# Patient Record
Sex: FEMALE | Race: WHITE | Employment: OTHER | URBAN - METROPOLITAN AREA
[De-identification: names, ages, dates, MRNs, and addresses within clinical notes are randomized per-mention and may not be internally consistent; named-entity substitution may affect disease eponyms.]

---

## 2022-03-21 NOTE — PROGRESS NOTES
New Patient, Referred by Dr. Luis Alfredo Wong for endometrial cancer, endometrial biopsy was done on 3/10/22, pt reports no vaginal bleeding today    1. Have you been to the ER, urgent care clinic since your last visit? Hospitalized since your last visit?  no    2. Have you seen or consulted any other health care providers outside of the [address] [place] [address] since your last visit? Include any pap smears or colon screening.    Yes, Dr. Luis Alfredo Wong

## 2022-03-22 ENCOUNTER — OFFICE VISIT (OUTPATIENT)
Dept: GYNECOLOGY | Age: 67
End: 2022-03-22
Payer: MEDICARE

## 2022-03-22 VITALS
HEART RATE: 92 BPM | DIASTOLIC BLOOD PRESSURE: 89 MMHG | BODY MASS INDEX: 38.09 KG/M2 | HEIGHT: 63 IN | SYSTOLIC BLOOD PRESSURE: 146 MMHG | WEIGHT: 215 LBS

## 2022-03-22 DIAGNOSIS — C54.1 CARCINOSARCOMA OF ENDOMETRIUM (HCC): Primary | ICD-10-CM

## 2022-03-22 PROBLEM — C55 MALIGNANT NEOPLASM OF UTERUS (HCC): Status: ACTIVE | Noted: 2022-03-10

## 2022-03-22 PROCEDURE — 3017F COLORECTAL CA SCREEN DOC REV: CPT | Performed by: OBSTETRICS & GYNECOLOGY

## 2022-03-22 PROCEDURE — G8432 DEP SCR NOT DOC, RNG: HCPCS | Performed by: OBSTETRICS & GYNECOLOGY

## 2022-03-22 PROCEDURE — G8400 PT W/DXA NO RESULTS DOC: HCPCS | Performed by: OBSTETRICS & GYNECOLOGY

## 2022-03-22 PROCEDURE — 1101F PT FALLS ASSESS-DOCD LE1/YR: CPT | Performed by: OBSTETRICS & GYNECOLOGY

## 2022-03-22 PROCEDURE — 99205 OFFICE O/P NEW HI 60 MIN: CPT | Performed by: OBSTETRICS & GYNECOLOGY

## 2022-03-22 PROCEDURE — 1090F PRES/ABSN URINE INCON ASSESS: CPT | Performed by: OBSTETRICS & GYNECOLOGY

## 2022-03-22 PROCEDURE — G0463 HOSPITAL OUTPT CLINIC VISIT: HCPCS | Performed by: OBSTETRICS & GYNECOLOGY

## 2022-03-22 PROCEDURE — G8427 DOCREV CUR MEDS BY ELIG CLIN: HCPCS | Performed by: OBSTETRICS & GYNECOLOGY

## 2022-03-22 PROCEDURE — G8419 CALC BMI OUT NRM PARAM NOF/U: HCPCS | Performed by: OBSTETRICS & GYNECOLOGY

## 2022-03-22 PROCEDURE — G8536 NO DOC ELDER MAL SCRN: HCPCS | Performed by: OBSTETRICS & GYNECOLOGY

## 2022-03-22 RX ORDER — CHOLECALCIFEROL (VITAMIN D3) 50 MCG
CAPSULE ORAL
COMMUNITY

## 2022-03-22 NOTE — PROGRESS NOTES
[address] [place] [address] Mathias Moritz [address], [address] [facility]  P [phone]  F [phone]    Office Note  Patient ID:  Name:  Yee Jonas  MRN:  [id number]  :  1955/66 y.o. Date:  3/22/2022      HISTORY OF PRESENT ILLNESS:  Ms. Yee Jonas is a 74 y.o.  postmenopausal female who presents as a new patient from Dr. Jose Antonio Renteria for carcinosarcoma of the endometrium. The patient reported vaginal spotting/bleeding in February (). She underwent a pelvic ultrasound and endometrial biopsy with Dr. Jose Antonio Renteria, which ultimately demonstrated carcinosarcoma. The patient denies pelvic or abdominal pain/bloating. Denies CP or SOB. Denies hematuria or hematochezia. She is without any other complaints. 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 Imaging Review:   Pelvic ultrasound 3/14/2022:  Impression: Multi-fibroid uterus. Possible fibroid versus polyp in uterine cavity. Ovaries are not visualized. ROS:  A comprehensive review of systems was negative except for that written in the History of Present Illness.  , 10 point ROS      ECOG ndGndrndanddndend:nd nd2nd Problem List:  Patient Active Problem List    Diagnosis Date Noted    Carcinosarcoma of endometrium ([address] 2022    PMB (postmenopausal bleeding)     Obesity     Malignant neoplasm of uterus ([address] 03/10/2022     PMH:  Past Medical History:   Diagnosis Date    Enlarged uterus     Malignant neoplasm of uterus ([address] 03/10/2022    endometrial biopsy done on 3/10/22 by Dr. Leanne Fields PMB (postmenopausal bleeding)       PSH:  Past Surgical History:   Procedure Laterality Date    HX ORTHOPAEDIC      knee replacement X2    HX OTHER SURGICAL  2016    repair of retina    HX TONSILLECTOMY      HX [facility] History:  Social History     Tobacco Use    Smoking status: Never Smoker    Smokeless tobacco: Never Used   Substance Use Topics    Alcohol use: Yes     Alcohol/week: 7.0 standard drinks     Types: 7 Glasses of wine per week      Family History:  Family History   Problem Relation Age of Onset    Cancer Father         malignant tumor of urinary bladder      Medications: (reviewed)  Current Outpatient Medications   Medication Sig    cholecalciferol, vitamin D3, (VITAMIN D3 PO) Take  by mouth.  ubidecarenone (COQ-10 PO) Take  by mouth.  KRILL OIL PO Take  by mouth.  MULTIVITAMIN PO Take  by mouth.  B.animalis,bifid,infantis,long (Probiotic 4X) 10-15 mg TbEC Take  by mouth. No current facility-administered medications for this visit. Allergies: (reviewed)  Allergies   Allergen Reactions    Pcn [Penicillins] Unable to Obtain       OBJECTIVE:    Physical Exam:  VITAL SIGNS: Vitals:    03/22/22 1408   BP: (!) 146/89   Pulse: 92   Weight: 215 lb (97.5 kg)   Height: 5' 3\" (1.6 m)     Body mass index is 38.09 kg/m². GENERAL MARLENE: Conversant, alert, oriented. No acute distress. HEENT: HEENT. No thyroid enlargement. No JVD. Neck: Supple without restrictions. RESPIRATORY: Clear to auscultation and percussion to the bases. No CVAT. CARDIOVASC: RRR without murmur/rub. GASTROINT: soft, non-tender, without masses or organomegaly   MUSCULOSKEL: no joint tenderness, deformity or swelling       EXTREMITIES: extremities normal, atraumatic, no cyanosis or edema   PELVIC: Exam chaperoned by nurse. Normal appearing external genitalia. On speculum exam, normal appearing vagina and cervix. On bimanual exam, the cervix and uterus are mobile; Uterus is 10 weeks size. No evidence of adnexal masses or nodularity. RECTAL: deferred   DOLORES SURVEY: No suspicious lymphadenopathy or edema noted. NEURO: Grossly intact. No acute deficit.        Lab Date as available:    No results found for: WBC, HGB, HCT, PLT, MCV, HGBEXT, HCTEXT, PLTEXT, HGBEXT, HCTEXT, PLTEXT  No results found for: NA, K, CL, CO2, AGAP, GLU, BUN, CREA, BUCR, GFRAA, GFRNA, CA      IMPRESSION/PLAN:    Ms. Yecenia Pierce is a 74 y.o. 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 Problems:     Patient Active Problem List    Diagnosis Date Noted    Carcinosarcoma of endometrium ([facility] 03/22/2022    PMB (postmenopausal bleeding)     Obesity     Malignant neoplasm of uterus ([facility] 03/10/2022       I reviewed Ms. Soco Teixeira course to date, including her medical records, recent studies, physical exam, and review of symptoms. Counseled patient regarding standard of care recommendations for endometrial cancer including surgical staging. Given high-grade carcinosarcoma, will obtain CT C/A/P preoperatively. Orders placed today. Will plan for virtual visit to discuss results prior to surgery. Plan for robotic-assisted TLH/BSO/SLND, possible pelvic and/or para-aortic LND, possible exploratory laparotomy. Will post for surgery on 4/7/2022. Plan for CBCD, CMP, HbA1c, EKG, and CXR prior to surgery. Counseled patient regarding risks, benefits, indications, and alternatives to surgery. Plan to sign consents day of surgery. All questions and concerns were addressed with the patient and she is comfortable with the plan.     Defined Sensitive Document    >50% of total time allocated to visit dedicated to counseling, 60 minutes total.    Signed By: Juliette Felipe MD     3/22/2022/2:07 PM

## 2022-03-22 NOTE — LETTER
3/22/2022    Patient: Kenya Ibrahim   YOB: 1955   Date of Visit: 3/22/2022     Lenka Parnell MD  [address]  [address] [address]  Via Fax: [phone]    Dear Lenka Parnell MD,      Thank you for referring Ms. Dayanara Morse Christelnctaliaterra to [facility] for evaluation. My notes for this consultation are attached. If you have questions, please do not hesitate to call me. I look forward to following your patient along with you.       Sincerely,    Gurwinder Selby MD

## 2022-03-23 ENCOUNTER — HOSPITAL ENCOUNTER (OUTPATIENT)
Dept: CT IMAGING | Age: 67
Discharge: HOME | End: 2022-03-23
Attending: OBSTETRICS & GYNECOLOGY
Payer: MEDICARE

## 2022-03-23 DIAGNOSIS — C54.1 CARCINOSARCOMA OF ENDOMETRIUM (HCC): ICD-10-CM

## 2022-03-23 LAB — CREAT BLD-MCNC: 0.6 MG/DL (ref 0.6–1.3)

## 2022-03-23 PROCEDURE — 82565 ASSAY OF CREATININE: CPT

## 2022-03-23 PROCEDURE — 74011000250 HC RX REV CODE- 250: Performed by: OBSTETRICS & GYNECOLOGY

## 2022-03-23 PROCEDURE — 71260 CT THORAX DX C+: CPT

## 2022-03-23 PROCEDURE — 74011000636 HC RX REV CODE- 636: Performed by: OBSTETRICS & GYNECOLOGY

## 2022-03-23 PROCEDURE — 74177 CT ABD & PELVIS W/CONTRAST: CPT

## 2022-03-23 RX ORDER — BARIUM SULFATE 20 MG/ML
900 SUSPENSION ORAL
Status: COMPLETED | OUTPATIENT
Start: 2022-03-23 | End: 2022-03-23

## 2022-03-23 RX ADMIN — BARIUM SULFATE 900 ML: 21 SUSPENSION ORAL at 11:10

## 2022-03-23 RX ADMIN — IOPAMIDOL 100 ML: 755 INJECTION, SOLUTION INTRAVENOUS at 11:10

## 2022-03-24 PROBLEM — C55 MALIGNANT NEOPLASM OF UTERUS (HCC): Status: ACTIVE | Noted: 2022-03-10

## 2022-03-24 PROBLEM — C54.1 CARCINOSARCOMA OF ENDOMETRIUM (HCC): Status: ACTIVE | Noted: 2022-03-22

## 2022-03-28 NOTE — PROGRESS NOTES
Virtual visit to discuss CT scan results from 3/23/2022    Vitals:    03/29/22 1433 03/29/22 1450   BP: (!) 170/100 124/86   BP 1 Location: Left arm Left arm   BP Patient Position: Sitting Sitting   Pulse: 91 87   Height: 5' 3\" (1.6 m)    Weight: 213 lb 3.2 oz (96.7 kg)          1. Have you been to the ER, urgent care clinic since your last visit? Hospitalized since your last visit?  no    2. Have you seen or consulted any other health care providers outside of the [address] [place] [address] since your last visit? Include any pap smears or colon screening.    no

## 2022-03-29 ENCOUNTER — OFFICE VISIT (OUTPATIENT)
Dept: GYNECOLOGY | Age: 67
End: 2022-03-29
Payer: MEDICARE

## 2022-03-29 VITALS
SYSTOLIC BLOOD PRESSURE: 124 MMHG | WEIGHT: 213.2 LBS | HEIGHT: 63 IN | DIASTOLIC BLOOD PRESSURE: 86 MMHG | HEART RATE: 87 BPM | BODY MASS INDEX: 37.78 KG/M2

## 2022-03-29 DIAGNOSIS — C54.1 CARCINOSARCOMA OF ENDOMETRIUM (HCC): Primary | ICD-10-CM

## 2022-03-29 PROCEDURE — 3017F COLORECTAL CA SCREEN DOC REV: CPT | Performed by: OBSTETRICS & GYNECOLOGY

## 2022-03-29 PROCEDURE — 1101F PT FALLS ASSESS-DOCD LE1/YR: CPT | Performed by: OBSTETRICS & GYNECOLOGY

## 2022-03-29 PROCEDURE — G8417 CALC BMI ABV UP PARAM F/U: HCPCS | Performed by: OBSTETRICS & GYNECOLOGY

## 2022-03-29 PROCEDURE — G8427 DOCREV CUR MEDS BY ELIG CLIN: HCPCS | Performed by: OBSTETRICS & GYNECOLOGY

## 2022-03-29 PROCEDURE — G8400 PT W/DXA NO RESULTS DOC: HCPCS | Performed by: OBSTETRICS & GYNECOLOGY

## 2022-03-29 PROCEDURE — G8432 DEP SCR NOT DOC, RNG: HCPCS | Performed by: OBSTETRICS & GYNECOLOGY

## 2022-03-29 PROCEDURE — G8536 NO DOC ELDER MAL SCRN: HCPCS | Performed by: OBSTETRICS & GYNECOLOGY

## 2022-03-29 PROCEDURE — 99214 OFFICE O/P EST MOD 30 MIN: CPT | Performed by: OBSTETRICS & GYNECOLOGY

## 2022-03-29 PROCEDURE — 1090F PRES/ABSN URINE INCON ASSESS: CPT | Performed by: OBSTETRICS & GYNECOLOGY

## 2022-03-29 PROCEDURE — G0463 HOSPITAL OUTPT CLINIC VISIT: HCPCS | Performed by: OBSTETRICS & GYNECOLOGY

## 2022-03-29 NOTE — PROGRESS NOTES
[address] [place] [address] [address]ias [address], [address] [facility]  P [phone]  F [phone]    Office Note  Patient ID:  Name:  Jose Padilla  MRN:  [id number]  :  1955/66 y.o. Date:  3/30/2022      HISTORY OF PRESENT ILLNESS:  Ms. Jose Padilla is a 74 y.o.  postmenopausal female who presents as a new patient from Dr. Hollis Guzman for carcinosarcoma of the endometrium. The patient reported vaginal spotting/bleeding in February (). She underwent a pelvic ultrasound and endometrial biopsy with Dr. Hollis Guzman, which ultimately demonstrated carcinosarcoma. The patient denies pelvic or abdominal pain/bloating. Denies CP or SOB. Denies hematuria or hematochezia. She is without any other complaints. Interval History:  Presents today for imaging review. 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 Imaging Review:   CT C/A/P 3/23/2022:  FINDINGS:   THYROID: No nodule. MEDIASTINUM: No mass or lymphadenopathy. KARL: No mass or lymphadenopathy. THORACIC AORTA: No dissection or aneurysm. MAIN PULMONARY ARTERY: Normal in caliber. TRACHEA/BRONCHI: Patent. ESOPHAGUS: No wall thickening or dilatation. HEART: Normal in size. PLEURA: No effusion or pneumothorax. LUNGS: No nodule, mass, or airspace disease. Incidentally noted is a 2 mm  subpleural nodule right upper lobe image 46 series 4  LIVER: No mass or biliary dilatation. There is hepatic steatosis  GALLBLADDER: There is a gallstone  SPLEEN: No mass. PANCREAS: No mass or ductal dilatation. ADRENALS: There is a 1 cm left adrenal nodule  KIDNEYS: No mass, calculus, or hydronephrosis. STOMACH: There is thickening of the body of the stomach most likely related to  incomplete distention  SMALL BOWEL: No dilatation or wall thickening.   COLON: No dilatation or wall thickening. APPENDIX: Unremarkable. PERITONEUM: No ascites or pneumoperitoneum. RETROPERITONEUM: No lymphadenopathy or aortic aneurysm. REPRODUCTIVE ORGANS: The endometrium is thickened measuring up to 5.3 cm. There  is suspicion of a 3.2 cm enhancing lesion in the anterior uterine myometrium and  a 2.8 cm enhancing lesion right uterine myometrium  URINARY BLADDER: Incompletely distended  BONES: No destructive bone lesion. There are small low densities at T1/T2  bilaterally consistent with small lateral meningocele. There is slight scoliosis  levoconvex lumbar spine with 3 mm of anterolisthesis of L4 on L5 and  degenerative changes mid to lower lumbar spine  ADDITIONAL COMMENTS: N/A  IMPRESSION  1. CT of the chest demonstrates no definite evidence of metastatic disease. 2. CT of the abdomen and pelvis demonstrates the endometrium to be thickened  measuring 5.3 cm. There is suspicion of 2 enhancing lesions in the uterine  myometrium. No adenopathy or mass is identified to suggest metastatic disease. There is a 1 cm nodule in the left adrenal gland which may represent a small  adenoma    Pelvic ultrasound 3/14/2022:  Impression: Multi-fibroid uterus. Possible fibroid versus polyp in uterine cavity. Ovaries are not visualized. ROS:  A comprehensive review of systems was negative except for that written in the History of Present Illness.  , 10 point ROS      ECOG ndGndrndanddndend:nd nd2nd Problem List:  Patient Active Problem List    Diagnosis Date Noted    Carcinosarcoma of endometrium ([address] 03/22/2022    PMB (postmenopausal bleeding)     Obesity     Malignant neoplasm of uterus ([address] 03/10/2022     PMH:  Past Medical History:   Diagnosis Date    Enlarged uterus     Malignant neoplasm of uterus ([address] 03/10/2022    endometrial biopsy done on 3/10/22 by Dr. Nicole Chauhan PMB (postmenopausal bleeding)       PSH:  Past Surgical History:   Procedure Laterality Date    HX ORTHOPAEDIC      knee replacement X2    HX OTHER SURGICAL  2016    repair of retina    HX TONSILLECTOMY      HX TUBAL LIGATION  1981      Social History:  Social History     Tobacco Use    Smoking status: Never Smoker    Smokeless tobacco: Never Used   Substance Use Topics    Alcohol use: Yes     Alcohol/week: 7.0 standard drinks     Types: 7 Glasses of wine per week      Family History:  Family History   Problem Relation Age of Onset    Cancer Father         malignant tumor of urinary bladder      Medications: (reviewed)  Current Outpatient Medications   Medication Sig    cholecalciferol, vitamin D3, (VITAMIN D3 PO) Take  by mouth.  ubidecarenone (COQ-10 PO) Take  by mouth.  KRILL OIL PO Take  by mouth.  MULTIVITAMIN PO Take  by mouth.  B.animalis,bifid,infantis,long (Probiotic 4X) 10-15 mg TbEC Take  by mouth. No current facility-administered medications for this visit. Allergies: (reviewed)  Allergies   Allergen Reactions    Pcn [Penicillins] Unable to Obtain       OBJECTIVE:    Physical Exam:  VITAL SIGNS: Vitals:    03/29/22 1433 03/29/22 1450   BP: (!) 170/100 124/86   Pulse: 91 87   Weight: 213 lb 3.2 oz (96.7 kg)    Height: 5' 3\" (1.6 m)      Body mass index is 37.77 kg/m². GENERAL MARLENE: Conversant, alert, oriented. No acute distress. HEENT: HEENT. No thyroid enlargement. No JVD. Neck: Supple without restrictions. RESPIRATORY: Clear to auscultation and percussion to the bases. No CVAT. CARDIOVASC: RRR without murmur/rub. GASTROINT: soft, non-tender, without masses or organomegaly   MUSCULOSKEL: no joint tenderness, deformity or swelling       EXTREMITIES: extremities normal, atraumatic, no cyanosis or edema   PELVIC: Exam chaperoned by nurse. Normal appearing external genitalia. On speculum exam, normal appearing vagina and cervix. On bimanual exam, the cervix and uterus are mobile; Uterus is 10 weeks size. No evidence of adnexal masses or nodularity.     RECTAL: deferred   DOLORES SURVEY: No suspicious lymphadenopathy or edema noted. NEURO: Grossly intact. No acute deficit. Lab Date as available:    No results found for: WBC, HGB, HCT, PLT, MCV, HGBEXT, HCTEXT, PLTEXT, HGBEXT, HCTEXT, PLTEXT  No results found for: NA, K, CL, CO2, AGAP, GLU, BUN, CREA, BUCR, GFRAA, GFRNA, CA      IMPRESSION/PLAN:    Ms. Veto Lima is a 74 y.o. 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 Problems:     Patient Active Problem List    Diagnosis Date Noted    Carcinosarcoma of endometrium ([facility] 03/22/2022    PMB (postmenopausal bleeding)     Obesity     Malignant neoplasm of uterus ([facility] 03/10/2022     Reviewed patient's course to date, including her recent CT C/A/P. CT C/A/P 3/23/2022 negative for metastatic disease. Plan to proceed with surgery as discussed. Plan for robotic-assisted TLH/BSO/SLND, possible pelvic and/or para-aortic LND, possible exploratory laparotomy. Posted for surgery on 4/7/2022. Will review preop labs and EKG. Counseled patient regarding risks, benefits, indications, and alternatives to surgery. Plan to sign consents day of surgery. All questions and concerns were addressed with the patient and she is comfortable with the plan. An electronic signature was used to authenticate this note.      Ganesh Marquez MD

## 2022-03-29 NOTE — H&P (VIEW-ONLY)
[address] [place] [address] [address]ias Moritz [address] [facility]  P [phone]  F [phone]    Office Note  Patient ID:  Name:  Kimberly Howell  MRN:  [id number]  :  1955/66 y.o. Date:  3/30/2022      HISTORY OF PRESENT ILLNESS:  Ms. Kimberly Howell is a 74 y.o.  postmenopausal female who presents as a new patient from Dr. Shani Vegas for carcinosarcoma of the endometrium. The patient reported vaginal spotting/bleeding in February (). She underwent a pelvic ultrasound and endometrial biopsy with Dr. Shani Vegas, which ultimately demonstrated carcinosarcoma. The patient denies pelvic or abdominal pain/bloating. Denies CP or SOB. Denies hematuria or hematochezia. She is without any other complaints. Interval History:  Presents today for imaging review. 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 Imaging Review:   CT C/A/P 3/23/2022:  FINDINGS:   THYROID: No nodule. MEDIASTINUM: No mass or lymphadenopathy. KARL: No mass or lymphadenopathy. THORACIC AORTA: No dissection or aneurysm. MAIN PULMONARY ARTERY: Normal in caliber. TRACHEA/BRONCHI: Patent. ESOPHAGUS: No wall thickening or dilatation. HEART: Normal in size. PLEURA: No effusion or pneumothorax. LUNGS: No nodule, mass, or airspace disease. Incidentally noted is a 2 mm  subpleural nodule right upper lobe image 46 series 4  LIVER: No mass or biliary dilatation. There is hepatic steatosis  GALLBLADDER: There is a gallstone  SPLEEN: No mass. PANCREAS: No mass or ductal dilatation. ADRENALS: There is a 1 cm left adrenal nodule  KIDNEYS: No mass, calculus, or hydronephrosis. STOMACH: There is thickening of the body of the stomach most likely related to  incomplete distention  SMALL BOWEL: No dilatation or wall thickening.   COLON: No dilatation or wall thickening. APPENDIX: Unremarkable. PERITONEUM: No ascites or pneumoperitoneum. RETROPERITONEUM: No lymphadenopathy or aortic aneurysm. REPRODUCTIVE ORGANS: The endometrium is thickened measuring up to 5.3 cm. There  is suspicion of a 3.2 cm enhancing lesion in the anterior uterine myometrium and  a 2.8 cm enhancing lesion right uterine myometrium  URINARY BLADDER: Incompletely distended  BONES: No destructive bone lesion. There are small low densities at T1/T2  bilaterally consistent with small lateral meningocele. There is slight scoliosis  levoconvex lumbar spine with 3 mm of anterolisthesis of L4 on L5 and  degenerative changes mid to lower lumbar spine  ADDITIONAL COMMENTS: N/A  IMPRESSION  1. CT of the chest demonstrates no definite evidence of metastatic disease. 2. CT of the abdomen and pelvis demonstrates the endometrium to be thickened  measuring 5.3 cm. There is suspicion of 2 enhancing lesions in the uterine  myometrium. No adenopathy or mass is identified to suggest metastatic disease. There is a 1 cm nodule in the left adrenal gland which may represent a small  adenoma    Pelvic ultrasound 3/14/2022:  Impression: Multi-fibroid uterus. Possible fibroid versus polyp in uterine cavity. Ovaries are not visualized. ROS:  A comprehensive review of systems was negative except for that written in the History of Present Illness.  , 10 point ROS      ECOG ndGndrndanddndend:nd nd2nd Problem List:  Patient Active Problem List    Diagnosis Date Noted    Carcinosarcoma of endometrium ([address] 03/22/2022    PMB (postmenopausal bleeding)     Obesity     Malignant neoplasm of uterus ([address] 03/10/2022     PMH:  Past Medical History:   Diagnosis Date    Enlarged uterus     Malignant neoplasm of uterus ([address] 03/10/2022    endometrial biopsy done on 3/10/22 by Dr. Arlinda Nissen PMB (postmenopausal bleeding)       PSH:  Past Surgical History:   Procedure Laterality Date    HX ORTHOPAEDIC      knee replacement X2    HX OTHER SURGICAL  2016    repair of retina    HX TONSILLECTOMY      HX TUBAL LIGATION  1981      Social History:  Social History     Tobacco Use    Smoking status: Never Smoker    Smokeless tobacco: Never Used   Substance Use Topics    Alcohol use: Yes     Alcohol/week: 7.0 standard drinks     Types: 7 Glasses of wine per week      Family History:  Family History   Problem Relation Age of Onset    Cancer Father         malignant tumor of urinary bladder      Medications: (reviewed)  Current Outpatient Medications   Medication Sig    cholecalciferol, vitamin D3, (VITAMIN D3 PO) Take  by mouth.  ubidecarenone (COQ-10 PO) Take  by mouth.  KRILL OIL PO Take  by mouth.  MULTIVITAMIN PO Take  by mouth.  B.animalis,bifid,infantis,long (Probiotic 4X) 10-15 mg TbEC Take  by mouth. No current facility-administered medications for this visit. Allergies: (reviewed)  Allergies   Allergen Reactions    Pcn [Penicillins] Unable to Obtain       OBJECTIVE:    Physical Exam:  VITAL SIGNS: Vitals:    03/29/22 1433 03/29/22 1450   BP: (!) 170/100 124/86   Pulse: 91 87   Weight: 213 lb 3.2 oz (96.7 kg)    Height: 5' 3\" (1.6 m)      Body mass index is 37.77 kg/m². GENERAL MARLENE: Conversant, alert, oriented. No acute distress. HEENT: HEENT. No thyroid enlargement. No JVD. Neck: Supple without restrictions. RESPIRATORY: Clear to auscultation and percussion to the bases. No CVAT. CARDIOVASC: RRR without murmur/rub. GASTROINT: soft, non-tender, without masses or organomegaly   MUSCULOSKEL: no joint tenderness, deformity or swelling       EXTREMITIES: extremities normal, atraumatic, no cyanosis or edema   PELVIC: Exam chaperoned by nurse. Normal appearing external genitalia. On speculum exam, normal appearing vagina and cervix. On bimanual exam, the cervix and uterus are mobile; Uterus is 10 weeks size. No evidence of adnexal masses or nodularity.     RECTAL: deferred   DOLORES SURVEY: No suspicious lymphadenopathy or edema noted. NEURO: Grossly intact. No acute deficit. Lab Date as available:    No results found for: WBC, HGB, HCT, PLT, MCV, HGBEXT, HCTEXT, PLTEXT, HGBEXT, HCTEXT, PLTEXT  No results found for: NA, K, CL, CO2, AGAP, GLU, BUN, CREA, BUCR, GFRAA, GFRNA, CA      IMPRESSION/PLAN:    Ms. Munira Norris is a 74 y.o. 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 Problems:     Patient Active Problem List    Diagnosis Date Noted    Carcinosarcoma of endometrium ([facility] 03/22/2022    PMB (postmenopausal bleeding)     Obesity     Malignant neoplasm of uterus ([facility] 03/10/2022     Reviewed patient's course to date, including her recent CT C/A/P. CT C/A/P 3/23/2022 negative for metastatic disease. Plan to proceed with surgery as discussed. Plan for robotic-assisted TLH/BSO/SLND, possible pelvic and/or para-aortic LND, possible exploratory laparotomy. Posted for surgery on 4/7/2022. Will review preop labs and EKG. Counseled patient regarding risks, benefits, indications, and alternatives to surgery. Plan to sign consents day of surgery. All questions and concerns were addressed with the patient and she is comfortable with the plan. An electronic signature was used to authenticate this note.      Bebe Sher MD

## 2022-03-31 ENCOUNTER — HOSPITAL ENCOUNTER (OUTPATIENT)
Dept: PREADMISSION TESTING | Age: 67
Discharge: HOME | End: 2022-03-31
Payer: MEDICARE

## 2022-03-31 VITALS
SYSTOLIC BLOOD PRESSURE: 149 MMHG | HEIGHT: 63 IN | HEART RATE: 75 BPM | TEMPERATURE: 97.5 F | BODY MASS INDEX: 37.21 KG/M2 | DIASTOLIC BLOOD PRESSURE: 72 MMHG | WEIGHT: 210 LBS

## 2022-03-31 LAB
ALBUMIN SERPL-MCNC: 4.2 G/DL (ref 3.5–5)
ALBUMIN/GLOB SERPL: 1.3 {RATIO} (ref 1.1–2.2)
ALP SERPL-CCNC: 59 U/L (ref 45–117)
ALT SERPL-CCNC: 32 U/L (ref 12–78)
ANION GAP SERPL CALC-SCNC: 8 MMOL/L (ref 5–15)
AST SERPL-CCNC: 14 U/L (ref 15–37)
ATRIAL RATE: 66 BPM
BILIRUB SERPL-MCNC: 0.3 MG/DL (ref 0.2–1)
BUN SERPL-MCNC: 16 MG/DL (ref 6–20)
BUN/CREAT SERPL: 21 (ref 12–20)
CALCIUM SERPL-MCNC: 9.8 MG/DL (ref 8.5–10.1)
CALCULATED P AXIS, ECG09: 53 DEGREES
CALCULATED R AXIS, ECG10: 9 DEGREES
CALCULATED T AXIS, ECG11: 21 DEGREES
CHLORIDE SERPL-SCNC: 107 MMOL/L (ref 97–108)
CO2 SERPL-SCNC: 25 MMOL/L (ref 21–32)
CREAT SERPL-MCNC: 0.75 MG/DL (ref 0.55–1.02)
DIAGNOSIS, 93000: NORMAL
EST. AVERAGE GLUCOSE BLD GHB EST-MCNC: 114 MG/DL
GLOBULIN SER CALC-MCNC: 3.3 G/DL (ref 2–4)
GLUCOSE SERPL-MCNC: 95 MG/DL (ref 65–100)
HBA1C MFR BLD: 5.6 % (ref 4–5.6)
P-R INTERVAL, ECG05: 132 MS
POTASSIUM SERPL-SCNC: 4 MMOL/L (ref 3.5–5.1)
PROT SERPL-MCNC: 7.5 G/DL (ref 6.4–8.2)
Q-T INTERVAL, ECG07: 406 MS
QRS DURATION, ECG06: 76 MS
QTC CALCULATION (BEZET), ECG08: 425 MS
SODIUM SERPL-SCNC: 140 MMOL/L (ref 136–145)
VENTRICULAR RATE, ECG03: 66 BPM

## 2022-03-31 PROCEDURE — 83036 HEMOGLOBIN GLYCOSYLATED A1C: CPT

## 2022-03-31 PROCEDURE — 93005 ELECTROCARDIOGRAM TRACING: CPT

## 2022-03-31 PROCEDURE — 80053 COMPREHEN METABOLIC PANEL: CPT

## 2022-03-31 PROCEDURE — 36415 COLL VENOUS BLD VENIPUNCTURE: CPT

## 2022-03-31 NOTE — PERIOP NOTES
[address] INSTRUCTIONS    Surgery Date:   4/7/22    [facility] staff will call you between 4 PM- 8 PM the day before surgery with your arrival time. If your surgery is on a Monday, we will call you the preceding Friday. Please call [phone] after 8 PM if you did not receive your arrival time. 1. Please report to [facility] Patient Access/Admitting on the 1st floor. Bring your insurance card, photo identification, and any copayment ( if applicable). 2. If you are going home the same day of your surgery, you must have a responsible adult to drive you home. You need to have a responsible adult to stay with you the first 24 hours after surgery and you should not drive a car for 24 hours following your surgery. 3. Nothing to eat or drink after midnight the night before surgery. This includes no water, gum, mints, coffee, juice, etc.  Please note special instructions, if applicable, below for medications. 4. Do NOT drink alcohol or smoke 24 hours before surgery. STOP smoking for 14 days prior as it helps with breathing and healing after surgery. 5. If you are being admitted to the hospital, please leave personal belongings/luggage in your car until you have an assigned hospital room number. 6. Please wear comfortable clothes. Wear your glasses instead of contacts. We ask that all money, jewelry and valuables be left at home. Wear no make up, particularly mascara, the day of surgery. 7.  All body piercings, rings, and jewelry need to be removed and left at home. Please remove any nail polish or artificial nails from your fingernails. Please wear your hair loose or down. Please no pony-tails, buns, or any metal hair accessories. If you shower the morning of surgery, please do not apply any lotions or powders afterwards. You may wear deodorant, unless having breast surgery. Do not shave any body area within 24 hours of your surgery.   8. Please follow all instructions to avoid any potential surgical cancellation. 9. Should your physical condition change, (i.e. fever, cold, flu, etc.) please notify your surgeon as soon as possible. 10. It is important to be on time. If a situation occurs where you may be delayed, please call:  [phone] / [phone] on the day of surgery. 11. The Preadmission Testing staff can be reached at [phone]. 12. Special instructions: NONE      Current Outpatient Medications   Medication Sig    vit C/Zn gluc/herbal no. 325 (ELDERBERRY ZINC VIT C MM) by Mucous Membrane route daily.  cholecalciferol, vitamin D3, (VITAMIN D3 PO) Take  by mouth.  ubidecarenone (COQ-10 PO) Take  by mouth.  KRILL OIL PO Take  by mouth.  MULTIVITAMIN PO Take  by mouth.  B.animalis,bifid,infantis,long (Probiotic 4X) 10-15 mg TbEC Take  by mouth. No current facility-administered medications for this encounter. 1. YOU MUST ONLY TAKE THESE MEDICATIONS THE MORNING OF SURGERY WITH A SIP OF WATER: NONE  2. MEDICATIONS TO TAKE THE MORNING OF SURGERY ONLY IF NEEDED: NONE  3. HOLD these prescription medications BEFORE Surgery: STOP ALL VITAMINS AND SUPPLEMENTS  4. Ask your surgeon/prescribing physician about when/if to STOP taking these medications: NONE  5. Stop all vitamins, herbal medicines and Aspirin containing products 7 days prior to surgery. Stop any non-steroidal anti-inflammatory drugs (i.e. Ibuprofen, Naproxen, Advil, Aleve) 3 days before surgery. You may take Tylenol. 6. If you are currently taking Plavix, Coumadin, or any other blood-thinning/anticoagulant medication contact your prescribing physician for instructions. Preventing Infections Before and After  Your Surgery    IMPORTANT INSTRUCTIONS      You play an important role in your health and preparation for surgery. To reduce the germs on your skin you will need to shower with CHG soap (Chorhexidine gluconate 4%) two times before surgery.     CHG soap (Hibiclens, Hex-A-Clens or store brand)   CHG soap will be provided at your Preadmission Testing (PAT) appointment.  If you do not have a PAT appointment before surgery, you may arrange to  CHG soap from our office or purchase CHG soap at a pharmacy, grocery or department store.  You need to purchase TWO 4 ounce bottles to use for your 2 showers. Steps to follow:  1. Wash your hair with your normal shampoo and your body with regular soap and rinse well to remove shampoo and soap from your skin. 2. Wet a clean washcloth and turn off the shower. 3. Put CHG soap on washcloth and apply to your entire body from the neck down. Do not use on your head, face or private parts(genitals). Do not use CHG soap on open sores, wounds or areas of skin irritation. 4. Wash you body gently for 5 minutes. Do not wash your skin too hard. This soap does not create lather. Pay special attention to your underarms and from your belly button to your feet. 5. Turn the shower back on and rinse well to get CHG soap off your body. 6. Pat your skin dry with a clean, dry towel. Do not apply lotions or moisturizer. 7. Put on clean clothes and sleep on fresh bed sheets and do not allow pets to sleep with you. Shower with CHG soap 2 times before your surgery   The evening before your surgery   The morning of your surgery      Tips to help prevent infections after your surgery:  1. Protect your surgical wound from germs:  ? Hand washing is the most important thing you and your caregivers can do to prevent infections. ? Keep your bandage clean and dry! ? Do not touch your surgical wound. 2. Use clean, freshly washed towels and washcloths every time you shower; do not share bath linens with others. 3. Until your surgical wound is healed, wear clothing and sleep on bed linens each day that are clean and freshly washed. 4. Do not allow pets to sleep in your bed with you or touch your surgical wound. 5. Do not smoke  smoking delays wound healing.  This may be a good time to stop smoking. 6. If you have diabetes, it is important for you to manage your blood sugar levels properly before your surgery as well as after your surgery. Poorly managed blood sugar levels slow down wound healing and prevent you from healing completely. Patient Information Regarding COVID Restrictions    Day of Procedure     Please park in the parking deck or any designated visitor parking lot.  Enter the facility through the [facility] of the hospital.   On the day of surgery, please provide the cell phone number of the person who will be waiting for you to the Patient Access representative at the time of registration.  Please wear a mask on the day of your procedure.  We are now allowing two designated visitors per stay. Pediatric patients may have 2 designated visitors. These two people may come in with you on the day of your procedure.  No visitors under the age of 13.  The designated visitor must also wear a mask.  Once your procedure and the immediate recovery period is completed, a nurse in the recovery area will contact your designated visitor to inform them of your room number or to otherwise review other pertinent information regarding your care.  Social distancing practices are to be adhered to in waiting areas and the cafeteria. The patient was contacted  in person. She verbalized understanding of all instructions does not  need reinforcement.

## 2022-04-05 ENCOUNTER — NURSE NAVIGATOR (OUTPATIENT)
Dept: CASE MANAGEMENT | Age: 67
End: 2022-04-05

## 2022-04-05 NOTE — NURSE NAVIGATOR
[employer]  Gyn/Onc and Brain Navigator Encounter    Name:    Abiel Quintero  Age:    74 y.o. Encounter type:  []Patient Initiated  [x]Navigator Follow-up [x]Pre-op  []Post-op  []Check-in Prior to First Treatment []Treatment Modality Change  []1st point of Contact []Referral []Other:       Narrative:   LVM and introduced self and role. Following up with their scheduled surgery to see if they had any questions about that and if they felt comfortable with all the information given. I emailed the patient the same information. I let them know I am here to support them and to help in any way possible and I will follow them for the duration of their care. Thank you for allowing me to be a part of Daina's care. Kaur PIERREN, RN.   Gyn Oncology/ Primary Brain Tumor [address] [place] [address] 22.  Office: [phone]  Cell: [phone]  F: [phone]  [email or web address]  Good Help to Those in [facility]

## 2022-04-06 ENCOUNTER — ANESTHESIA EVENT (OUTPATIENT)
Dept: SURGERY | Age: 67
End: 2022-04-06
Payer: MEDICARE

## 2022-04-07 ENCOUNTER — ANESTHESIA (OUTPATIENT)
Dept: SURGERY | Age: 67
End: 2022-04-07
Payer: MEDICARE

## 2022-04-07 ENCOUNTER — HOSPITAL ENCOUNTER (OUTPATIENT)
Age: 67
Discharge: HOME | End: 2022-04-08
Attending: OBSTETRICS & GYNECOLOGY | Admitting: OBSTETRICS & GYNECOLOGY
Payer: MEDICARE

## 2022-04-07 DIAGNOSIS — G89.18 POSTOPERATIVE PAIN: Primary | ICD-10-CM

## 2022-04-07 PROBLEM — C54.1 ENDOMETRIAL CANCER (HCC): Status: ACTIVE | Noted: 2022-04-07

## 2022-04-07 PROCEDURE — 2709999900 HC NON-CHARGEABLE SUPPLY: Performed by: OBSTETRICS & GYNECOLOGY

## 2022-04-07 PROCEDURE — 74011250636 HC RX REV CODE- 250/636: Performed by: OBSTETRICS & GYNECOLOGY

## 2022-04-07 PROCEDURE — 77030039895 HC SYST SMK EVAC LAP COVD -B: Performed by: OBSTETRICS & GYNECOLOGY

## 2022-04-07 PROCEDURE — 77030002933 HC SUT MCRYL J&J -A: Performed by: OBSTETRICS & GYNECOLOGY

## 2022-04-07 PROCEDURE — 74011000250 HC RX REV CODE- 250: Performed by: PHYSICIAN ASSISTANT

## 2022-04-07 PROCEDURE — 88309 TISSUE EXAM BY PATHOLOGIST: CPT

## 2022-04-07 PROCEDURE — 74011000250 HC RX REV CODE- 250: Performed by: OBSTETRICS & GYNECOLOGY

## 2022-04-07 PROCEDURE — 74011250637 HC RX REV CODE- 250/637: Performed by: ANESTHESIOLOGY

## 2022-04-07 PROCEDURE — 77030003666 HC NDL SPINAL BD -A: Performed by: OBSTETRICS & GYNECOLOGY

## 2022-04-07 PROCEDURE — 76210000016 HC OR PH I REC 1 TO 1.5 HR: Performed by: OBSTETRICS & GYNECOLOGY

## 2022-04-07 PROCEDURE — 88341 IMHCHEM/IMCYTCHM EA ADD ANTB: CPT

## 2022-04-07 PROCEDURE — 77030018778 HC MANIP UTER VCAR CNMD -B: Performed by: OBSTETRICS & GYNECOLOGY

## 2022-04-07 PROCEDURE — 77030033162 HC PCH SPEC RTVR ENDOSC AMR -B: Performed by: OBSTETRICS & GYNECOLOGY

## 2022-04-07 PROCEDURE — 74011250636 HC RX REV CODE- 250/636: Performed by: ANESTHESIOLOGY

## 2022-04-07 PROCEDURE — 74011250637 HC RX REV CODE- 250/637: Performed by: PHYSICIAN ASSISTANT

## 2022-04-07 PROCEDURE — 77030031492 HC PRT ACC BLNT AIRSEAL CNMD -B: Performed by: OBSTETRICS & GYNECOLOGY

## 2022-04-07 PROCEDURE — 77030020703 HC SEAL CANN DISP INTU -B: Performed by: OBSTETRICS & GYNECOLOGY

## 2022-04-07 PROCEDURE — 74011000250 HC RX REV CODE- 250: Performed by: ANESTHESIOLOGY

## 2022-04-07 PROCEDURE — 76060000036 HC ANESTHESIA 2.5 TO 3 HR: Performed by: OBSTETRICS & GYNECOLOGY

## 2022-04-07 PROCEDURE — 74011250636 HC RX REV CODE- 250/636: Performed by: PHYSICIAN ASSISTANT

## 2022-04-07 PROCEDURE — 77030008684 HC TU ET CUF COVD -B: Performed by: NURSE ANESTHETIST, CERTIFIED REGISTERED

## 2022-04-07 PROCEDURE — 77030014008 HC SPNG HEMSTAT J&J -C: Performed by: OBSTETRICS & GYNECOLOGY

## 2022-04-07 PROCEDURE — 88112 CYTOPATH CELL ENHANCE TECH: CPT

## 2022-04-07 PROCEDURE — 88305 TISSUE EXAM BY PATHOLOGIST: CPT

## 2022-04-07 PROCEDURE — 88307 TISSUE EXAM BY PATHOLOGIST: CPT

## 2022-04-07 PROCEDURE — 74011000250 HC RX REV CODE- 250: Performed by: NURSE ANESTHETIST, CERTIFIED REGISTERED

## 2022-04-07 PROCEDURE — 76010000877 HC OR TIME 2.5 TO 3HR INTENSV - TIER 2: Performed by: OBSTETRICS & GYNECOLOGY

## 2022-04-07 PROCEDURE — 77030026438 HC STYL ET INTUB CARD -A: Performed by: NURSE ANESTHETIST, CERTIFIED REGISTERED

## 2022-04-07 PROCEDURE — 88342 IMHCHEM/IMCYTCHM 1ST ANTB: CPT

## 2022-04-07 PROCEDURE — 74011000254 HC RX REV CODE- 254: Performed by: OBSTETRICS & GYNECOLOGY

## 2022-04-07 PROCEDURE — 77030035277 HC OBTRTR BLDELSS DISP INTU -B: Performed by: OBSTETRICS & GYNECOLOGY

## 2022-04-07 PROCEDURE — 77030035029 HC NDL INSUF VERES DISP COVD -B: Performed by: OBSTETRICS & GYNECOLOGY

## 2022-04-07 PROCEDURE — 74011250636 HC RX REV CODE- 250/636: Performed by: NURSE ANESTHETIST, CERTIFIED REGISTERED

## 2022-04-07 RX ORDER — FENTANYL CITRATE 50 UG/ML
50 INJECTION, SOLUTION INTRAMUSCULAR; INTRAVENOUS AS NEEDED
Status: DISCONTINUED | OUTPATIENT
Start: 2022-04-07 | End: 2022-04-07 | Stop reason: HOSPADM

## 2022-04-07 RX ORDER — INDOCYANINE GREEN AND WATER 25 MG
KIT INJECTION AS NEEDED
Status: DISCONTINUED | OUTPATIENT
Start: 2022-04-07 | End: 2022-04-07 | Stop reason: HOSPADM

## 2022-04-07 RX ORDER — SODIUM CHLORIDE, SODIUM LACTATE, POTASSIUM CHLORIDE, CALCIUM CHLORIDE 600; 310; 30; 20 MG/100ML; MG/100ML; MG/100ML; MG/100ML
125 INJECTION, SOLUTION INTRAVENOUS CONTINUOUS
Status: DISCONTINUED | OUTPATIENT
Start: 2022-04-07 | End: 2022-04-07 | Stop reason: HOSPADM

## 2022-04-07 RX ORDER — LEVOFLOXACIN 5 MG/ML
750 INJECTION, SOLUTION INTRAVENOUS
Status: COMPLETED | OUTPATIENT
Start: 2022-04-07 | End: 2022-04-07

## 2022-04-07 RX ORDER — MIDAZOLAM HYDROCHLORIDE 1 MG/ML
1 INJECTION, SOLUTION INTRAMUSCULAR; INTRAVENOUS AS NEEDED
Status: DISCONTINUED | OUTPATIENT
Start: 2022-04-07 | End: 2022-04-07 | Stop reason: HOSPADM

## 2022-04-07 RX ORDER — DEXAMETHASONE SODIUM PHOSPHATE 4 MG/ML
INJECTION, SOLUTION INTRA-ARTICULAR; INTRALESIONAL; INTRAMUSCULAR; INTRAVENOUS; SOFT TISSUE AS NEEDED
Status: DISCONTINUED | OUTPATIENT
Start: 2022-04-07 | End: 2022-04-07 | Stop reason: HOSPADM

## 2022-04-07 RX ORDER — METRONIDAZOLE 500 MG/100ML
500 INJECTION, SOLUTION INTRAVENOUS
Status: COMPLETED | OUTPATIENT
Start: 2022-04-07 | End: 2022-04-07

## 2022-04-07 RX ORDER — TETRACAINE HYDROCHLORIDE 5 MG/ML
1 SOLUTION OPHTHALMIC
Status: COMPLETED | OUTPATIENT
Start: 2022-04-07 | End: 2022-04-07

## 2022-04-07 RX ORDER — SODIUM CHLORIDE 0.9 % (FLUSH) 0.9 %
5-40 SYRINGE (ML) INJECTION AS NEEDED
Status: DISCONTINUED | OUTPATIENT
Start: 2022-04-07 | End: 2022-04-07 | Stop reason: HOSPADM

## 2022-04-07 RX ORDER — ROCURONIUM BROMIDE 10 MG/ML
INJECTION, SOLUTION INTRAVENOUS AS NEEDED
Status: DISCONTINUED | OUTPATIENT
Start: 2022-04-07 | End: 2022-04-07 | Stop reason: HOSPADM

## 2022-04-07 RX ORDER — ONDANSETRON 2 MG/ML
4 INJECTION INTRAMUSCULAR; INTRAVENOUS AS NEEDED
Status: DISCONTINUED | OUTPATIENT
Start: 2022-04-07 | End: 2022-04-07 | Stop reason: HOSPADM

## 2022-04-07 RX ORDER — ACETAMINOPHEN 325 MG/1
975 TABLET ORAL EVERY 6 HOURS
Status: DISCONTINUED | OUTPATIENT
Start: 2022-04-07 | End: 2022-04-08 | Stop reason: HOSPADM

## 2022-04-07 RX ORDER — TRAMADOL HYDROCHLORIDE 50 MG/1
50 TABLET ORAL
Qty: 20 TABLET | Refills: 0 | Status: SHIPPED | OUTPATIENT
Start: 2022-04-07 | End: 2022-04-12

## 2022-04-07 RX ORDER — SODIUM CHLORIDE, SODIUM LACTATE, POTASSIUM CHLORIDE, CALCIUM CHLORIDE 600; 310; 30; 20 MG/100ML; MG/100ML; MG/100ML; MG/100ML
INJECTION, SOLUTION INTRAVENOUS
Status: DISCONTINUED | OUTPATIENT
Start: 2022-04-07 | End: 2022-04-07 | Stop reason: HOSPADM

## 2022-04-07 RX ORDER — MIDAZOLAM HYDROCHLORIDE 1 MG/ML
1 INJECTION, SOLUTION INTRAMUSCULAR; INTRAVENOUS
Status: DISCONTINUED | OUTPATIENT
Start: 2022-04-07 | End: 2022-04-07 | Stop reason: HOSPADM

## 2022-04-07 RX ORDER — LIDOCAINE HYDROCHLORIDE 20 MG/ML
INJECTION, SOLUTION EPIDURAL; INFILTRATION; INTRACAUDAL; PERINEURAL AS NEEDED
Status: DISCONTINUED | OUTPATIENT
Start: 2022-04-07 | End: 2022-04-07 | Stop reason: HOSPADM

## 2022-04-07 RX ORDER — NALOXONE HYDROCHLORIDE 0.4 MG/ML
0.4 INJECTION, SOLUTION INTRAMUSCULAR; INTRAVENOUS; SUBCUTANEOUS AS NEEDED
Status: DISCONTINUED | OUTPATIENT
Start: 2022-04-07 | End: 2022-04-08 | Stop reason: HOSPADM

## 2022-04-07 RX ORDER — TRAMADOL HYDROCHLORIDE 50 MG/1
50-100 TABLET ORAL
Status: DISCONTINUED | OUTPATIENT
Start: 2022-04-07 | End: 2022-04-08 | Stop reason: HOSPADM

## 2022-04-07 RX ORDER — IBUPROFEN 200 MG
600 TABLET ORAL
Qty: 40 TABLET | Refills: 0 | Status: SHIPPED | OUTPATIENT
Start: 2022-04-07 | End: 2022-04-11

## 2022-04-07 RX ORDER — PROCHLORPERAZINE EDISYLATE 5 MG/ML
10 INJECTION INTRAMUSCULAR; INTRAVENOUS
Status: DISCONTINUED | OUTPATIENT
Start: 2022-04-07 | End: 2022-04-07 | Stop reason: SDUPTHER

## 2022-04-07 RX ORDER — ACETAMINOPHEN 325 MG/1
650 TABLET ORAL ONCE
Status: COMPLETED | OUTPATIENT
Start: 2022-04-07 | End: 2022-04-07

## 2022-04-07 RX ORDER — DIPHENHYDRAMINE HYDROCHLORIDE 50 MG/ML
12.5 INJECTION, SOLUTION INTRAMUSCULAR; INTRAVENOUS AS NEEDED
Status: DISCONTINUED | OUTPATIENT
Start: 2022-04-07 | End: 2022-04-07 | Stop reason: HOSPADM

## 2022-04-07 RX ORDER — SODIUM CHLORIDE 9 MG/ML
75 INJECTION, SOLUTION INTRAVENOUS CONTINUOUS
Status: DISCONTINUED | OUTPATIENT
Start: 2022-04-07 | End: 2022-04-08 | Stop reason: HOSPADM

## 2022-04-07 RX ORDER — SODIUM CHLORIDE 0.9 % (FLUSH) 0.9 %
5-40 SYRINGE (ML) INJECTION EVERY 8 HOURS
Status: DISCONTINUED | OUTPATIENT
Start: 2022-04-07 | End: 2022-04-07 | Stop reason: HOSPADM

## 2022-04-07 RX ORDER — DEXTROSE, SODIUM CHLORIDE, SODIUM LACTATE, POTASSIUM CHLORIDE, AND CALCIUM CHLORIDE 5; .6; .31; .03; .02 G/100ML; G/100ML; G/100ML; G/100ML; G/100ML
125 INJECTION, SOLUTION INTRAVENOUS CONTINUOUS
Status: DISCONTINUED | OUTPATIENT
Start: 2022-04-07 | End: 2022-04-07 | Stop reason: HOSPADM

## 2022-04-07 RX ORDER — ACETAMINOPHEN 325 MG/1
650 TABLET ORAL
Qty: 60 TABLET | Refills: 0 | Status: SHIPPED | OUTPATIENT
Start: 2022-04-07 | End: 2022-04-12

## 2022-04-07 RX ORDER — SUCCINYLCHOLINE CHLORIDE 20 MG/ML
INJECTION INTRAMUSCULAR; INTRAVENOUS AS NEEDED
Status: DISCONTINUED | OUTPATIENT
Start: 2022-04-07 | End: 2022-04-07 | Stop reason: HOSPADM

## 2022-04-07 RX ORDER — KETOROLAC TROMETHAMINE 30 MG/ML
15 INJECTION, SOLUTION INTRAMUSCULAR; INTRAVENOUS EVERY 6 HOURS
Status: DISCONTINUED | OUTPATIENT
Start: 2022-04-07 | End: 2022-04-08 | Stop reason: HOSPADM

## 2022-04-07 RX ORDER — HYDROMORPHONE HYDROCHLORIDE 1 MG/ML
0.5 INJECTION, SOLUTION INTRAMUSCULAR; INTRAVENOUS; SUBCUTANEOUS
Status: DISCONTINUED | OUTPATIENT
Start: 2022-04-07 | End: 2022-04-08 | Stop reason: HOSPADM

## 2022-04-07 RX ORDER — LIDOCAINE HYDROCHLORIDE 10 MG/ML
0.5 INJECTION, SOLUTION EPIDURAL; INFILTRATION; INTRACAUDAL; PERINEURAL AS NEEDED
Status: DISCONTINUED | OUTPATIENT
Start: 2022-04-07 | End: 2022-04-07 | Stop reason: HOSPADM

## 2022-04-07 RX ORDER — SODIUM CHLORIDE 0.9 % (FLUSH) 0.9 %
5-40 SYRINGE (ML) INJECTION AS NEEDED
Status: DISCONTINUED | OUTPATIENT
Start: 2022-04-07 | End: 2022-04-08 | Stop reason: HOSPADM

## 2022-04-07 RX ORDER — MORPHINE SULFATE 2 MG/ML
2 INJECTION, SOLUTION INTRAMUSCULAR; INTRAVENOUS
Status: DISCONTINUED | OUTPATIENT
Start: 2022-04-07 | End: 2022-04-07 | Stop reason: HOSPADM

## 2022-04-07 RX ORDER — PROPOFOL 10 MG/ML
INJECTION, EMULSION INTRAVENOUS AS NEEDED
Status: DISCONTINUED | OUTPATIENT
Start: 2022-04-07 | End: 2022-04-07 | Stop reason: HOSPADM

## 2022-04-07 RX ORDER — DIPHENHYDRAMINE HYDROCHLORIDE 50 MG/ML
12.5 INJECTION, SOLUTION INTRAMUSCULAR; INTRAVENOUS
Status: DISCONTINUED | OUTPATIENT
Start: 2022-04-07 | End: 2022-04-08 | Stop reason: HOSPADM

## 2022-04-07 RX ORDER — OXYCODONE HYDROCHLORIDE 5 MG/1
5 TABLET ORAL AS NEEDED
Status: DISCONTINUED | OUTPATIENT
Start: 2022-04-07 | End: 2022-04-07 | Stop reason: HOSPADM

## 2022-04-07 RX ORDER — DOCUSATE SODIUM 100 MG/1
100 CAPSULE, LIQUID FILLED ORAL 2 TIMES DAILY
Qty: 40 CAPSULE | Refills: 1 | Status: SHIPPED | OUTPATIENT
Start: 2022-04-07 | End: 2022-05-24 | Stop reason: ALTCHOICE

## 2022-04-07 RX ORDER — HYDROMORPHONE HYDROCHLORIDE 2 MG/ML
INJECTION, SOLUTION INTRAMUSCULAR; INTRAVENOUS; SUBCUTANEOUS AS NEEDED
Status: DISCONTINUED | OUTPATIENT
Start: 2022-04-07 | End: 2022-04-07 | Stop reason: HOSPADM

## 2022-04-07 RX ORDER — ONDANSETRON 2 MG/ML
4 INJECTION INTRAMUSCULAR; INTRAVENOUS
Status: DISCONTINUED | OUTPATIENT
Start: 2022-04-07 | End: 2022-04-08 | Stop reason: HOSPADM

## 2022-04-07 RX ORDER — SODIUM CHLORIDE 0.9 % (FLUSH) 0.9 %
5-40 SYRINGE (ML) INJECTION EVERY 8 HOURS
Status: DISCONTINUED | OUTPATIENT
Start: 2022-04-07 | End: 2022-04-08 | Stop reason: HOSPADM

## 2022-04-07 RX ORDER — FENTANYL CITRATE 50 UG/ML
25 INJECTION, SOLUTION INTRAMUSCULAR; INTRAVENOUS
Status: DISCONTINUED | OUTPATIENT
Start: 2022-04-07 | End: 2022-04-07 | Stop reason: HOSPADM

## 2022-04-07 RX ORDER — FENTANYL CITRATE 50 UG/ML
INJECTION, SOLUTION INTRAMUSCULAR; INTRAVENOUS AS NEEDED
Status: DISCONTINUED | OUTPATIENT
Start: 2022-04-07 | End: 2022-04-07 | Stop reason: HOSPADM

## 2022-04-07 RX ORDER — BUPIVACAINE HYDROCHLORIDE 5 MG/ML
INJECTION, SOLUTION EPIDURAL; INTRACAUDAL AS NEEDED
Status: DISCONTINUED | OUTPATIENT
Start: 2022-04-07 | End: 2022-04-07 | Stop reason: HOSPADM

## 2022-04-07 RX ORDER — HYDRALAZINE HYDROCHLORIDE 20 MG/ML
5 INJECTION INTRAMUSCULAR; INTRAVENOUS ONCE
Status: COMPLETED | OUTPATIENT
Start: 2022-04-07 | End: 2022-04-07

## 2022-04-07 RX ORDER — MIDAZOLAM HYDROCHLORIDE 1 MG/ML
INJECTION, SOLUTION INTRAMUSCULAR; INTRAVENOUS AS NEEDED
Status: DISCONTINUED | OUTPATIENT
Start: 2022-04-07 | End: 2022-04-07 | Stop reason: HOSPADM

## 2022-04-07 RX ORDER — DOCUSATE SODIUM 100 MG/1
200 CAPSULE, LIQUID FILLED ORAL DAILY
Status: DISCONTINUED | OUTPATIENT
Start: 2022-04-08 | End: 2022-04-08 | Stop reason: HOSPADM

## 2022-04-07 RX ORDER — HYDRALAZINE HYDROCHLORIDE 20 MG/ML
INJECTION INTRAMUSCULAR; INTRAVENOUS
Status: DISPENSED
Start: 2022-04-07 | End: 2022-04-08

## 2022-04-07 RX ADMIN — DEXAMETHASONE SODIUM PHOSPHATE 4 MG: 4 INJECTION, SOLUTION INTRAMUSCULAR; INTRAVENOUS at 11:59

## 2022-04-07 RX ADMIN — ACETAMINOPHEN 650 MG: 325 TABLET ORAL at 09:59

## 2022-04-07 RX ADMIN — ACETAMINOPHEN 975 MG: 325 TABLET ORAL at 18:37

## 2022-04-07 RX ADMIN — ROCURONIUM BROMIDE 30 MG: 10 SOLUTION INTRAVENOUS at 12:41

## 2022-04-07 RX ADMIN — PROPOFOL 50 MG: 10 INJECTION, EMULSION INTRAVENOUS at 12:41

## 2022-04-07 RX ADMIN — FENTANYL CITRATE 100 MCG: 50 INJECTION, SOLUTION INTRAMUSCULAR; INTRAVENOUS at 11:49

## 2022-04-07 RX ADMIN — MIDAZOLAM 2 MG: 1 INJECTION INTRAMUSCULAR; INTRAVENOUS at 11:33

## 2022-04-07 RX ADMIN — SUCCINYLCHOLINE CHLORIDE 140 MG: 20 INJECTION, SOLUTION INTRAMUSCULAR; INTRAVENOUS at 11:49

## 2022-04-07 RX ADMIN — SODIUM CHLORIDE 75 ML/HR: 900 INJECTION, SOLUTION INTRAVENOUS at 15:45

## 2022-04-07 RX ADMIN — SODIUM CHLORIDE, POTASSIUM CHLORIDE, SODIUM LACTATE AND CALCIUM CHLORIDE: 600; 310; 30; 20 INJECTION, SOLUTION INTRAVENOUS at 11:33

## 2022-04-07 RX ADMIN — ROCURONIUM BROMIDE 25 MG: 10 SOLUTION INTRAVENOUS at 11:57

## 2022-04-07 RX ADMIN — ROCURONIUM BROMIDE 10 MG: 10 SOLUTION INTRAVENOUS at 13:29

## 2022-04-07 RX ADMIN — KETOROLAC TROMETHAMINE 15 MG: 30 INJECTION, SOLUTION INTRAMUSCULAR; INTRAVENOUS at 18:38

## 2022-04-07 RX ADMIN — LIDOCAINE HYDROCHLORIDE 60 MG: 20 INJECTION, SOLUTION EPIDURAL; INFILTRATION; INTRACAUDAL; PERINEURAL at 11:49

## 2022-04-07 RX ADMIN — LEVOFLOXACIN 750 MG: 5 INJECTION, SOLUTION INTRAVENOUS at 11:48

## 2022-04-07 RX ADMIN — METRONIDAZOLE 500 MG: 500 INJECTION, SOLUTION INTRAVENOUS at 10:39

## 2022-04-07 RX ADMIN — PROPOFOL 150 MG: 10 INJECTION, EMULSION INTRAVENOUS at 11:49

## 2022-04-07 RX ADMIN — TETRACAINE HYDROCHLORIDE 1 DROP: 5 SOLUTION OPHTHALMIC at 18:59

## 2022-04-07 RX ADMIN — SODIUM CHLORIDE, POTASSIUM CHLORIDE, SODIUM LACTATE AND CALCIUM CHLORIDE 125 ML/HR: 600; 310; 30; 20 INJECTION, SOLUTION INTRAVENOUS at 10:39

## 2022-04-07 RX ADMIN — HYDRALAZINE HYDROCHLORIDE 5 MG: 20 INJECTION, SOLUTION INTRAMUSCULAR; INTRAVENOUS at 15:27

## 2022-04-07 RX ADMIN — ROCURONIUM BROMIDE 20 MG: 10 SOLUTION INTRAVENOUS at 12:15

## 2022-04-07 RX ADMIN — HYDROMORPHONE HYDROCHLORIDE 1 MG: 2 INJECTION, SOLUTION INTRAMUSCULAR; INTRAVENOUS; SUBCUTANEOUS at 12:50

## 2022-04-07 RX ADMIN — SODIUM CHLORIDE, POTASSIUM CHLORIDE, SODIUM LACTATE AND CALCIUM CHLORIDE: 600; 310; 30; 20 INJECTION, SOLUTION INTRAVENOUS at 12:45

## 2022-04-07 RX ADMIN — PROPOFOL 50 MG: 10 INJECTION, EMULSION INTRAVENOUS at 12:16

## 2022-04-07 RX ADMIN — ROCURONIUM BROMIDE 5 MG: 10 SOLUTION INTRAVENOUS at 11:49

## 2022-04-07 RX ADMIN — SODIUM CHLORIDE, PRESERVATIVE FREE 10 ML: 5 INJECTION INTRAVENOUS at 21:00

## 2022-04-07 NOTE — ANESTHESIA POSTPROCEDURE EVALUATION
Post-Anesthesia Evaluation and Assessment    Patient: Shea Villanueva MRN: [id number]  SSN: [id number]    YOB: 1955  Age: 74 y.o. Sex: female      I have evaluated the patient and they are stable and ready for discharge from the PACU. Cardiovascular Function/Vital Signs  Visit Vitals  BP (!) 172/66   Pulse 65   Temp 36.4 °C (97.6 °F)   Resp 12   SpO2 99%       Patient is status post General anesthesia for Procedure(s):  ROBOTIC ASSISTED TOTAL LAPAROSCOPIC HYSTERECTOMY, BILATERAL SALPINGO-OOPHORECTOMY, SENTINEL LYMPH NODE MAPPING AND BIOPSY. Nausea/Vomiting: None    Postoperative hydration reviewed and adequate. Pain:  Pain Scale 1: Numeric (0 - 10) (04/07/22 1428)  Pain Intensity 1: 0 (04/07/22 1428)   Managed    Neurological Status:   Neuro (WDL): Within Defined Limits (04/07/22 0951)  Neuro  Neurologic State: Drowsy (04/07/22 1428)  LUE Motor Response: Purposeful (04/07/22 1428)  LLE Motor Response: Purposeful (04/07/22 1428)  RUE Motor Response: Purposeful (04/07/22 1428)  RLE Motor Response: Purposeful (04/07/22 1428)   At baseline    Mental Status, Level of Consciousness: Alert and  oriented to person, place, and time    Pulmonary Status:   O2 Device: Nasal cannula (04/07/22 1430)   Adequate oxygenation and airway patent    Complications related to anesthesia: None    Post-anesthesia assessment completed. No concerns    Signed By: Alejandra Luong MD     April 7, 2022              Procedure(s):  ROBOTIC ASSISTED TOTAL LAPAROSCOPIC HYSTERECTOMY, BILATERAL SALPINGO-OOPHORECTOMY, SENTINEL LYMPH NODE MAPPING AND BIOPSY. general    <BSHSIANPOST>    INITIAL Post-op Vital signs:   Vitals Value Taken Time   /66 04/07/22 1445   Temp 36.4 °C (97.6 °F) 04/07/22 1430   Pulse 56 04/07/22 1500   Resp 10 04/07/22 1500   SpO2 100 % 04/07/22 1500   Vitals shown include unvalidated device data.

## 2022-04-07 NOTE — PERIOP NOTES
Dr Charleen Velarde to bedside to see pt regarding her eye  Ointment placed in eye by him and eye is taped shut    TRANSFER - OUT REPORT:    Verbal report given to Kathe Vallejo (name) on Mallika Garsia  being transferred to [facility] (unit) for routine post - op       Report consisted of patients Situation, Background, Assessment and   Recommendations(SBAR). Time Pre op antibiotic given:1039 and 1148  Anesthesia Stop time: 4584  Richard Present on Transfer to floor:y  Order for Richard on Chart:y  Discharge Prescriptions with Chart:n    Information from the following report(s) SBAR, OR Summary, Intake/Output, MAR and Accordion was reviewed with the receiving nurse. Opportunity for questions and clarification was provided. Is the patient on 02? NO       L/Min        Other     Is the patient on a monitor? NO    Is the nurse transporting with the patient? NO    Surgical Waiting Area notified of patient's transfer from PACU?  YES      The following personal items collected during your admission accompanied patient upon transfer:   Dental Appliance: Dental Appliances: None  Vision: Visual Aid: None  Hearing Aid:    Jewelry:    Clothing: Clothing:  (belonging bag sent to pacu)  Other Valuables:    Valuables sent to safe:        Clothes to floor with pt

## 2022-04-07 NOTE — ANESTHESIA PREPROCEDURE EVALUATION
Relevant Problems   ENDOCRINE   (+) Obesity      PERSONAL HX & FAMILY HX OF CANCER   (+) Carcinosarcoma of endometrium (HCC)   (+) Malignant neoplasm of uterus (HCC)       Anesthetic History   No history of anesthetic complications            Review of Systems / Medical History  Patient summary reviewed, nursing notes reviewed and pertinent labs reviewed    Pulmonary  Within defined limits                 Neuro/Psych   Within defined limits           Cardiovascular  Within defined limits                Exercise tolerance: >4 METS     GI/Hepatic/Renal  Within defined limits              Endo/Other  Within defined limits      Morbid obesity and cancer     Other Findings            Physical Exam    Airway  Mallampati: II  TM Distance: > 6 cm  Neck ROM: normal range of motion   Mouth opening: Normal     Cardiovascular  Regular rate and rhythm,  S1 and S2 normal,  no murmur, click, rub, or gallop             Dental  No notable dental hx       Pulmonary  Breath sounds clear to auscultation               Abdominal  GI exam deferred       Other Findings            Anesthetic Plan    ASA: 3  Anesthesia type: general          Induction: Intravenous  Anesthetic plan and risks discussed with: Patient

## 2022-04-07 NOTE — DISCHARGE INSTRUCTIONS
[address] [place] [address] Marlys Mathias [address], [address]  P [phone]  F [phone]     Keyla Iraheta      Dear Ms. Carlos Campbell,      Please review your instructions with your nurse and ask any questions so you have all the information you need to recover well at home. If you do not feel you have everything you need to succeed and be safe after you leave the hospital, please discuss these concerns with your nurse. As always, call for any questions at home. Your doctor: Dr. Remy Riley  Diagnosis: ENDOMETRIAL CANCER  Procedure: Procedure(s):  ROBOTIC ASSISTED TOTAL LAPAROSCOPIC HYSTERECTOMY, BILATERAL SALPINGO-OOPHORECTOMY, SENTINEL LYMPH NODE MAPPING AND BIOPSY  Date of Discharge: 4/8/22      Take Home Medications     See Discharge Medication Review provided to you by your nurse. If you did not receive one, request this prior to your discharge. · It is important that you take your medications as they are prescribed. Your prescriptions are sent to your pharmacy on record. · Keep your medications in the bottles provided by the pharmacist and keep a list of the medication names, dosages, times to be taken and what they are for in your wallet. · Do not take other medications without consulting your doctor. · You may take acetaminophen (Tylenol) and ibuprofen (Advil) for pain. If this is not sufficient for your pain, take tramadol or other pain medication you were provided. · You should take a daily gentle stool softener such as a colace pill or dulcolax suppository for constipation as this is not uncommon after surgery and/or while on pain medication. If not prescribed, this can be found over the counter. If constipation persists for >24 hours you should take a dose of Milk of Magnesia. Call if your constipation continues. Diet    · Stay hydrated and drink fluids such as gatorade and water.  This will also help prevent constipation and dehydration. Limit somewhat any usual caffeine intake of beverages such as soda, tea and coffee as this may serve to dehydrate you. · For the first 2-3 days keep a low fiber diet avoiding raw vegetables or fruits with skin. A diet consisting of soup, cereal, yogurt, eggs, fish, Boost/Ensure. Avoid fatty/greasy foods. · If nauseated, keep your diet limited to liquids and call if this persists. Activity    · If possible, have someone with you at all times until you feel stable. · Gradually increase your activity each day. There are generally no restrictions on walking, climbing stairs or riding in a car. Walk at least 4 times per day. Walking will help reduce the risk of blood clots and constipation. · Showers are okay. If you have an incision, no tub bathing/swimming for two weeks. · No driving for 2 week and/or while on pain medication. · The following restrictions apply:  1. No heavy lifting greater than 15 lbs for 4 weeks, then 25 lbs for the next 4 weeks. 2. If you had any vaginal procedure or a hysterectomy, nothing per vagina for 6-8 weeks. 3. You may experience vaginal spotting. Should the bleeding require more that 4 pads a day please call our office. Incision    · You should expect some discomfort in the area of your incision, particularly as you increase your activity. If you notice an area of increasing redness or new drainage, please call your doctor. · Your incisions will have buried, absorbable sutures, which do not need to be removed and are covered by protective glue. Please allow this to fall off on its own over time and refrain from peeling it off unless it is no longer covering the incision. Causes For Concern    If any of the following occur, please call our office and speak with the Nurse/aid who will help you with your problem or ask the doctor to call you.  Problems with the incision, including increasing pain, swelling, redness or drainage.     Inability to pass urine    Increasing abdominal pain despite medication   Persisting nausea or vomiting.  Fever or chills and a temperature >101F   Constipation (no bowel movement for three days).  Diarrhea (more than three watery stools within 24 hours).  Excessive vaginal or wound bleeding.  If after hours and you cannot reach an on-call physician, call 911. Follow-Up  Call [phone] to schedule the following appointments with Dr. Eric Jonas:   Telephone virtual-visit in 10-14 days to discuss your pathology results.  In-office visit for your postoperative exam in 6 weeks from surgery. Information obtained by :  I understand that if any problems occur once I am at home I am to contact my physician. I understand and acknowledge receipt of the instructions indicated above.                                                                                                                                            Physician's or R.N.'s Signature                                                                  Date/Time                                                                                                                                              Patient or Representative Signature                                                          Date/Time

## 2022-04-07 NOTE — PERIOP NOTES
Attempted to call report  Nurse to call back    [phone]  Pt c/o of left eye feeling 'scratchy' and watering  Dr Velma Hahn notified and is coming to bedside

## 2022-04-07 NOTE — BRIEF OP NOTE
Brief Postoperative Note    Patient: Zahra Jordan  YOB: 1955  MRN: [id number]    Date of Procedure: 4/7/2022     Pre-Op Diagnosis: ENDOMETRIAL CANCER    Post-Op Diagnosis: Same as preoperative diagnosis. Procedure(s):  ROBOTIC ASSISTED TOTAL LAPAROSCOPIC HYSTERECTOMY, BILATERAL SALPINGO-OOPHORECTOMY, SENTINEL LYMPH NODE MAPPING AND BIOPSY    Surgeon(s): Lashell Jain MD    Surgical Assistant: Physician Assistant: Odilia García PA-C    Anesthesia: General     Estimated Blood Loss (mL): less than 50     Complications: None    Specimens:   ID Type Source Tests Collected by Time Destination   1 : RIGHT PELVIC SENTINEL LYMPH NODE Fresh Lymph Node  Lashell Jain MD 4/7/2022 1249 Pathology   2 : LEFT PELVIC SENTINEL [facility] NODE Fresh Lymph Node  Lashell Jain MD 4/7/2022 1302 Pathology   3 : UTERUS CERVIX BILATERAL FALLOPIAN TUBES AND OVARIES Fresh Uterus with Bilateral Fallopian tubes and Ovaries  Lashell Jain MD 4/7/2022 1337 Pathology   1 : PELVIC WASHINGS Fresh Pelvis  Lashell Jain MD 4/7/2022 1252 Cytology        Implants: * No implants in log *    Drains: * No LDAs found *    Findings: On laparoscopic survey, the uterus is 12-14 weeks size with a number of fundal fibroids. Normal appearing bilateral tubes/ovaries. Right pelvic sentinel lymph nodes mapped along the right external iliac vessels. Left pelvic sentinel lymph nodes mapped along the left external iliac vessels, as well as one in the obturator space. Normal appearing rectosigmoid colon, peritoneum, small bowel, omentum, liver edge, and diaphragm. Uterus was large and required morcellation. Given the size of the uterus and the extra time to perform the hysterectomy and morcellate, a 22-modifier will added.      Electronically Signed by Mica Chopra MD on 4/7/2022 at 2:05 PM

## 2022-04-07 NOTE — PROGRESS NOTES
Just prior to leaving PACU, pt c/o OS pain. Pt is tearing, mild itching and pain on blinking. No FB on exam, but erythematous tissues. Discussed conservative tx w/pt: Lacrilube, patching overnight. Pt accepts tx. Patched L eye after lacrilube. I have asked pt to have RN on floor call anesthesia if pain persists.

## 2022-04-07 NOTE — INTERVAL H&P NOTE
Date of Surgery Update:  Kimberly Howell was seen and examined. History and physical has been reviewed. The patient has been examined.  There have been no significant clinical changes since the completion of the originally dated History and Physical.    Signed By: Vickie Hong MD     April 7, 2022 10:26 AM

## 2022-04-07 NOTE — OP NOTES
Gynecologic Oncology Operative Report    [place]  4/7/2022    Pre-operative dx:  1) Endometrial Cancer, Carcinosarcoma; 2) Morbid obesity, BMI 37     Post-operative dx:  1) Endometrial Cancer, Carcinosarcoma; 2) Morbid obesity, BMI 37      Procedure:    Robotic-assisted total laparoscopic hysterectomy, Bilateral salpingo-oophorectomy, Bilateral pelvic sentinel lymph node mapping and biopsy     Surgeon:  Nat Quezada MD     Assistant:  Charito Chacon surgical assistance was required throughout the case due to the complexity of the procedure. He assisted with dissection, development of the retroperitoneal spaces, and identification of pertinent anatomy during the procedure. 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 Normal appearing bilateral tubes/ovaries. Right pelvic sentinel lymph nodes mapped along the right external iliac vessels. Left pelvic sentinel lymph nodes mapped along the left external iliac vessels, as well as one in the obturator space. Normal appearing rectosigmoid colon, peritoneum, small bowel, omentum, liver edge, and diaphragm. Uterus was large and required morcellation. Given the size of the uterus and the extra time to perform the hysterectomy and morcellate, a 22-modifier will added. Operative report: After the risks, benefits, indications, and alternatives of the procedure were discussed with the patient and informed consent was obtained, the patient was taken to the operating room.  She was positively identified, administered general anesthesia, and then placed in the dorsal lithotomy position in Ghulam Cool stirrups. An exam under anesthesia was performed. She was then prepped and draped in the usual fashion. A hartman catheter was inserted. An open-sided speculum was used to visualize the cervix. The cervix was grasped with a single-tooth tenaculum and then progressively dilated using cervical dilators. Indocyanine green was then injected at 3 and 9 o'clock on each side of the cervix in preparation for sentinel lymph node mapping. I then placed a V-Care uterine manipulator without difficulty. Attention was then turned to laparoscopy. A horizontal 8-mm skin incision was made at Norris's point in the left upper quadrant. Using an Optiview technique, an 8-mm AirSeal port was placed using a 5-mm laparoscopic scope. Insufflation was applied and intraperitoneal placement was confirmed via direct visualization with the laparoscopic. The abdomen was then insufflated to a pressure of 15 mm Hg. Attention was then turned to placement of the robotic trocars. Under direct visualization, 8-mm DaVinci trocars were placed: one above the umbilicus, one each in both the right and left lateral quadrants, and one in the right upper quadrant. These were placed approximately 8 cm apart. Positioning of the trocars in the abdominal wall were then adjusted to locate the point of articulation at the level of the fascia. The patient was then placed in steep Trendelenberg position and the bowel was displaced into the upper abdomen. The camera port was then docked and the camera was inserted into the #2 port and advanced. These trocars were then docked with the [employer]. A fenestrated bipolar grasper was placed in arm #1, monopolar scissors placed in arm #3, and a Prograsp placed in arm #4. Pelvic washings were obtained. Attention was then turned to the robotic console and proceeding with hysterectomy and pelvic lymph node mapping.  Bilateral round ligaments were cauterized and divided with the monopolar scissors and the bilateral pelvic side-wall retroperitoneum entered and developed. Bilateral ureters were identified and preserved. Bilateral iliac vessels, superior vesicle artery, and obturator nerve were identified and preserved. [place] lymph node mapping identified the above sentinel lymph nodes. Bilateral sentinel lymph nodes were skeletonized and removed and sent for permanent pathology. Bilateral infundibulopelvic ligaments were skeletonized, then sealed with bipolar electrocautery and divided with monopolar scissors. Dissection was continued inferiorly along the broad ligament and the bladder dissected off the lower uterine segment and cervix. Dissection was difficult in this area given the size of the uterus. Bilateral uterine arteries were skeletonized, then cauterized with bipolar electrocautery and divided using monopolar scissors. Of note the uterine arteries were enlarged secondary to the size of the uterus. A circumferential colpotomy was then carried along the -care. The specimen of uterus, cervix, and bilateral tubes/ovaries were then placed in a 15-mm EndoCatch bag and then removed via the vagina via morcellation without spillage, and then sent for permanent pathology. The vaginal colpotomy was closed with 0-Vicryl V-lock suture using a pramod-suture cut in arm #3. Given the patient's obesity, the para-aortic region was not well visualized. Her sentinel lymph nodes mapped well also, so a para-aortic joy dissection was not performed. The pelvis was irrigated and made hemostatic. The intra-abdominal pressure was then decreased and all planes of dissection were confirmed hemostatic. Fibrillar was placed along all planes of dissection. The insufflation was released prior to removal of all port sites. All skin incisions were closed with 4-0 monocryl subcuticular stitch. Skin glue was applied to all skin incisions.  The vagina was then inspected and the vaginal cuff was intact and there were no lacerations along the vaginal canal. The patient was taken out of stirrups, awakened from anesthesia, and taken to the recovery room in stable condition. The patient tolerated the procedure well. All instrument, sponge, and needle counts were correct.         Surya Sauceda MD  4/7/2022  2:08 PM

## 2022-04-08 VITALS
RESPIRATION RATE: 16 BRPM | SYSTOLIC BLOOD PRESSURE: 137 MMHG | OXYGEN SATURATION: 95 % | TEMPERATURE: 98.2 F | HEART RATE: 73 BPM | DIASTOLIC BLOOD PRESSURE: 77 MMHG

## 2022-04-08 LAB
ANION GAP SERPL CALC-SCNC: 6 MMOL/L (ref 5–15)
BUN SERPL-MCNC: 8 MG/DL (ref 6–20)
BUN/CREAT SERPL: 11 (ref 12–20)
CALCIUM SERPL-MCNC: 8.9 MG/DL (ref 8.5–10.1)
CHLORIDE SERPL-SCNC: 107 MMOL/L (ref 97–108)
CO2 SERPL-SCNC: 23 MMOL/L (ref 21–32)
CREAT SERPL-MCNC: 0.75 MG/DL (ref 0.55–1.02)
ERYTHROCYTE [DISTWIDTH] IN BLOOD BY AUTOMATED COUNT: 12.7 % (ref 11.5–14.5)
GLUCOSE SERPL-MCNC: 123 MG/DL (ref 65–100)
HCT VFR BLD AUTO: 40.6 % (ref 35–47)
HGB BLD-MCNC: 13.8 G/DL (ref 11.5–16)
MCH RBC QN AUTO: 31.5 PG (ref 26–34)
MCHC RBC AUTO-ENTMCNC: 34 G/DL (ref 30–36.5)
MCV RBC AUTO: 92.7 FL (ref 80–99)
NRBC # BLD: 0 K/UL (ref 0–0.01)
NRBC BLD-RTO: 0 PER 100 WBC
PLATELET # BLD AUTO: 190 K/UL (ref 150–400)
PMV BLD AUTO: 12.2 FL (ref 8.9–12.9)
POTASSIUM SERPL-SCNC: 3.9 MMOL/L (ref 3.5–5.1)
RBC # BLD AUTO: 4.38 M/UL (ref 3.8–5.2)
SODIUM SERPL-SCNC: 136 MMOL/L (ref 136–145)
WBC # BLD AUTO: 11.8 K/UL (ref 3.6–11)

## 2022-04-08 PROCEDURE — 80048 BASIC METABOLIC PNL TOTAL CA: CPT

## 2022-04-08 PROCEDURE — 74011250636 HC RX REV CODE- 250/636: Performed by: PHYSICIAN ASSISTANT

## 2022-04-08 PROCEDURE — 74011000250 HC RX REV CODE- 250: Performed by: PHYSICIAN ASSISTANT

## 2022-04-08 PROCEDURE — 85027 COMPLETE CBC AUTOMATED: CPT

## 2022-04-08 PROCEDURE — 36415 COLL VENOUS BLD VENIPUNCTURE: CPT

## 2022-04-08 PROCEDURE — 74011250637 HC RX REV CODE- 250/637: Performed by: PHYSICIAN ASSISTANT

## 2022-04-08 RX ADMIN — DOCUSATE SODIUM 200 MG: 100 CAPSULE, LIQUID FILLED ORAL at 09:25

## 2022-04-08 RX ADMIN — ACETAMINOPHEN 975 MG: 325 TABLET ORAL at 06:04

## 2022-04-08 RX ADMIN — SODIUM CHLORIDE, PRESERVATIVE FREE 10 ML: 5 INJECTION INTRAVENOUS at 06:04

## 2022-04-08 RX ADMIN — KETOROLAC TROMETHAMINE 15 MG: 30 INJECTION, SOLUTION INTRAMUSCULAR; INTRAVENOUS at 06:04

## 2022-04-08 RX ADMIN — SODIUM CHLORIDE 75 ML/HR: 900 INJECTION, SOLUTION INTRAVENOUS at 05:15

## 2022-04-08 RX ADMIN — ACETAMINOPHEN 975 MG: 325 TABLET ORAL at 00:33

## 2022-04-08 RX ADMIN — KETOROLAC TROMETHAMINE 15 MG: 30 INJECTION, SOLUTION INTRAMUSCULAR; INTRAVENOUS at 00:34

## 2022-04-08 NOTE — PROGRESS NOTES
Removed eye patch from left eye . No redness or swelling noted pt denies any pain or blurry vision in both eyes.

## 2022-04-08 NOTE — PROGRESS NOTES
Bedside and Verbal shift change report given to Scarlett Valentine RN (oncoming nurse) by Michael Garcia RN (offgoing nurse). Report included the following information SBAR, Kardex, ED Summary, OR Summary, Procedure Summary, Intake/Output, MAR, Accordion, Recent Results and Med Rec Status.

## 2022-04-08 NOTE — PROGRESS NOTES
Medicare Outpatient Observation Notice (MOON) provided to patient/representative with verbal explanation of the notice. Time allotted for questions regarding the notice. Patient /representative provided a completed copy of the MOON notice. Copy placed on bedside chart. Shun Akhtar, Care Management Assistant.

## 2022-04-08 NOTE — PROGRESS NOTES
[facility]  [address] [facility], [address], [address] [place]  P [phone]  F [phone]       Patient Name: Aggie Ch Date: 4/7/2022   OR Date: 4/7/2022   Visit Date: 4/8/2022        SUBJECTIVE:  Doing well this morning. Pain controlled with tylenol and ultram  Tolerating regular diet. On nausea/vomiting.  + flatus. Richard out. Waiting to void this morning.      OBJECTIVE:    Patient Vitals for the past 24 hrs:   Temp Pulse Resp BP SpO2   04/08/22 0737 98.2 °F (36.8 °C) 73 16 137/77 95 %   04/08/22 0458 98.5 °F (36.9 °C) 71 16 (!) 146/78 96 %   04/08/22 0033 98.5 °F (36.9 °C) 78 16 116/75 93 %   04/07/22 2059 98.1 °F (36.7 °C) 75 16 131/81 94 %   04/07/22 1857 98.3 °F (36.8 °C) 74 17 137/66 94 %   04/07/22 1754 97.8 °F (36.6 °C) 78 16 (!) 146/82 97 %   04/07/22 1652 97.5 °F (36.4 °C) 76 15 (!) 173/76 96 %   04/07/22 1615  74 15 (!) 164/73 96 %   04/07/22 1600  77 19 (!) 151/83 96 %   04/07/22 1555  74 15  95 %   04/07/22 1545  64 10 (!) 141/73 99 %   04/07/22 1535  64 12 (!) 168/71 99 %   04/07/22 1530  65 14 (!) 164/73 100 %   04/07/22 1515 97.5 °F (36.4 °C) 70 17 (!) 166/80 100 %   04/07/22 1500  (!) 56 10 (!) 154/74 100 %   04/07/22 1445  65 12 (!) 172/66 99 %   04/07/22 1442  68 17 (!) 173/95 100 %   04/07/22 1440  70 22  96 %   04/07/22 1435  62 13 (!) 166/74 100 %   04/07/22 1432  68 18  100 %   04/07/22 1430 97.6 °F (36.4 °C) 64 14 (!) 158/72 100 %   04/07/22 1429  67 14  99 %   04/07/22 1428 97.6 °F (36.4 °C)   (!) 159/72 98 %       Date 04/07/22 0700 - 04/08/22 0659 04/08/22 0700 - 04/09/22 0659   Shift 4703-2279 9078-7877 24 Hour Total 5843-8519 4127-1933 24 Hour Total   INTAKE   I.V. 1100  1100        I.V. 100  100        Volume (lactated Ringers infusion) 1000  1000      Shift Total 1100  1100      OUTPUT   Urine 700 1100 1800 200  200     Urine Voided    200  200     Urine Output 250  250        Urine Output (mL) ([REMOVED] Urinary Catheter 04/07/22 2- way; Richard) 450 1100 1550      Blood 50  50        Estimated Blood Loss 50  50      Shift Total 750 1100 1850 200  200    -1100 -750 -200  -200   Weight (kg)             Physical Exam     General:  alert, cooperative, no distress     Cardiac:  Regular rate and rhythm        Lungs:  clear to auscultation bilaterally  Abdomen:  soft, non-tender, without masses or organomegaly       Wound:  clean, dry   Extremity: extremities normal, atraumatic, no cyanosis or edema    Data Review  Lab Results   Component Value Date/Time    WBC 11.8 (H) 04/08/2022 05:26 AM    HGB 13.8 04/08/2022 05:26 AM    HCT 40.6 04/08/2022 05:26 AM    MCV 92.7 04/08/2022 05:26 AM    MCH 31.5 04/08/2022 05:26 AM    PLATELET 533 47/14/8944 05:26 AM     Lab Results   Component Value Date/Time    Sodium 136 04/08/2022 05:26 AM    Potassium 3.9 04/08/2022 05:26 AM    Chloride 107 04/08/2022 05:26 AM    CO2 23 04/08/2022 05:26 AM    Glucose 123 (H) 04/08/2022 05:26 AM    BUN 8 04/08/2022 05:26 AM    Creatinine 0.75 04/08/2022 05:26 AM    Calcium 8.9 04/08/2022 05:26 AM    Albumin 4.2 03/31/2022 02:58 PM    Bilirubin, total 0.3 03/31/2022 02:58 PM    ALT (SGPT) 32 03/31/2022 02:58 PM    Alk. 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 Hemodynamically stable this morning. Renal:  good urine output overnight. Creatinine 0.75. Waiting to void       FEN/GI:  no nausea. + flatus. Tolerating diet. ID/Wound:  afebrile. Abdomen and incisions benign. PPX:  SCDs. Continue OOB. Incentive spirometer   Dispostion:  Doing well postoperatively. Continue routine post op care. Discharge instructions reviewed. Questions answered.          Sarahy Montes, FREDI  4/8/2022  10:51 AM

## 2022-04-11 PROBLEM — C54.1 ENDOMETRIAL CANCER (HCC): Status: ACTIVE | Noted: 2022-04-07

## 2022-04-25 NOTE — PROGRESS NOTES
[address] [facility] [address], [address] [facility]  P [phone]  F [phone]    Office Note  Patient ID:  Name:  Radha Thurston  MRN:  [id number]  :  1955/66 y.o. Date:  2022      HISTORY OF PRESENT ILLNESS:  Ms. Radha Thurston is a 74 y.o. 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 On 2022 underwent Robotic-assisted total laparoscopic hysterectomy, Bilateral salpingo-oophorectomy, Bilateral pelvic sentinel lymph node mapping and biopsy. 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 4mm out of 15 myometrial invasion. Negative washings. Negative SLNs. Negative CT C/A/P 3/23/2022. Presents today via virtual visit to discuss pathology. Initial History:  Ms. Radha Thurston is a 74 y.o.  postmenopausal female who presents as a new patient from Dr. Paola Bowles for carcinosarcoma of the endometrium. The patient reported vaginal spotting/bleeding in February (). She underwent a pelvic ultrasound and endometrial biopsy with Dr. Paola Bowles, which ultimately demonstrated carcinosarcoma. The patient denies pelvic or abdominal pain/bloating. Denies CP or SOB. Denies hematuria or hematochezia. She is without any other complaints.         Pathology Review:   2022:  * * *FINAL PATHOLOGIC DIAGNOSIS* * *   1.  Right pelvic sentinel lymph node, sentinel lymph node biopsy:        One benign lymph node, no tumor seen (0/1)   2.  Left pelvic sentinel lymph node, sentinel lymph node biopsy:        Two benign lymph nodes, no tumor seen (0/2)   3.  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 (focal chondroid elements) with high grade epithelial component,   high grade serous carcinoma and minor component of clear cell carcinoma   (<5%) Vascular invasion is identified   Mild chronic cervicitis, no tumor seen   Benign endocervical polyp with adjacent detached fragments carcinosarcoma   (See comment)   Bilateral ovaries, no tumor seen   Bilateral fallopian tubes, no tumor seen   Leiomyoma uteri   ENDOMETRIUM    SPECIMEN       Procedure: Total hysterectomy and bilateral salpingo-oophorectomy   TUMOR       Histologic Type: Carcinosarcoma (malignant mixed Müllerian tumor) with   high grade serous    carcinoma, and minor component of clear cell carcinoma       Histologic Grade: High Grade       Myometrial Invasion: Cannot be determined with certainty: The sample   is received in         pieces, so definitive depth of invasion cannot be rendered. The   deepest myometrial invasion of the         intact pieces of uterus measures 4mm of 15mm       Uterine Serosa Involvement: Not identified       Cervical Stromal Involvement: Not identified       Other Tissue / Organ Involvement: Not identified       Lymphovascular Invasion: Not identified   MARGINS       Margins: Not applicable   LYMPH NODES       Lymph Node Status: All lymph nodes negative for tumor cells           Total Number of Pelvic Nodes Examined:           3           Number of Pelvic Elliott Nodes Examined:           3           Total Number of Para-aortic Nodes Examined:      0           Number of Para-aortic Elliott Nodes Examined:      0    PATHOLOGIC STAGE CLASSIFICATION (pTNM, AJCC 8th Edition)       Primary Tumor (pT):                          pT1b (based on intact   pieces of uterus)       Regional Lymph Nodes Modifier:                (sn)       Regional Lymph Nodes (pN):                     pN0   * * *Comment* * *   Due to the fragmented nature of the specimen upon receipt in the   laboratory, the presence of detached fragments of tumor on the slide   adjacent to sections of the endocervical polyp, are favored to represent   contamination, rather than true endocervical involvement. Immunohistochemical stains for pancytokeratin performed on the sentinel   lymph nodes (specimen #1(1A,1B,1F) and specimen #2(2A,2B, and 2C) are   negative for metastatic carcinoma cells. Additional immunohistochemical stains performed on a section of tumor   reveal the following staining pattern:   Estrogen Receptor: Negative   P63: Focal nuclear decoration of occasional basal epithelial cells   Desmin: Negative   Pankeratin(AE1/3): Diffuse epithelial cytoplasmic membrane decoration of   tumor cells   These findings support the diagnosis. 3/10/2022:  Specimen A: Endometrial biopsy: Features consistent with malignant mixed mesodermal tumor (carcinosarcoma) with an epithelial portion component of serous carcinoma. Imaging Review:   CT C/A/P 3/23/2022:  FINDINGS:   THYROID: No nodule. MEDIASTINUM: No mass or lymphadenopathy. KARL: No mass or lymphadenopathy. THORACIC AORTA: No dissection or aneurysm. MAIN PULMONARY ARTERY: Normal in caliber. TRACHEA/BRONCHI: Patent. ESOPHAGUS: No wall thickening or dilatation. HEART: Normal in size. PLEURA: No effusion or pneumothorax. LUNGS: No nodule, mass, or airspace disease. Incidentally noted is a 2 mm  subpleural nodule right upper lobe image 46 series 4  LIVER: No mass or biliary dilatation. There is hepatic steatosis  GALLBLADDER: There is a gallstone  SPLEEN: No mass. PANCREAS: No mass or ductal dilatation. ADRENALS: There is a 1 cm left adrenal nodule  KIDNEYS: No mass, calculus, or hydronephrosis. STOMACH: There is thickening of the body of the stomach most likely related to  incomplete distention  SMALL BOWEL: No dilatation or wall thickening. COLON: No dilatation or wall thickening. APPENDIX: Unremarkable. PERITONEUM: No ascites or pneumoperitoneum. RETROPERITONEUM: No lymphadenopathy or aortic aneurysm. REPRODUCTIVE ORGANS: The endometrium is thickened measuring up to 5.3 cm.  There  is suspicion of a 3.2 cm enhancing lesion in the anterior uterine myometrium and  a 2.8 cm enhancing lesion right uterine myometrium  URINARY BLADDER: Incompletely distended  BONES: No destructive bone lesion. There are small low densities at T1/T2  bilaterally consistent with small lateral meningocele. There is slight scoliosis  levoconvex lumbar spine with 3 mm of anterolisthesis of L4 on L5 and  degenerative changes mid to lower lumbar spine  ADDITIONAL COMMENTS: N/A  IMPRESSION  1. CT of the chest demonstrates no definite evidence of metastatic disease. 2. CT of the abdomen and pelvis demonstrates the endometrium to be thickened  measuring 5.3 cm. There is suspicion of 2 enhancing lesions in the uterine  myometrium. No adenopathy or mass is identified to suggest metastatic disease. There is a 1 cm nodule in the left adrenal gland which may represent a small  adenoma    Pelvic ultrasound 3/14/2022:  Impression: Multi-fibroid uterus. Possible fibroid versus polyp in uterine cavity. Ovaries are not visualized. ROS:  A comprehensive review of systems was negative except for that written in the History of Present Illness.  , 10 point ROS      ECOG ndGndrndanddndend:nd nd2nd Problem List:  Patient Active Problem List    Diagnosis Date Noted    Endometrial cancer ([address] 04/07/2022    Carcinosarcoma of endometrium ([address] 03/22/2022    PMB (postmenopausal bleeding)     Obesity     Malignant neoplasm of uterus ([address] 03/10/2022     PMH:  Past Medical History:   Diagnosis Date    Arthritis 2005    Enlarged uterus     Malignant neoplasm of uterus ([address] 03/10/2022    endometrial biopsy done on 3/10/22 by Dr. Elaine Schaffer PMB (postmenopausal bleeding)       PSH:  Past Surgical History:   Procedure Laterality Date    HX COLONOSCOPY      HX ORTHOPAEDIC Bilateral 2006/2017    knee replacement X2    HX OTHER SURGICAL  2016    repair of retina    HX TONSILLECTOMY      HX TUBAL LIGATION  1981      Social History:  Social History     Tobacco Use    Smoking status: Never Smoker    Smokeless tobacco: Never Used   Substance Use Topics    Alcohol use: Yes     Alcohol/week: 7.0 standard drinks     Types: 7 Glasses of wine per week      Family History:  Family History   Problem Relation Age of Onset    Cancer Father         Bladder    Anesth Problems Neg Hx       Medications: (reviewed)  Current Outpatient Medications   Medication Sig    docusate sodium (Colace) 100 mg capsule Take 1 Capsule by mouth two (2) times a day. Hold for loose stools.  vit C/Zn gluc/herbal no. 325 (ELDERBERRY ZINC VIT C MM) by Mucous Membrane route daily.  cholecalciferol, vitamin D3, (VITAMIN D3 PO) Take  by mouth.  ubidecarenone (COQ-10 PO) Take  by mouth.  KRILL OIL PO Take  by mouth.  MULTIVITAMIN PO Take  by mouth.  B.animalis,bifid,infantis,long (Probiotic 4X) 10-15 mg TbEC Take  by mouth. No current facility-administered medications for this visit. Allergies: (reviewed)  Allergies   Allergen Reactions    Pcn [Penicillins] Rash     Patient screened for any delayed non-IgE-mediated reaction to PCN.         Patient notes the following:    No delayed non-IgE-mediated reaction to PCN    ALLERGY AT 13YEARS OF AGE-NO HOSPITALIZATION             OBJECTIVE:  *deferred today given video-conference visit for ongoing COVID-19 pandemic*   Physical Exam:  VITAL SIGNS: There were no vitals filed for this visit. There is no height or weight on file to calculate BMI. GENERAL MARLENE: Conversant, alert, oriented. No acute distress. HEENT: HEENT. No thyroid enlargement. No JVD. Neck: Supple without restrictions. RESPIRATORY: Clear to auscultation and percussion to the bases. No CVAT. CARDIOVASC: RRR without murmur/rub. GASTROINT: soft, non-tender, without masses or organomegaly   MUSCULOSKEL: no joint tenderness, deformity or swelling       EXTREMITIES: extremities normal, atraumatic, no cyanosis or edema   PELVIC: Exam chaperoned by nurse. Normal appearing external genitalia.  On speculum exam, normal appearing vagina and cervix. On bimanual exam, the cervix and uterus are mobile; Uterus is 10 weeks size. No evidence of adnexal masses or nodularity. RECTAL: deferred   DOLORES SURVEY: No suspicious lymphadenopathy or edema noted. NEURO: Grossly intact. No acute deficit. Lab Date as available:    Lab Results   Component Value Date/Time    WBC 11.8 (H) 04/08/2022 05:26 AM    HGB 13.8 04/08/2022 05:26 AM    HCT 40.6 04/08/2022 05:26 AM    PLATELET 032 25/33/0517 05:26 AM    MCV 92.7 04/08/2022 05:26 AM     Lab Results   Component Value Date/Time    Sodium 136 04/08/2022 05:26 AM    Potassium 3.9 04/08/2022 05:26 AM    Chloride 107 04/08/2022 05:26 AM    CO2 23 04/08/2022 05:26 AM    Anion gap 6 04/08/2022 05:26 AM    Glucose 123 (H) 04/08/2022 05:26 AM    BUN 8 04/08/2022 05:26 AM    Creatinine 0.75 04/08/2022 05:26 AM    BUN/Creatinine ratio 11 (L) 04/08/2022 05:26 AM    GFR est AA >60 04/08/2022 05:26 AM    GFR est non-AA >60 04/08/2022 05:26 AM    Calcium 8.9 04/08/2022 05:26 AM         IMPRESSION/PLAN:    Ms. Christal Yang is a 74 y.o. 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 On 4/7/2022 underwent Robotic-assisted total laparoscopic hysterectomy, Bilateral salpingo-oophorectomy, Bilateral pelvic sentinel lymph node mapping and biopsy. 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 4mm out of 15 myometrial invasion. Negative washings. Negative SLNs. Negative CT C/A/P 3/23/2022. Problems:     Patient Active Problem List    Diagnosis Date Noted    Endometrial cancer ([facility] 04/07/2022    Carcinosarcoma of endometrium ([facility] 03/22/2022    PMB (postmenopausal bleeding)     Obesity     Malignant neoplasm of uterus ([facility] 03/10/2022     Reviewed patient's course to date, including her pathology consistent with Stage Ia carcinosarcoma of the endometrium.  Based on her pathology being consistent with carcinosarcoma, the patient is considered a high-risk patient. I have recommended adjuvant chemotherapy, carboplatin AUC 6 + paclitaxel 175mg/m2. I also discussed the role of possible VBT to follow her chemotherapy. Plan to initiate therapy in 2 weeks. Will discuss VBT further after the completion of her chemotherapy. Will set up chemo-teaching with Lukas Ma RN. All questions and concerns were addressed with the patient and she is comfortable with the plan. An electronic signature was used to authenticate this note.      Fabrizio Ames MD

## 2022-04-25 NOTE — PROGRESS NOTES
Virtual Post op Visit to discuss pathology report, surgery was on 4/7/2022    1. Have you been to the ER, urgent care clinic since your last visit? Hospitalized since your last visit? Yes, surgery with Dr. Leslie Raya on 4/7/2022    2. Have you seen or consulted any other health care providers outside of the [address] [place] [address] since your last visit? Include any pap smears or colon screening.    no

## 2022-04-26 ENCOUNTER — VIRTUAL VISIT (OUTPATIENT)
Dept: GYNECOLOGY | Age: 67
End: 2022-04-26
Payer: MEDICARE

## 2022-04-26 DIAGNOSIS — C54.1 CARCINOSARCOMA OF ENDOMETRIUM (HCC): Primary | ICD-10-CM

## 2022-04-26 PROCEDURE — 99024 POSTOP FOLLOW-UP VISIT: CPT | Performed by: OBSTETRICS & GYNECOLOGY

## 2022-04-29 ENCOUNTER — TELEPHONE (OUTPATIENT)
Dept: GYNECOLOGY | Age: 67
End: 2022-04-29

## 2022-04-29 DIAGNOSIS — C54.1 CARCINOSARCOMA OF ENDOMETRIUM (HCC): Primary | ICD-10-CM

## 2022-05-02 DIAGNOSIS — C54.1 CARCINOSARCOMA OF ENDOMETRIUM (HCC): Primary | ICD-10-CM

## 2022-05-02 RX ORDER — LORAZEPAM 2 MG/ML
0.5 INJECTION INTRAMUSCULAR
Status: CANCELLED
Start: 2022-05-16

## 2022-05-02 RX ORDER — ONDANSETRON 2 MG/ML
8 INJECTION INTRAMUSCULAR; INTRAVENOUS AS NEEDED
Status: CANCELLED | OUTPATIENT
Start: 2022-05-16

## 2022-05-02 RX ORDER — DEXAMETHASONE 4 MG/1
TABLET ORAL
Qty: 36 TABLET | Refills: 2 | Status: SHIPPED | OUTPATIENT
Start: 2022-05-02

## 2022-05-02 RX ORDER — HYDROCORTISONE SODIUM SUCCINATE 100 MG/2ML
100 INJECTION, POWDER, FOR SOLUTION INTRAMUSCULAR; INTRAVENOUS AS NEEDED
Status: CANCELLED | OUTPATIENT
Start: 2022-05-16

## 2022-05-02 RX ORDER — HEPARIN 100 UNIT/ML
300-500 SYRINGE INTRAVENOUS AS NEEDED
Status: CANCELLED
Start: 2022-05-16

## 2022-05-02 RX ORDER — ONDANSETRON 4 MG/1
4 TABLET, ORALLY DISINTEGRATING ORAL
Qty: 30 TABLET | Refills: 2 | Status: SHIPPED | OUTPATIENT
Start: 2022-05-02

## 2022-05-02 RX ORDER — PALONOSETRON 0.05 MG/ML
0.25 INJECTION, SOLUTION INTRAVENOUS ONCE
Status: CANCELLED | OUTPATIENT
Start: 2022-05-16 | End: 2022-05-10

## 2022-05-02 RX ORDER — SODIUM CHLORIDE 9 MG/ML
10 INJECTION INTRAMUSCULAR; INTRAVENOUS; SUBCUTANEOUS AS NEEDED
Status: CANCELLED | OUTPATIENT
Start: 2022-05-16

## 2022-05-02 RX ORDER — ALBUTEROL SULFATE 0.83 MG/ML
2.5 SOLUTION RESPIRATORY (INHALATION) AS NEEDED
Status: CANCELLED
Start: 2022-05-16

## 2022-05-02 RX ORDER — SODIUM CHLORIDE 0.9 % (FLUSH) 0.9 %
10 SYRINGE (ML) INJECTION AS NEEDED
Status: CANCELLED | OUTPATIENT
Start: 2022-05-16

## 2022-05-02 RX ORDER — DIPHENHYDRAMINE HYDROCHLORIDE 50 MG/ML
25 INJECTION, SOLUTION INTRAMUSCULAR; INTRAVENOUS AS NEEDED
Status: CANCELLED
Start: 2022-05-16

## 2022-05-02 RX ORDER — DIPHENHYDRAMINE HYDROCHLORIDE 50 MG/ML
50 INJECTION, SOLUTION INTRAMUSCULAR; INTRAVENOUS AS NEEDED
Status: CANCELLED
Start: 2022-05-16

## 2022-05-02 RX ORDER — DIPHENHYDRAMINE HYDROCHLORIDE 50 MG/ML
50 INJECTION, SOLUTION INTRAMUSCULAR; INTRAVENOUS ONCE
Status: CANCELLED | OUTPATIENT
Start: 2022-05-16 | End: 2022-05-10

## 2022-05-02 RX ORDER — SODIUM CHLORIDE 9 MG/ML
25 INJECTION, SOLUTION INTRAVENOUS CONTINUOUS
Status: CANCELLED | OUTPATIENT
Start: 2022-05-16

## 2022-05-02 RX ORDER — LIDOCAINE AND PRILOCAINE 25; 25 MG/G; MG/G
CREAM TOPICAL
Qty: 30 G | Refills: 3 | Status: SHIPPED | OUTPATIENT
Start: 2022-05-02

## 2022-05-02 RX ORDER — EPINEPHRINE 1 MG/ML
0.3 INJECTION, SOLUTION, CONCENTRATE INTRAVENOUS AS NEEDED
Status: CANCELLED | OUTPATIENT
Start: 2022-05-16

## 2022-05-02 RX ORDER — ACETAMINOPHEN 325 MG/1
650 TABLET ORAL AS NEEDED
Status: CANCELLED
Start: 2022-05-16

## 2022-05-02 NOTE — TELEPHONE ENCOUNTER
Requested Prescriptions     Signed Prescriptions Disp Refills    dexAMETHasone (DECADRON) 4 mg tablet 36 Tablet 2     Sig: Take 2 tablets with breakfast the day before chemo and for 2 days after chemo     Authorizing Provider: Jose Antonio Cabral     Ordering User: Lisa Brennan    ondansetron (ZOFRAN ODT) 4 mg disintegrating tablet 30 Tablet 2     Si Tablet by SubLINGual route every eight (8) hours as needed for Nausea. Indications: nausea and vomiting caused by cancer drugs     Authorizing Provider: Jose Antonio Pope User: KAILYN Tarango    lidocaine-prilocaine (EMLA) topical cream 30 g 3     Sig: Apply small amount over port area and cover with band aid one hour before chemo     Authorizing Provider: Jose Antonio Pope User: Lisa Brennan     Prescriptions sent to pharmacy per abundio Lee to be used during chemotherapy.

## 2022-05-04 ENCOUNTER — CLINICAL SUPPORT (OUTPATIENT)
Dept: GYNECOLOGY | Age: 67
End: 2022-05-04
Payer: MEDICARE

## 2022-05-04 ENCOUNTER — HOSPITAL ENCOUNTER (OUTPATIENT)
Dept: INTERVENTIONAL RADIOLOGY/VASCULAR | Age: 67
Discharge: HOME | End: 2022-05-04
Attending: STUDENT IN AN ORGANIZED HEALTH CARE EDUCATION/TRAINING PROGRAM | Admitting: STUDENT IN AN ORGANIZED HEALTH CARE EDUCATION/TRAINING PROGRAM
Payer: MEDICARE

## 2022-05-04 ENCOUNTER — TELEPHONE (OUTPATIENT)
Dept: GYNECOLOGY | Age: 67
End: 2022-05-04

## 2022-05-04 VITALS
OXYGEN SATURATION: 96 % | SYSTOLIC BLOOD PRESSURE: 115 MMHG | TEMPERATURE: 98.7 F | HEIGHT: 63 IN | HEART RATE: 68 BPM | DIASTOLIC BLOOD PRESSURE: 75 MMHG | RESPIRATION RATE: 16 BRPM | BODY MASS INDEX: 36.68 KG/M2 | WEIGHT: 207 LBS

## 2022-05-04 DIAGNOSIS — C54.1 CARCINOSARCOMA OF ENDOMETRIUM (HCC): Primary | ICD-10-CM

## 2022-05-04 DIAGNOSIS — C54.1 CARCINOSARCOMA OF ENDOMETRIUM (HCC): ICD-10-CM

## 2022-05-04 PROCEDURE — 74011250636 HC RX REV CODE- 250/636: Performed by: STUDENT IN AN ORGANIZED HEALTH CARE EDUCATION/TRAINING PROGRAM

## 2022-05-04 PROCEDURE — 77030031139 HC SUT VCRL2 J&J -A

## 2022-05-04 PROCEDURE — C1788 PORT, INDWELLING, IMP: HCPCS

## 2022-05-04 PROCEDURE — 77030011893 HC TY CUT DN TRIS -B

## 2022-05-04 PROCEDURE — 2709999900 HC NON-CHARGEABLE SUPPLY

## 2022-05-04 PROCEDURE — 74011000250 HC RX REV CODE- 250: Performed by: STUDENT IN AN ORGANIZED HEALTH CARE EDUCATION/TRAINING PROGRAM

## 2022-05-04 PROCEDURE — C1769 GUIDE WIRE: HCPCS

## 2022-05-04 PROCEDURE — 99211 OFF/OP EST MAY X REQ PHY/QHP: CPT | Performed by: OBSTETRICS & GYNECOLOGY

## 2022-05-04 PROCEDURE — 36561 INSERT TUNNELED CV CATH: CPT

## 2022-05-04 PROCEDURE — C1894 INTRO/SHEATH, NON-LASER: HCPCS

## 2022-05-04 PROCEDURE — 77030010507 HC ADH SKN DERMBND J&J -B

## 2022-05-04 RX ORDER — MIDAZOLAM HYDROCHLORIDE 1 MG/ML
.5-5 INJECTION, SOLUTION INTRAMUSCULAR; INTRAVENOUS
Status: DISCONTINUED | OUTPATIENT
Start: 2022-05-04 | End: 2022-05-04 | Stop reason: ALTCHOICE

## 2022-05-04 RX ORDER — FLUMAZENIL 0.1 MG/ML
0.5 INJECTION INTRAVENOUS
Status: DISCONTINUED | OUTPATIENT
Start: 2022-05-04 | End: 2022-05-04 | Stop reason: ALTCHOICE

## 2022-05-04 RX ORDER — FENTANYL CITRATE 50 UG/ML
25-200 INJECTION, SOLUTION INTRAMUSCULAR; INTRAVENOUS
Status: DISCONTINUED | OUTPATIENT
Start: 2022-05-04 | End: 2022-05-04 | Stop reason: ALTCHOICE

## 2022-05-04 RX ORDER — CLINDAMYCIN PHOSPHATE 900 MG/50ML
900 INJECTION INTRAVENOUS
Status: COMPLETED | OUTPATIENT
Start: 2022-05-04 | End: 2022-05-04

## 2022-05-04 RX ORDER — SODIUM CHLORIDE 9 MG/ML
25 INJECTION, SOLUTION INTRAVENOUS
Status: DISCONTINUED | OUTPATIENT
Start: 2022-05-04 | End: 2022-05-04 | Stop reason: ALTCHOICE

## 2022-05-04 RX ORDER — LIDOCAINE HYDROCHLORIDE AND EPINEPHRINE 10; 10 MG/ML; UG/ML
10 INJECTION, SOLUTION INFILTRATION; PERINEURAL
Status: COMPLETED | OUTPATIENT
Start: 2022-05-04 | End: 2022-05-04

## 2022-05-04 RX ORDER — LIDOCAINE HYDROCHLORIDE 20 MG/ML
20 INJECTION, SOLUTION INFILTRATION; PERINEURAL
Status: COMPLETED | OUTPATIENT
Start: 2022-05-04 | End: 2022-05-04

## 2022-05-04 RX ORDER — HEPARIN 100 UNIT/ML
300 SYRINGE INTRAVENOUS AS NEEDED
Status: DISCONTINUED | OUTPATIENT
Start: 2022-05-04 | End: 2022-05-04 | Stop reason: ALTCHOICE

## 2022-05-04 RX ORDER — SODIUM BICARBONATE 42 MG/ML
2 INJECTION, SOLUTION INTRAVENOUS
Status: COMPLETED | OUTPATIENT
Start: 2022-05-04 | End: 2022-05-04

## 2022-05-04 RX ORDER — NALOXONE HYDROCHLORIDE 0.4 MG/ML
0.4 INJECTION, SOLUTION INTRAMUSCULAR; INTRAVENOUS; SUBCUTANEOUS
Status: DISCONTINUED | OUTPATIENT
Start: 2022-05-04 | End: 2022-05-04 | Stop reason: ALTCHOICE

## 2022-05-04 RX ADMIN — SODIUM BICARBONATE 4 ML: 42 INJECTION, SOLUTION INTRAVENOUS at 14:05

## 2022-05-04 RX ADMIN — FENTANYL CITRATE 25 MCG: 50 INJECTION, SOLUTION INTRAMUSCULAR; INTRAVENOUS at 14:03

## 2022-05-04 RX ADMIN — Medication 500 UNITS: at 14:05

## 2022-05-04 RX ADMIN — CLINDAMYCIN PHOSPHATE 900 MG: 900 INJECTION, SOLUTION INTRAVENOUS at 13:11

## 2022-05-04 RX ADMIN — FENTANYL CITRATE 50 MCG: 50 INJECTION, SOLUTION INTRAMUSCULAR; INTRAVENOUS at 13:42

## 2022-05-04 RX ADMIN — MIDAZOLAM HYDROCHLORIDE 0.5 MG: 1 INJECTION, SOLUTION INTRAMUSCULAR; INTRAVENOUS at 14:00

## 2022-05-04 RX ADMIN — LIDOCAINE HYDROCHLORIDE 10 ML: 20 INJECTION, SOLUTION INFILTRATION; PERINEURAL at 14:09

## 2022-05-04 RX ADMIN — MIDAZOLAM HYDROCHLORIDE 2 MG: 1 INJECTION, SOLUTION INTRAMUSCULAR; INTRAVENOUS at 13:42

## 2022-05-04 RX ADMIN — FENTANYL CITRATE 25 MCG: 50 INJECTION, SOLUTION INTRAMUSCULAR; INTRAVENOUS at 13:59

## 2022-05-04 RX ADMIN — SODIUM CHLORIDE 25 ML/HR: 9 INJECTION, SOLUTION INTRAVENOUS at 13:12

## 2022-05-04 RX ADMIN — LIDOCAINE HYDROCHLORIDE,EPINEPHRINE BITARTRATE 20 ML: 10; .01 INJECTION, SOLUTION INFILTRATION; PERINEURAL at 14:09

## 2022-05-04 NOTE — DISCHARGE INSTRUCTIONS
Patient Education        Implanted Port: What to Expect at [facility]. had a procedure to implant a port. A port is a device placed, in most cases, under the skin of your chest below your collarbone. It is made of plastic, stainless steel, or titanium. It's about the size of a quarter, but thicker. It looks like a small bump under your skin. A thin, flexible tube called a catheter runs under the skin from the port into a large vein. With the port, you will be able to get medicines (such as chemotherapy) with more comfort. You also can get blood, nutrients, or other fluids. Blood can be taken through the port for tests. You will probably have some discomfort and bruising at the port site. This will go away in a few days. The port can be used right away. You may have the port for weeks, months, or longer. Your port will need to be flushed out regularly to keep it open. A nurse or other health professional will do this for you. This care sheet gives you a general idea about how long it will take for you to recover. But each person recovers at a different pace. Follow the steps below to feel better as quickly as possible. How can you care for yourself at home? Activity    · Avoid arm and upper body movements that may pull on the catheter for the first few days. These movements include heavy weight lifting and vigorous use of your arms.     · You will probably need to take 1 day off from work and will be able to return to normal activities shortly after. This depends on the type of work you do, why you have the catheter, and how you feel.     · You probably will be able to take baths and swim. But you may need to avoid some activities. Talk to your doctor about any limits on your activity.     · Ask your doctor when you can drive again. Be careful when you pull your seat belt across your chest so it doesn't pull out the catheter. It's okay if the seat belt lays over the catheter.    Medicines    · Your doctor will tell you if and when you can restart your medicines. He or she will also give you instructions about taking any new medicines.     · If you take aspirin or some other blood thinner, ask your doctor if and when to start taking it again. Make sure that you understand exactly what your doctor wants you to do.     · Be safe with medicines. Read and follow all instructions on the label. ? If the doctor gave you a prescription medicine for pain, take it as prescribed. ? If you are not taking a prescription pain medicine, ask your doctor if you can take an over-the-counter medicine. Incision care    · If you have a bandage, your doctor will tell you when you can remove it. After you remove the bandage, you may shower. Wash the area with soap and water and pat it dry. Don't use hydrogen peroxide or alcohol, which can slow healing. You may cover the area with a gauze bandage if it weeps or rubs against clothing. Change the bandage every day.     · If you have strips of tape on the cut (incision) the doctor made, leave the tape on for a week or until it falls off. Other instructions    · Always carry the medical alert card that your doctor gives you. It contains information about your port. It will tell health care workers that you have a port in case you need emergency care.     · When you get dressed, be careful not to rub the port. Do not wear a bra or suspenders that irritate your skin near the port. Follow-up care is a key part of your treatment and safety. Be sure to make and go to all appointments, and call your doctor if you are having problems. It's also a good idea to know your test results and keep a list of the medicines you take. When should you call for help? Call your doctor now or seek immediate medical care if:    · You have signs of infection, such as:  ? Increased pain, swelling, warmth, or redness. ? Red streaks leading from the area. ? Pus draining from the area. ?  A fever.     · You have pain or swelling in your neck or arm.     · You have trouble breathing. Watch closely for changes in your health, and be sure to contact your doctor if:    · You have any problems with your line or port. Side effects of sedation medications and other medications used today have been reviewed. Notify us of nausea, itching, hives, dizziness, or anything else out of the ordinary. Should you experience any of these significant changes, please call [phone] between the hours of 7:30 am and 10 pm or [phone] after hours.  After hours, ask the  to page the [address] Technologist, and describe the problem to the technologist.

## 2022-05-04 NOTE — TELEPHONE ENCOUNTER
Pt informed of office visit at [facility] with Sharri Bell from [facility] to review and be fitted for Cold Cap to be used during chemotherapy, gave pt website [email or web address]) to review procedure of prep and use of cold cap. Also recommended cold gloves and booties from [address] [facility] [address] to be used while receiving chemotherpay.

## 2022-05-04 NOTE — PROGRESS NOTES
Discharge instructions explained to [facility], all questions answered, and patient verbalized understanding. Copy of discharge instructions given to patient. Patient taken out via wheelchair Patient discharged to the care of .

## 2022-05-04 NOTE — PROGRESS NOTES
Per Dr. Tania Bermeo patient was given written materials with review for Taxol and Carboplatin. Side effects with management recomendations:     1. Nausea and vomiting ( Zofran ODT, eating small frequent meals, hydration, and to call office if unable to eat or drink with vomiting X4 over 24 hours), Eating Hints before during and after Chemotherapy given,     2. Hair loss ( ACS book with wigs/hair accessories and a 2 page list of names with address and phone numbers of where she may purchase wigs given ) . Pt wishes to persue the Dwellable Cold Cap. 3. Lab abnormalities: Neutropenia (to call office with chills and fever of 100.4), use good handwashing with soap and water frequently, stay away from anyone sick, and stay out of crowds,   4. Diarrhea/Constipation:  Advise she may use Imodium as directed, but if continues with 4 watery stools to call the office, for constipation she can use Miralax  Prescriptions for Zofran ODT 4 mg, Decadron 4 mg #36, and Emela Cream escribed to pts pharmacy. All questions answered. Office contact phone number given. Patient given schedule for MD/4 cycles chemotherapy. Consent signed and scanned into CC. Chemotherapy orders set up and sent to  for signature.

## 2022-05-04 NOTE — PROCEDURES
Interventional Radiology  Procedure Note        5/4/2022 2:24 PM    Patient: Abiel Quintero     Informed consent obtained    Diagnosis: Endometrial cancer ([facility] [address]    Procedure(s): Port placement    Specimens removed:  none    Complications: None    Primary Physician: Souleymane Almaguer MD    Recommendations: N/A    Discharge Disposition: Stable; discharge to home    Full dictated report to follow    Souleymane Almaguer MD  Interventional Radiology  [facility]  2:24 PM, 5/4/2022

## 2022-05-04 NOTE — H&P
Radiology History and Physical    Patient: Nichol Hampton 74 y.o. female       Chief Complaint: No chief complaint on file. 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 Presents for port placement. History:    Past Medical History:   Diagnosis Date    Arthritis 2005    Enlarged uterus     Malignant neoplasm of uterus ([facility] [address] 03/10/2022    endometrial biopsy done on 3/10/22 by Dr. Kayla Hodgkins PMB (postmenopausal bleeding)      Family History   Problem Relation Age of Onset    Cancer Father         Bladder    Anesth Problems Neg Hx      Social History     Socioeconomic History    Marital status:      Spouse name: Not on file    Number of children: Not on file    Years of education: Not on file    Highest education level: Not on file   Occupational History    Not on file   Tobacco Use    Smoking status: Never Smoker    Smokeless tobacco: Never Used   Vaping Use    Vaping Use: Never used   Substance and Sexual Activity    Alcohol use: Yes     Alcohol/week: 7.0 standard drinks     Types: 7 Glasses of wine per week    Drug use: Never    Sexual activity: Not on file   Other Topics Concern    Not on file   Social History Narrative    Not on file     Social Determinants of Health     Financial Resource Strain:     Difficulty of Paying Living Expenses: Not on file   Food Insecurity:     Worried About Running Out of Food in the Last Year: Not on file    Owen of Food in the Last Year: Not on file   Transportation Needs:     Lack of Transportation (Medical): Not on file    Lack of Transportation (Non-Medical):  Not on file   Physical Activity:     Days of Exercise per Week: Not on file    Minutes of Exercise per Session: Not on file   Stress:     Feeling of Stress : Not on file   Social Connections:     Frequency of Communication with Friends and Family: Not on file    Frequency of Social Gatherings with Friends and Family: Not on file  Attends Baptism Services: Not on file    Active Member of Clubs or Organizations: Not on file    Attends Club or Organization Meetings: Not on file    Marital Status: Not on file   Intimate Partner Violence:     Fear of Current or Ex-Partner: Not on file    Emotionally Abused: Not on file    Physically Abused: Not on file    Sexually Abused: Not on file   Housing Stability:     Unable to Pay for Housing in the Last Year: Not on file    Number of [place] in the Last Year: Not on file    Unstable Housing in the Last Year: Not on file       Allergies: Allergies   Allergen Reactions    Pcn [Penicillins] Rash     Patient screened for any delayed non-IgE-mediated reaction to PCN.         Patient notes the following:    No delayed non-IgE-mediated reaction to PCN    ALLERGY AT 13YEARS OF AGE-NO HOSPITALIZATION             Current Medications:  Current Facility-Administered Medications   Medication Dose Route Frequency    0.9% sodium chloride infusion  25 mL/hr IntraVENous RAD CONTINUOUS    flumazeniL (ROMAZICON) 0.1 mg/mL injection 0.5 mg  0.5 mg IntraVENous RAD PRN    fentaNYL citrate (PF) injection  mcg   mcg IntraVENous Rad Multiple    midazolam (VERSED) injection 0.5-5 mg  0.5-5 mg IntraVENous Rad Multiple    naloxone (NARCAN) injection 0.4 mg  0.4 mg IntraVENous RAD PRN    ceFAZolin (ANCEF) 2 g in sterile water (preservative free) 20 mL IV syringe  2 g IntraVENous RAD ONCE    sodium bicarbonate (4.2%) injection 84 mg  2 mL SubCUTAneous RAD ONCE    lidocaine (XYLOCAINE) 20 mg/mL (2 %) injection 400 mg  20 mL IntraDERMal RAD ONCE    lidocaine-EPINEPHrine (XYLOCAINE) 1 %-1:100,000 injection 100 mg  10 mL SubCUTAneous RAD ONCE    heparin (porcine) pf 300 Units  300 Units InterCATHeter PRN        Physical Exam:  Blood pressure (!) 149/70, pulse 60, temperature 98.7 °F (37.1 °C), resp.  rate 20, height 5' 3\" (1.6 m), weight 93.9 kg (207 lb), SpO2 96 %, not currently breastfeeding. GENERAL: alert, cooperative, no distress, appears stated age,   LUNG: Nonlabored respiration on room air  HEART: regular rate and rhythm    Alerts:    Hospital Problems  Date Reviewed: 5/4/2022    None          Laboratory:    No results for input(s): HGB, HCT, WBC, PLT, INR, BUN, CREA, K, CRCLT, HGBEXT, HCTEXT, PLTEXT, INREXT in the last 72 hours. No lab exists for component: PTT, PT      Plan of Care/Planned Procedure:  Risks, benefits, and alternatives reviewed with patient and she agrees to proceed with the procedure. Port placement    Deemed appropriate for moderate sedation with versed and fentanyl.     Loli Chong MD  Interventional Radiology  [facility]  12:45 PM, 5/4/2022

## 2022-05-10 ENCOUNTER — CLINICAL SUPPORT (OUTPATIENT)
Dept: GYNECOLOGY | Age: 67
End: 2022-05-10
Payer: MEDICARE

## 2022-05-10 ENCOUNTER — HOSPITAL ENCOUNTER (OUTPATIENT)
Dept: INFUSION THERAPY | Age: 67
End: 2022-05-10

## 2022-05-10 ENCOUNTER — TELEPHONE (OUTPATIENT)
Dept: GYNECOLOGY | Age: 67
End: 2022-05-10

## 2022-05-10 DIAGNOSIS — C54.1 CARCINOSARCOMA OF ENDOMETRIUM (HCC): Primary | ICD-10-CM

## 2022-05-10 PROCEDURE — 99024 POSTOP FOLLOW-UP VISIT: CPT | Performed by: OBSTETRICS & GYNECOLOGY

## 2022-05-10 NOTE — PROGRESS NOTES
Pt and her  in for demonstration of [place] Cold Cap with Josefa from [facility]. Pt was fitted for inside and outside cap, how to use the product was demonstrated on pt, all questions answered. [email or web address] pt application with pt signatures faxed to Hub, and pt should receive a phone call within 2 days for delivery. c1 chemotherapy scheduled for 5/16/22.

## 2022-05-12 ENCOUNTER — TELEPHONE (OUTPATIENT)
Dept: GYNECOLOGY | Age: 67
End: 2022-05-12

## 2022-05-12 NOTE — TELEPHONE ENCOUNTER
Called pt to see if [facility] cold cap [facility] has contacted her for delivery. She states they have not and will call them now. If anything is needed she will call me back.

## 2022-05-13 ENCOUNTER — TELEPHONE (OUTPATIENT)
Dept: GYNECOLOGY | Age: 67
End: 2022-05-13

## 2022-05-13 LAB
ALBUMIN SERPL-MCNC: 4.6 G/DL (ref 3.8–4.8)
ALBUMIN/GLOB SERPL: 1.9 {RATIO} (ref 1.2–2.2)
ALP SERPL-CCNC: 59 IU/L (ref 44–121)
ALT SERPL-CCNC: 19 IU/L (ref 0–32)
AST SERPL-CCNC: 16 IU/L (ref 0–40)
BASOPHILS # BLD AUTO: 0.1 X10E3/UL (ref 0–0.2)
BASOPHILS NFR BLD AUTO: 1 %
BILIRUB SERPL-MCNC: 0.4 MG/DL (ref 0–1.2)
BUN SERPL-MCNC: 11 MG/DL (ref 8–27)
BUN/CREAT SERPL: 16 (ref 12–28)
CALCIUM SERPL-MCNC: 9.8 MG/DL (ref 8.7–10.3)
CANCER AG125 SERPL-ACNC: 31.9 U/ML (ref 0–38.1)
CHLORIDE SERPL-SCNC: 102 MMOL/L (ref 96–106)
CO2 SERPL-SCNC: 24 MMOL/L (ref 20–29)
CREAT SERPL-MCNC: 0.67 MG/DL (ref 0.57–1)
EGFR: 95 ML/MIN/1.73
EOSINOPHIL # BLD AUTO: 0.2 X10E3/UL (ref 0–0.4)
EOSINOPHIL NFR BLD AUTO: 2 %
ERYTHROCYTE [DISTWIDTH] IN BLOOD BY AUTOMATED COUNT: 12.3 % (ref 11.7–15.4)
GLOBULIN SER CALC-MCNC: 2.4 G/DL (ref 1.5–4.5)
GLUCOSE SERPL-MCNC: 109 MG/DL (ref 65–99)
HCT VFR BLD AUTO: 43.9 % (ref 34–46.6)
HGB BLD-MCNC: 14.7 G/DL (ref 11.1–15.9)
IMM GRANULOCYTES # BLD AUTO: 0 X10E3/UL (ref 0–0.1)
IMM GRANULOCYTES NFR BLD AUTO: 0 %
LYMPHOCYTES # BLD AUTO: 2 X10E3/UL (ref 0.7–3.1)
LYMPHOCYTES NFR BLD AUTO: 28 %
MAGNESIUM SERPL-MCNC: 2 MG/DL (ref 1.6–2.3)
MCH RBC QN AUTO: 30.9 PG (ref 26.6–33)
MCHC RBC AUTO-ENTMCNC: 33.5 G/DL (ref 31.5–35.7)
MCV RBC AUTO: 92 FL (ref 79–97)
MONOCYTES # BLD AUTO: 0.4 X10E3/UL (ref 0.1–0.9)
MONOCYTES NFR BLD AUTO: 5 %
NEUTROPHILS # BLD AUTO: 4.6 X10E3/UL (ref 1.4–7)
NEUTROPHILS NFR BLD AUTO: 64 %
PLATELET # BLD AUTO: 182 X10E3/UL (ref 150–450)
POTASSIUM SERPL-SCNC: 4.5 MMOL/L (ref 3.5–5.2)
PROT SERPL-MCNC: 7 G/DL (ref 6–8.5)
RBC # BLD AUTO: 4.76 X10E6/UL (ref 3.77–5.28)
SODIUM SERPL-SCNC: 142 MMOL/L (ref 134–144)
WBC # BLD AUTO: 7.3 X10E3/UL (ref 3.4–10.8)

## 2022-05-13 NOTE — TELEPHONE ENCOUNTER
Pt states cold cap may not arrive in time for chemo on Monday? What are her options? Can she use the sample we have in the office?

## 2022-05-13 NOTE — TELEPHONE ENCOUNTER
Pt called back to office,she states she just spoke with someone regarding the cold cap and they are going to over night it to her. She should have some time tomorrow.   Will call Angeline Monday morning if she does not

## 2022-05-13 NOTE — TELEPHONE ENCOUNTER
I spoke with Chetan Garcia, she advised she cannot use the office sample due to hers will have a card that will need to be plugged into machine to activate her cap, pt was advised to call Angeline at 8 am Monday morning on 5/16 to let her know if her cap was delivered, if not delivered we will have to reschedule her chemo, pt verbalized understanding

## 2022-05-16 ENCOUNTER — HOSPITAL ENCOUNTER (OUTPATIENT)
Dept: INFUSION THERAPY | Age: 67
Discharge: HOME | End: 2022-05-16
Payer: MEDICARE

## 2022-05-16 VITALS
RESPIRATION RATE: 18 BRPM | HEIGHT: 63 IN | HEART RATE: 75 BPM | SYSTOLIC BLOOD PRESSURE: 153 MMHG | TEMPERATURE: 97.7 F | BODY MASS INDEX: 36.89 KG/M2 | WEIGHT: 208.2 LBS | DIASTOLIC BLOOD PRESSURE: 85 MMHG

## 2022-05-16 DIAGNOSIS — C54.1 ENDOMETRIAL CANCER (HCC): Primary | ICD-10-CM

## 2022-05-16 LAB
HBV SURFACE AB SER QL: NONREACTIVE
HBV SURFACE AB SER-ACNC: <3.1 MIU/ML
HBV SURFACE AG SER QL: <0.1 INDEX
HBV SURFACE AG SER QL: NEGATIVE

## 2022-05-16 PROCEDURE — 96413 CHEMO IV INFUSION 1 HR: CPT

## 2022-05-16 PROCEDURE — 86706 HEP B SURFACE ANTIBODY: CPT

## 2022-05-16 PROCEDURE — 74011000258 HC RX REV CODE- 258: Performed by: OBSTETRICS & GYNECOLOGY

## 2022-05-16 PROCEDURE — 74011000250 HC RX REV CODE- 250: Performed by: OBSTETRICS & GYNECOLOGY

## 2022-05-16 PROCEDURE — 86704 HEP B CORE ANTIBODY TOTAL: CPT

## 2022-05-16 PROCEDURE — 74011250636 HC RX REV CODE- 250/636: Performed by: OBSTETRICS & GYNECOLOGY

## 2022-05-16 PROCEDURE — 99213 OFFICE O/P EST LOW 20 MIN: CPT | Performed by: PHYSICIAN ASSISTANT

## 2022-05-16 PROCEDURE — 96415 CHEMO IV INFUSION ADDL HR: CPT

## 2022-05-16 PROCEDURE — 96417 CHEMO IV INFUS EACH ADDL SEQ: CPT

## 2022-05-16 PROCEDURE — 96367 TX/PROPH/DG ADDL SEQ IV INF: CPT

## 2022-05-16 PROCEDURE — 96375 TX/PRO/DX INJ NEW DRUG ADDON: CPT

## 2022-05-16 PROCEDURE — 0662T HC SCALP COOL 1ST MEAS&CALBRJ: CPT

## 2022-05-16 PROCEDURE — 87340 HEPATITIS B SURFACE AG IA: CPT

## 2022-05-16 PROCEDURE — 77030012965 HC NDL HUBR BBMI -A

## 2022-05-16 RX ORDER — HEPARIN 100 UNIT/ML
300-500 SYRINGE INTRAVENOUS AS NEEDED
Status: ACTIVE | OUTPATIENT
Start: 2022-05-16 | End: 2022-05-16

## 2022-05-16 RX ORDER — SODIUM CHLORIDE 0.9 % (FLUSH) 0.9 %
10 SYRINGE (ML) INJECTION AS NEEDED
Status: DISPENSED | OUTPATIENT
Start: 2022-05-16 | End: 2022-05-16

## 2022-05-16 RX ORDER — DIPHENHYDRAMINE HYDROCHLORIDE 50 MG/ML
50 INJECTION, SOLUTION INTRAMUSCULAR; INTRAVENOUS ONCE
Status: COMPLETED | OUTPATIENT
Start: 2022-05-16 | End: 2022-05-16

## 2022-05-16 RX ORDER — SODIUM CHLORIDE 9 MG/ML
25 INJECTION, SOLUTION INTRAVENOUS CONTINUOUS
Status: DISPENSED | OUTPATIENT
Start: 2022-05-16 | End: 2022-05-16

## 2022-05-16 RX ORDER — PALONOSETRON 0.05 MG/ML
0.25 INJECTION, SOLUTION INTRAVENOUS ONCE
Status: COMPLETED | OUTPATIENT
Start: 2022-05-16 | End: 2022-05-16

## 2022-05-16 RX ADMIN — HEPARIN 500 UNITS: 100 SYRINGE at 18:15

## 2022-05-16 RX ADMIN — CARBOPLATIN 654 MG: 10 INJECTION, SOLUTION INTRAVENOUS at 15:54

## 2022-05-16 RX ADMIN — DEXAMETHASONE SODIUM PHOSPHATE 12 MG: 4 INJECTION, SOLUTION INTRAMUSCULAR; INTRAVENOUS at 11:47

## 2022-05-16 RX ADMIN — FAMOTIDINE 20 MG: 10 INJECTION, SOLUTION INTRAVENOUS at 12:17

## 2022-05-16 RX ADMIN — SODIUM CHLORIDE 150 MG: 9 INJECTION, SOLUTION INTRAVENOUS at 11:26

## 2022-05-16 RX ADMIN — PACLITAXEL 361 MG: 6 INJECTION, SOLUTION INTRAVENOUS at 12:42

## 2022-05-16 RX ADMIN — DIPHENHYDRAMINE HYDROCHLORIDE 50 MG: 50 INJECTION, SOLUTION INTRAMUSCULAR; INTRAVENOUS at 12:20

## 2022-05-16 RX ADMIN — SODIUM CHLORIDE 25 ML/HR: 0.9 INJECTION, SOLUTION INTRAVENOUS at 11:25

## 2022-05-16 RX ADMIN — SODIUM CHLORIDE, PRESERVATIVE FREE 10 ML: 5 INJECTION INTRAVENOUS at 10:00

## 2022-05-16 RX ADMIN — PALONOSETRON HYDROCHLORIDE 0.25 MG: 0.25 INJECTION, SOLUTION INTRAVENOUS at 12:14

## 2022-05-16 NOTE — PROGRESS NOTES
[facility] [place] Chemo Progress Note    Date: May 16, 2022    Name: Saima Obrien    MRN: [id number]         : 1955    [address] Vicente Arrived to [facility] for C1 Taxol/Carboplatin ambulatory in stable condition. Assessment was completed, no acute issues at this time, no new complaints voiced. Port accessed by Blas Houston RN with positive blood return. Labs drawn and sent for processing. Chemotherapy Flowsheet 2022   Cycle C1   Date 2022   Drug / Regimen Taxol/Carboplatin   Pre Meds given   Notes given           Ms. Vicente's vitals were reviewed. Patient Vitals for the past 12 hrs:   Temp Pulse Resp BP   22 1815  75  (!) 153/85   22 0948 97.7 °F (36.5 °C) 77 18 (!) 166/73         Lab results were obtained and reviewed. Recent Results (from the past 12 hour(s))   HEP B SURFACE AG    Collection Time: 22 10:01 AM   Result Value Ref Range    Hepatitis B surface Ag <0.10 Index    Hep B surface Ag Interp. Negative NEG     HEP B SURFACE AB    Collection Time: 22 10:01 AM   Result Value Ref Range    Hepatitis B surface Ab <3.10 mIU/mL    Hep B surface Ab Interp. NONREACTIVE NR         Pre-medications  were administered as ordered and chemotherapy was initiated.   Medications Administered     0.9% sodium chloride infusion     Admin Date  2022 Action  New Bag Dose  25 mL/hr Rate  25 mL/hr Route  IntraVENous Administered By  Fidencio Rodriguez RN          CARBOplatin (PARAPLATIN) 654 mg in 0.9% sodium chloride 250 mL, overfill volume 25 mL chemo infusion (GOG)     Admin Date  2022 Action  New Bag Dose  654 mg Rate  680.8 mL/hr Route  IntraVENous Administered By  Fidencio Rodriguez RN          dexamethasone (DECADRON) 12 mg in 0.9% sodium chloride 50 mL, overfill volume 5 mL IVPB     Admin Date  2022 Action  New Bag Dose  12 mg Rate  232 mL/hr Route  IntraVENous Administered By  Fidencio Rodriguez RN          diphenhydrAMINE (BENADRYL) injection 50 mg     Admin Date  2022 Action  Given Dose  50 mg Route  IntraVENous Administered By  Mariam Wren, FRANCISCO JAVIER          famotidine (PF) (PEPCID) 20 mg in 0.9% sodium chloride 10 mL injection     Admin Date  05/16/2022 Action  Given Dose  20 mg Route  IntraVENous Administered By  Mariam Wren, FRANCISCO JAVIER          fosaprepitant (EMEND) 150 mg in 0.9% sodium chloride 150 mL IVPB     Admin Date  05/16/2022 Action  New Bag Dose  150 mg Rate  450 mL/hr Route  IntraVENous Administered By  Mariam Wren RN          heparin (porcine) pf 300-500 Units     Admin Date  05/16/2022 Action  Given Dose  500 Units Route  InterCATHeter Administered By  Mariam Wren RN          PACLitaxeL (TAXOL) 361 mg in 0.9% sodium chloride 250 mL, overfill volume 25 mL chemo infusion     Admin Date  05/16/2022 Action  New Bag Dose  361 mg Rate  111.7 mL/hr Route  IntraVENous Administered By  Mariam Wren RN          palonosetron HCl (ALOXI) injection 0.25 mg     Admin Date  05/16/2022 Action  Given Dose  0.25 mg Route  IntraVENous Administered By  Mariam Wren RN          sodium chloride (NS) flush 10 mL     Admin Date  05/16/2022 Action  Given Dose  10 mL Route  IntraVENous Administered By  [address] [facility] [address], [address] Patient tolerated treatment well. Port maintained positive blood return throughout treatment. Port flushed, heparinized and de accessed per protocol. Patient was discharged from [facility] in stable condition. Patient aware of next appointment.      Future Appointments   Date Time Provider Marques Loaiza   5/24/2022 10:45 AM MD ABBEY Suazo BS AMB   6/6/2022 10:00 AM E4 NANCY LONG [address]   6/22/2022  9:15 AM MD ABBEY Suazo AMB   6/28/2022 10:00 AM E1 NANCY LONG [address]   7/13/2022 10:00 AM MD ABBEY Suazo BS AMB   7/19/2022 10:00 AM E1 [address] [facility]   8/3/2022  1:30 PM Noah Cannon PA-C CGO BS AMB   8/9/2022 10:00 AM E1 NANCY LONG [place] [facility] 8/24/2022 10:00 AM MD JACI McadamsO BS AMB   8/30/2022 10:00 AM E[address]         Danyel Jessica, FRANCISCO JAVIER  May 16, 2022

## 2022-05-16 NOTE — PROGRESS NOTES
[facility] GYNECOLOGIC ONCOLOGY  [address] [place] [address], [address], [address]  P [phone]  F [phone]      Patient ID:  Name:  Braden Loredo  MRN:  [id number]  :  1955/66 y.o. Date:  2022      Jessica Mayorga MD: Dr. Jessica Caldera  PCP: Jim Kaur MD     Primary Diagnosis: uterine sarcoma  Date of Diagnosis: 2022      Current Agent: taxal/carboplatin  Cycle: 1      HPI:  74 y.o. 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 Possible fibroid versus polyp in uterine cavity. Ovaries are not visualized. 3/23/22 CT CAP:  1. CT of the chest demonstrates no definite evidence of metastatic disease. 2. CT of the abdomen and pelvis demonstrates the endometrium to be thickened measuring 5.3 cm. There is suspicion of 2 enhancing lesions in the uterine myometrium. No adenopathy or mass is identified to suggest metastatic disease.   There is a 1 cm nodule in the left adrenal gland which may represent a small adenoma     22 RA TLHBSO, pSLN  1.  Right pelvic sentinel lymph node, sentinel lymph node biopsy:        One benign lymph node, no tumor seen (0/1)     2.  Left pelvic sentinel lymph node, sentinel lymph node biopsy:        Two benign lymph nodes, no tumor seen (0/2)     3.  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 (focal chondroid elements) with high grade epithelial component,   high grade serous carcinoma and minor component of clear cell carcinoma   (<5%)   Vascular invasion is identified   Mild chronic cervicitis, no tumor seen   Benign endocervical polyp with adjacent detached fragments carcinosarcoma   (See comment)   Bilateral ovaries, no tumor seen   Bilateral fallopian tubes, no tumor seen   Leiomyoma uteri     ENDOMETRIUM    SPECIMEN       Procedure: Total hysterectomy and bilateral salpingo-oophorectomy       TUMOR       Histologic Type: Carcinosarcoma (malignant mixed Müllerian tumor) with   high grade serous    carcinoma, and minor component of clear cell carcinoma       Histologic Grade: High Grade       Myometrial Invasion: Cannot be determined with certainty: The sample   is received in         pieces, so definitive depth of invasion cannot be rendered. The   deepest myometrial invasion of the         intact pieces of uterus measures 4mm of 15mm       Uterine Serosa Involvement: Not identified       Cervical Stromal Involvement: Not identified       Other Tissue / Organ Involvement: Not identified       Lymphovascular Invasion: Not identified     MARGINS       Margins: Not applicable     LYMPH NODES       Lymph Node Status: All lymph nodes negative for tumor cells           Total Number of Pelvic Nodes Examined:           3           Number of Pelvic [place] Nodes Examined:           3           Total Number of Para-aortic Nodes Examined:      0           Number of Para-aortic [place] Nodes Examined:      0      PATHOLOGIC STAGE CLASSIFICATION (pTNM, AJCC 8th Edition)       Primary Tumor (pT):                          pT1b (based on intact   pieces of uterus)       Regional Lymph Nodes Modifier:                (sn)       Regional Lymph Nodes (pN):                     pN0    5/16/22 [id number]           SUBJECTIVE:  Jed Hercules presents for chemotherapy. Feeling well, recovered from surgery. No c/o. She has a cooling cap present today.  present.         ROS  Constitutional: no weight loss, fever, night sweats  Respiratory: no cough, shortness of breath, or wheezing  Cardiovascular: no chest pain or dyspnea on exertion  Heme: No abnormal bleeding  Gastrointestinal: no abdominal pain, change in bowel habits, or black or bloody stools  Genito-Urinary: no dysuria, trouble voiding, or hematuria  Musculoskeletal: negative for - joint pain or muscle pain  Neurological: negative for - dizziness, headaches or numbness/tingling  Derm: negative  Psych: negative for depression       OBJECTIVE:  Physical Exam  Visit Vitals  BP (!) 166/73 (BP 1 Location: Left upper arm, BP Patient Position: Sitting)   Pulse 77   Temp 97.7 °F (36.5 °C)   Resp 18   Ht 5' 3\" (1.6 m)   Wt 208 lb 3.2 oz (94.4 kg)   Breastfeeding No   BMI 36.88 kg/m²        General:  alert, cooperative, no distress       HEENT: without pallor, sclera without jaundice, oral mucosa without lesions,      Cardiac:  Regular rate and rhythm        Lungs:  clear to auscultation bilaterally          Port:  clean, dry, no drainage  Abdomen:  soft, non-tender, without masses or organomegaly       Lymph:  no lymphadenopathy   Extremity: no edema    Lab Results   Component Value Date/Time    WBC 7.3 05/12/2022 09:29 AM    HGB 14.7 05/12/2022 09:29 AM    HCT 43.9 05/12/2022 09:29 AM    PLATELET 562 71/63/4113 09:29 AM    MCV 92 05/12/2022 09:29 AM     Lab Results   Component Value Date/Time    ABS. NEUTROPHILS 4.6 05/12/2022 09:29 AM     Lab Results   Component Value Date/Time    Sodium 142 05/12/2022 09:29 AM    Potassium 4.5 05/12/2022 09:29 AM    Chloride 102 05/12/2022 09:29 AM    CO2 24 05/12/2022 09:29 AM    Anion gap 6 04/08/2022 05:26 AM    Glucose 109 (H) 05/12/2022 09:29 AM    BUN 11 05/12/2022 09:29 AM    Creatinine 0.67 05/12/2022 09:29 AM    BUN/Creatinine ratio 16 05/12/2022 09:29 AM    GFR est AA >60 04/08/2022 05:26 AM    GFR est non-AA >60 04/08/2022 05:26 AM    Calcium 9.8 05/12/2022 09:29 AM    Bilirubin, total 0.4 05/12/2022 09:29 AM    Alk.  phosphatase 59 05/12/2022 09:29 AM    Protein, total 7.0 05/12/2022 09:29 AM    Albumin 4.6 05/12/2022 09:29 AM Globulin 3.3 03/31/2022 02:58 PM    A-G Ratio 1.9 05/12/2022 09:29 AM    ALT (SGPT) 19 05/12/2022 09:29 AM    AST (SGOT) 16 05/12/2022 09:29 AM         No results for input(s): WBC, ANEU, HGB, HCT, MCV, MCH, PLT, HGBEXT, HCTEXT, PLTEXT, HGBEXT, HCTEXT, PLTEXT in the last 72 hours. No results for input(s): NA, K, CL, GLU, BUN, CREA, CA, MG, PHOS, ALB, TBIL, TBILI, ALT in the last 72 hours. No lab exists for component: CO3, ALBP,  AST,  ALKPHOS    Tumor markers  Cancer Ag (CA) 125   Date Value Ref Range Status   05/12/2022 31.9 0.0 - 38.1 U/mL Final     Comment:     [employer] Electrochemiluminescence Immunoassay (ECLIA)  Values obtained with different assay methods or kits cannot be  used interchangeably. Results cannot be interpreted as absolute  evidence of the presence or absence of malignant disease. 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 Santos Jeffrey PMB (postmenopausal bleeding)      Prior to Admission medications    Medication Sig Start Date End Date Taking? Authorizing Provider   dexAMETHasone (DECADRON) 4 mg tablet Take 2 tablets with breakfast the day before chemo and for 2 days after chemo  Patient not taking: Reported on 5/4/2022 5/2/22   Gagan Guidry MD   ondansetron (ZOFRAN ODT) 4 mg disintegrating tablet 1 Tablet by SubLINGual route every eight (8) hours as needed for Nausea.  Indications: nausea and vomiting caused by cancer drugs  Patient not taking: Reported on 5/4/2022 5/2/22   Gagan Guidry MD   lidocaine-prilocaine (EMLA) topical cream Apply small amount over port area and cover with band aid one hour before chemo  Patient not taking: Reported on 5/4/2022 5/2/22   Lary Nissen, MD   docusate sodium (Colace) 100 mg capsule Take 1 Capsule by mouth two (2) times a day. Hold for loose stools. Patient not taking: Reported on 5/4/2022 4/7/22   Luis Vargas PA-C   vit C/Zn gluc/herbal no. 325 (ELDERBERRY ZINC VIT C MM) by Mucous Membrane route daily. Provider, Historical   cholecalciferol, vitamin D3, (VITAMIN D3 PO) Take  by mouth. Provider, Historical   ubidecarenone (COQ-10 PO) Take  by mouth. Provider, Historical   KRILL OIL PO Take  by mouth. Provider, Historical   MULTIVITAMIN PO Take  by mouth. Provider, Historical   B.animalis,bifid,infantis,long (Probiotic 4X) 10-15 mg TbEC Take  by mouth. Provider, Historical     Allergies   Allergen Reactions    Pcn [Penicillins] Rash     Patient screened for any delayed non-IgE-mediated reaction to PCN.         Patient notes the following:    No delayed non-IgE-mediated reaction to PCN    ALLERGY AT 13YEARS OF AGE-NO HOSPITALIZATION           Family History   Problem Relation Age of Onset    Cancer Father         Bladder    Anesth Problems Neg Hx          CT Results (most recent):  Results from Hospital Encounter encounter on 03/23/22    CT ABD PELV W CONT    Narrative  EXAM:  CT CHEST W CONT, CT ABD PELV W CONT    INDICATION: Carcinosarcoma of the endometrium    COMPARISON: None    CONTRAST:  100 mL of Isovue-370. TECHNIQUE:  Following the uneventful intravenous administration of contrast, thin axial  images were obtained through the chest, abdomen and pelvis. Coronal and sagittal  reconstructions were generated. Oral contrast was administered. CT dose  reduction was achieved through use of a standardized protocol tailored for this  examination and automatic exposure control for dose modulation. FINDINGS:    THYROID: No nodule. MEDIASTINUM: No mass or lymphadenopathy. KARL: No mass or lymphadenopathy.   THORACIC AORTA: No dissection or aneurysm. MAIN PULMONARY ARTERY: Normal in caliber. TRACHEA/BRONCHI: Patent. ESOPHAGUS: No wall thickening or dilatation. HEART: Normal in size. PLEURA: No effusion or pneumothorax. LUNGS: No nodule, mass, or airspace disease. Incidentally noted is a 2 mm  subpleural nodule right upper lobe image 46 series 4  LIVER: No mass or biliary dilatation. There is hepatic steatosis  GALLBLADDER: There is a gallstone  SPLEEN: No mass. PANCREAS: No mass or ductal dilatation. ADRENALS: There is a 1 cm left adrenal nodule  KIDNEYS: No mass, calculus, or hydronephrosis. STOMACH: There is thickening of the body of the stomach most likely related to  incomplete distention  SMALL BOWEL: No dilatation or wall thickening. COLON: No dilatation or wall thickening. APPENDIX: Unremarkable. PERITONEUM: No ascites or pneumoperitoneum. RETROPERITONEUM: No lymphadenopathy or aortic aneurysm. REPRODUCTIVE ORGANS: The endometrium is thickened measuring up to 5.3 cm. There  is suspicion of a 3.2 cm enhancing lesion in the anterior uterine myometrium and  a 2.8 cm enhancing lesion right uterine myometrium  URINARY BLADDER: Incompletely distended  BONES: No destructive bone lesion. There are small low densities at T1/T2  bilaterally consistent with small lateral meningocele. There is slight scoliosis  levoconvex lumbar spine with 3 mm of anterolisthesis of L4 on L5 and  degenerative changes mid to lower lumbar spine  ADDITIONAL COMMENTS: N/A    Impression  1. CT of the chest demonstrates no definite evidence of metastatic disease. 2. CT of the abdomen and pelvis demonstrates the endometrium to be thickened  measuring 5.3 cm. There is suspicion of 2 enhancing lesions in the uterine  myometrium. No adenopathy or mass is identified to suggest metastatic disease.   There is a 1 cm nodule in the left adrenal gland which may represent a small  adenoma          IMPRESSION/PLAN:  74 y.o. with a stage Ia, uterine carcinosarcoma s/p staging. Chemotherapy taxol carboplatin today. Labs/chart reviewed and discussed with the patient. Course expectations. Recovered from surgery. Well supported with family present locally.         Ewell Schlatter, PA-C  Gyn Onc

## 2022-05-16 NOTE — TELEPHONE ENCOUNTER
Pt calling this morning to state [employer] delivered her Cold Cap at 7:30am today, and she did not see her charge card enclosed. I spoke with Bruna Adam at [facility] and they have a card to use if they can't find it. Pt informed to come into opic today as scheduled to receive C1 chemotherapy.

## 2022-05-17 ENCOUNTER — TELEPHONE (OUTPATIENT)
Dept: GYNECOLOGY | Age: 67
End: 2022-05-17

## 2022-05-17 LAB
HBV CORE AB SERPL QL IA: NEGATIVE
HBV CORE AB SERPL QL IA: NEGATIVE
HBV CORE IGM SERPL QL IA: NEGATIVE

## 2022-05-17 RX ORDER — LORAZEPAM 2 MG/ML
0.5 INJECTION INTRAMUSCULAR
Status: CANCELLED
Start: 2022-06-06

## 2022-05-17 RX ORDER — SODIUM CHLORIDE 9 MG/ML
10 INJECTION INTRAMUSCULAR; INTRAVENOUS; SUBCUTANEOUS AS NEEDED
Status: CANCELLED | OUTPATIENT
Start: 2022-06-06

## 2022-05-17 RX ORDER — EPINEPHRINE 1 MG/ML
0.3 INJECTION, SOLUTION, CONCENTRATE INTRAVENOUS AS NEEDED
Status: CANCELLED | OUTPATIENT
Start: 2022-06-06

## 2022-05-17 RX ORDER — DIPHENHYDRAMINE HYDROCHLORIDE 50 MG/ML
25 INJECTION, SOLUTION INTRAMUSCULAR; INTRAVENOUS AS NEEDED
Status: CANCELLED
Start: 2022-06-06

## 2022-05-17 RX ORDER — SODIUM CHLORIDE 0.9 % (FLUSH) 0.9 %
10 SYRINGE (ML) INJECTION AS NEEDED
Status: CANCELLED | OUTPATIENT
Start: 2022-06-06

## 2022-05-17 RX ORDER — HYDROCORTISONE SODIUM SUCCINATE 100 MG/2ML
100 INJECTION, POWDER, FOR SOLUTION INTRAMUSCULAR; INTRAVENOUS AS NEEDED
Status: CANCELLED | OUTPATIENT
Start: 2022-06-06

## 2022-05-17 RX ORDER — ONDANSETRON 2 MG/ML
8 INJECTION INTRAMUSCULAR; INTRAVENOUS AS NEEDED
Status: CANCELLED | OUTPATIENT
Start: 2022-06-06

## 2022-05-17 RX ORDER — HEPARIN 100 UNIT/ML
300-500 SYRINGE INTRAVENOUS AS NEEDED
Status: CANCELLED
Start: 2022-06-06

## 2022-05-17 RX ORDER — DIPHENHYDRAMINE HYDROCHLORIDE 50 MG/ML
50 INJECTION, SOLUTION INTRAMUSCULAR; INTRAVENOUS AS NEEDED
Status: CANCELLED
Start: 2022-06-06

## 2022-05-17 RX ORDER — DIPHENHYDRAMINE HYDROCHLORIDE 50 MG/ML
50 INJECTION, SOLUTION INTRAMUSCULAR; INTRAVENOUS ONCE
Status: CANCELLED | OUTPATIENT
Start: 2022-06-06 | End: 2022-06-06

## 2022-05-17 RX ORDER — ACETAMINOPHEN 325 MG/1
650 TABLET ORAL AS NEEDED
Status: CANCELLED
Start: 2022-06-06

## 2022-05-17 RX ORDER — PALONOSETRON 0.05 MG/ML
0.25 INJECTION, SOLUTION INTRAVENOUS ONCE
Status: CANCELLED | OUTPATIENT
Start: 2022-06-06 | End: 2022-06-06

## 2022-05-17 RX ORDER — ALBUTEROL SULFATE 0.83 MG/ML
2.5 SOLUTION RESPIRATORY (INHALATION) AS NEEDED
Status: CANCELLED
Start: 2022-06-06

## 2022-05-17 RX ORDER — SODIUM CHLORIDE 9 MG/ML
25 INJECTION, SOLUTION INTRAVENOUS CONTINUOUS
Status: CANCELLED | OUTPATIENT
Start: 2022-06-06

## 2022-05-17 NOTE — TELEPHONE ENCOUNTER
Called pt to f/u on C1 chemotherapy and use of Cold Cap for the 1st time on 5/16/22. Pt states \" everything went very well, I was very pleased and feel great this morning\". Pt encouraged to call office for any needs or concerns.

## 2022-05-19 DIAGNOSIS — C54.1 CARCINOSARCOMA OF ENDOMETRIUM (HCC): Primary | ICD-10-CM

## 2022-05-23 NOTE — PROGRESS NOTES
Post op Visit, surgery was on 4/7/2022, also pre chemo for C2 Taxol/Carbo on 6/6/622, did well with cycle one, the only thing was indigestion, GERD after day 5 of first cycle    1. Have you been to the ER, urgent care clinic since your last visit? Hospitalized since your last visit? Yes, surgery with Dr. Fausto Rogers on 4/7/2022    2. Have you seen or consulted any other health care providers outside of the [address] [place] [address] since your last visit? Include any pap smears or colon screening.    no

## 2022-05-24 ENCOUNTER — OFFICE VISIT (OUTPATIENT)
Dept: GYNECOLOGY | Age: 67
End: 2022-05-24
Payer: MEDICARE

## 2022-05-24 VITALS
HEIGHT: 63 IN | WEIGHT: 207.2 LBS | HEART RATE: 89 BPM | BODY MASS INDEX: 36.71 KG/M2 | SYSTOLIC BLOOD PRESSURE: 128 MMHG | DIASTOLIC BLOOD PRESSURE: 80 MMHG

## 2022-05-24 DIAGNOSIS — C54.1 CARCINOSARCOMA OF ENDOMETRIUM (HCC): ICD-10-CM

## 2022-05-24 DIAGNOSIS — Z79.899 ON ANTINEOPLASTIC CHEMOTHERAPY: ICD-10-CM

## 2022-05-24 DIAGNOSIS — C54.1 ENDOMETRIAL CANCER (HCC): Primary | ICD-10-CM

## 2022-05-24 PROBLEM — Z79.69 ON ANTINEOPLASTIC CHEMOTHERAPY: Status: ACTIVE | Noted: 2022-05-24

## 2022-05-24 PROCEDURE — G8400 PT W/DXA NO RESULTS DOC: HCPCS | Performed by: OBSTETRICS & GYNECOLOGY

## 2022-05-24 PROCEDURE — 99024 POSTOP FOLLOW-UP VISIT: CPT | Performed by: OBSTETRICS & GYNECOLOGY

## 2022-05-24 PROCEDURE — 1101F PT FALLS ASSESS-DOCD LE1/YR: CPT | Performed by: OBSTETRICS & GYNECOLOGY

## 2022-05-24 PROCEDURE — G8432 DEP SCR NOT DOC, RNG: HCPCS | Performed by: OBSTETRICS & GYNECOLOGY

## 2022-05-24 PROCEDURE — G8536 NO DOC ELDER MAL SCRN: HCPCS | Performed by: OBSTETRICS & GYNECOLOGY

## 2022-05-24 PROCEDURE — 1090F PRES/ABSN URINE INCON ASSESS: CPT | Performed by: OBSTETRICS & GYNECOLOGY

## 2022-05-24 PROCEDURE — G8417 CALC BMI ABV UP PARAM F/U: HCPCS | Performed by: OBSTETRICS & GYNECOLOGY

## 2022-05-24 PROCEDURE — G8427 DOCREV CUR MEDS BY ELIG CLIN: HCPCS | Performed by: OBSTETRICS & GYNECOLOGY

## 2022-05-24 PROCEDURE — 3017F COLORECTAL CA SCREEN DOC REV: CPT | Performed by: OBSTETRICS & GYNECOLOGY

## 2022-05-24 PROCEDURE — G0463 HOSPITAL OUTPT CLINIC VISIT: HCPCS | Performed by: OBSTETRICS & GYNECOLOGY

## 2022-05-24 NOTE — PROGRESS NOTES
[facility] [address], [address]  P [phone]  F [phone]    Office Note  Patient ID:  Name:  Dusty Watson  MRN:  [id number]  :  1955/66 y.o. Date:  2022      HISTORY OF PRESENT ILLNESS:  Ms. Dusty Watson is a 74 y.o. 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 On 2022 underwent Robotic-assisted total laparoscopic hysterectomy, Bilateral salpingo-oophorectomy, Bilateral pelvic sentinel lymph node mapping and biopsy. 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 4mm out of 15 myometrial invasion. Negative washings. Negative SLNs. Negative CT C/A/P 3/23/2022. Presents today for postoperative visit and further discussion. Initial History:  Ms. Dusty Watson is a 74 y.o.  postmenopausal female who presents as a new patient from Dr. Jackelin Covington for carcinosarcoma of the endometrium. The patient reported vaginal spotting/bleeding in February (). She underwent a pelvic ultrasound and endometrial biopsy with Dr. Jackelin Covington, which ultimately demonstrated carcinosarcoma. The patient denies pelvic or abdominal pain/bloating. Denies CP or SOB. Denies hematuria or hematochezia. She is without any other complaints.         Pathology Review:   2022:  * * *FINAL PATHOLOGIC DIAGNOSIS* * *   1.  Right pelvic sentinel lymph node, sentinel lymph node biopsy:        One benign lymph node, no tumor seen (0/1)   2.  Left pelvic sentinel lymph node, sentinel lymph node biopsy:        Two benign lymph nodes, no tumor seen (0/2)   3.  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 (focal chondroid elements) with high grade epithelial component,   high grade serous carcinoma and minor component of clear cell carcinoma   (<5%) Vascular invasion is identified   Mild chronic cervicitis, no tumor seen   Benign endocervical polyp with adjacent detached fragments carcinosarcoma   (See comment)   Bilateral ovaries, no tumor seen   Bilateral fallopian tubes, no tumor seen   Leiomyoma uteri   ENDOMETRIUM    SPECIMEN       Procedure: Total hysterectomy and bilateral salpingo-oophorectomy   TUMOR       Histologic Type: Carcinosarcoma (malignant mixed Müllerian tumor) with   high grade serous    carcinoma, and minor component of clear cell carcinoma       Histologic Grade: High Grade       Myometrial Invasion: Cannot be determined with certainty: The sample   is received in         pieces, so definitive depth of invasion cannot be rendered. The   deepest myometrial invasion of the         intact pieces of uterus measures 4mm of 15mm       Uterine Serosa Involvement: Not identified       Cervical Stromal Involvement: Not identified       Other Tissue / Organ Involvement: Not identified       Lymphovascular Invasion: Not identified   MARGINS       Margins: Not applicable   LYMPH NODES       Lymph Node Status: All lymph nodes negative for tumor cells           Total Number of Pelvic Nodes Examined:           3           Number of Pelvic [place] Nodes Examined:           3           Total Number of Para-aortic Nodes Examined:      0           Number of Para-aortic [place] Nodes Examined:      0    PATHOLOGIC STAGE CLASSIFICATION (pTNM, AJCC 8th Edition)       Primary Tumor (pT):                          pT1b (based on intact   pieces of uterus)       Regional Lymph Nodes Modifier:                (sn)       Regional Lymph Nodes (pN):                     pN0   * * *Comment* * *   Due to the fragmented nature of the specimen upon receipt in the   laboratory, the presence of detached fragments of tumor on the slide   adjacent to sections of the endocervical polyp, are favored to represent   contamination, rather than true endocervical involvement. Immunohistochemical stains for pancytokeratin performed on the sentinel   lymph nodes (specimen #1(1A,1B,1F) and specimen #2(2A,2B, and 2C) are   negative for metastatic carcinoma cells. Additional immunohistochemical stains performed on a section of tumor   reveal the following staining pattern:   Estrogen Receptor: Negative   P63: Focal nuclear decoration of occasional basal epithelial cells   Desmin: Negative   Pankeratin(AE1/3): Diffuse epithelial cytoplasmic membrane decoration of   tumor cells   These findings support the diagnosis. 3/10/2022:  Specimen A: Endometrial biopsy: Features consistent with malignant mixed mesodermal tumor (carcinosarcoma) with an epithelial portion component of serous carcinoma. Imaging Review:   CT C/A/P 3/23/2022:  FINDINGS:   THYROID: No nodule. MEDIASTINUM: No mass or lymphadenopathy. KARL: No mass or lymphadenopathy. THORACIC AORTA: No dissection or aneurysm. MAIN PULMONARY ARTERY: Normal in caliber. TRACHEA/BRONCHI: Patent. ESOPHAGUS: No wall thickening or dilatation. HEART: Normal in size. PLEURA: No effusion or pneumothorax. LUNGS: No nodule, mass, or airspace disease. Incidentally noted is a 2 mm  subpleural nodule right upper lobe image 46 series 4  LIVER: No mass or biliary dilatation. There is hepatic steatosis  GALLBLADDER: There is a gallstone  SPLEEN: No mass. PANCREAS: No mass or ductal dilatation. ADRENALS: There is a 1 cm left adrenal nodule  KIDNEYS: No mass, calculus, or hydronephrosis. STOMACH: There is thickening of the body of the stomach most likely related to  incomplete distention  SMALL BOWEL: No dilatation or wall thickening. COLON: No dilatation or wall thickening. APPENDIX: Unremarkable. PERITONEUM: No ascites or pneumoperitoneum. RETROPERITONEUM: No lymphadenopathy or aortic aneurysm. REPRODUCTIVE ORGANS: The endometrium is thickened measuring up to 5.3 cm.  There  is suspicion of a 3.2 cm enhancing lesion in the anterior uterine myometrium and  a 2.8 cm enhancing lesion right uterine myometrium  URINARY BLADDER: Incompletely distended  BONES: No destructive bone lesion. There are small low densities at T1/T2  bilaterally consistent with small lateral meningocele. There is slight scoliosis  levoconvex lumbar spine with 3 mm of anterolisthesis of L4 on L5 and  degenerative changes mid to lower lumbar spine  ADDITIONAL COMMENTS: N/A  IMPRESSION  1. CT of the chest demonstrates no definite evidence of metastatic disease. 2. CT of the abdomen and pelvis demonstrates the endometrium to be thickened  measuring 5.3 cm. There is suspicion of 2 enhancing lesions in the uterine  myometrium. No adenopathy or mass is identified to suggest metastatic disease. There is a 1 cm nodule in the left adrenal gland which may represent a small  adenoma    Pelvic ultrasound 3/14/2022:  Impression: Multi-fibroid uterus. Possible fibroid versus polyp in uterine cavity. Ovaries are not visualized. ROS:  A comprehensive review of systems was negative except for that written in the History of Present Illness.  , 10 point ROS      ECOG ndGndrndanddndend:nd nd2nd Problem List:  Patient Active Problem List    Diagnosis Date Noted    Endometrial cancer ([address] 04/07/2022    Carcinosarcoma of endometrium ([address] 03/22/2022    PMB (postmenopausal bleeding)     Obesity     Malignant neoplasm of uterus ([address] 03/10/2022     PMH:  Past Medical History:   Diagnosis Date    Arthritis 2005    Enlarged uterus     Malignant neoplasm of uterus ([address] 03/10/2022    endometrial biopsy done on 3/10/22 by Dr. Arlinda Nissen PMB (postmenopausal bleeding)       PSH:  Past Surgical History:   Procedure Laterality Date    HX COLONOSCOPY      HX ORTHOPAEDIC Bilateral 2006/2017    knee replacement X2    HX OTHER SURGICAL  2016    repair of retina    HX TONSILLECTOMY      HX TUBAL LIGATION  1981    IR INSERT TUNL CVC W PORT OVER 5 YEARS  5/4/2022      Social History:  Social History     Tobacco Use    Smoking status: Never Smoker    Smokeless tobacco: Never Used   Substance Use Topics    Alcohol use: Yes     Alcohol/week: 7.0 standard drinks     Types: 7 Glasses of wine per week      Family History:  Family History   Problem Relation Age of Onset    Cancer Father         Bladder    Anesth Problems Neg Hx       Medications: (reviewed)  Current Outpatient Medications   Medication Sig    dexAMETHasone (DECADRON) 4 mg tablet Take 2 tablets with breakfast the day before chemo and for 2 days after chemo (Patient not taking: Reported on 5/4/2022)    ondansetron (ZOFRAN ODT) 4 mg disintegrating tablet 1 Tablet by SubLINGual route every eight (8) hours as needed for Nausea. Indications: nausea and vomiting caused by cancer drugs (Patient not taking: Reported on 5/4/2022)    lidocaine-prilocaine (EMLA) topical cream Apply small amount over port area and cover with band aid one hour before chemo (Patient not taking: Reported on 5/4/2022)    docusate sodium (Colace) 100 mg capsule Take 1 Capsule by mouth two (2) times a day. Hold for loose stools. (Patient not taking: Reported on 5/4/2022)    vit C/Zn gluc/herbal no. 325 (ELDERBERRY ZINC VIT C MM) by Mucous Membrane route daily.  cholecalciferol, vitamin D3, (VITAMIN D3 PO) Take  by mouth.  ubidecarenone (COQ-10 PO) Take  by mouth.  KRILL OIL PO Take  by mouth.  MULTIVITAMIN PO Take  by mouth.  B.animalis,bifid,infantis,long (Probiotic 4X) 10-15 mg TbEC Take  by mouth. No current facility-administered medications for this visit. Allergies: (reviewed)  Allergies   Allergen Reactions    Pcn [Penicillins] Rash     Patient screened for any delayed non-IgE-mediated reaction to PCN.         Patient notes the following:    No delayed non-IgE-mediated reaction to PCN    ALLERGY AT 13YEARS OF AGE-NO HOSPITALIZATION             OBJECTIVE:  Physical Exam:  Visit Vitals  /80 (BP 1 Location: Left arm, BP Patient Position: Sitting)   Pulse 89   Ht 5' 3\" (1.6 m)   Wt 207 lb 3.2 oz (94 kg)   BMI 36.70 kg/m²      General: Alert and oriented. No acute distress. Well-nourished  HEENT: No thyroid enlargment. Neck supple without restrictions. Sclera normal. Normal occular motion. Moist mucous membranes. Lymphatics: No evidence of axillary, cervical, or subclavicular adenopathy. Respiratory: clear to auscultation and percussion to the bases. No CVAT. Cardiovascular: regular rate and rhythm. No murmurs, rubs, or gallops. Gastrointestinal: soft, non-tender, non-distended, no masses or organomegaly. Well-healed incision. Musculoskeletal: normal gait. No joint tenderness, deformity or swelling. No muscular tenderness. Extremities: extremities normal, atraumatic, no cyanosis or edema. Pelvic: exam chaperoned by nurse. Normal appearing external genitalia. On speculum exam, the vaginal cuff is intact and healing well. On bimanual, the uterus and cervix are surgically absent. Vaginal cuff intact without defect. No evidence of masses or nodularity on bimanual exam. Deferred rectovaginal exam.   Neuro: Grossly intact. Normal gait and movement. No acute deficit  Skin: No evidence of rashes or skin changes.      Lab Date as available:    Lab Results   Component Value Date/Time    WBC 7.3 05/12/2022 09:29 AM    HGB 14.7 05/12/2022 09:29 AM    HCT 43.9 05/12/2022 09:29 AM    PLATELET 860 16/70/5159 09:29 AM    MCV 92 05/12/2022 09:29 AM     Lab Results   Component Value Date/Time    Sodium 142 05/12/2022 09:29 AM    Potassium 4.5 05/12/2022 09:29 AM    Chloride 102 05/12/2022 09:29 AM    CO2 24 05/12/2022 09:29 AM    Anion gap 6 04/08/2022 05:26 AM    Glucose 109 (H) 05/12/2022 09:29 AM    BUN 11 05/12/2022 09:29 AM    Creatinine 0.67 05/12/2022 09:29 AM    BUN/Creatinine ratio 16 05/12/2022 09:29 AM    GFR est AA >60 04/08/2022 05:26 AM    GFR est non-AA >60 04/08/2022 05:26 AM    Calcium 9.8 05/12/2022 09:29 AM         IMPRESSION/PLAN:    Ms. Nai Christianson is a 74 y.o. 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 On 4/7/2022 underwent Robotic-assisted total laparoscopic hysterectomy, Bilateral salpingo-oophorectomy, Bilateral pelvic sentinel lymph node mapping and biopsy. 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 4mm out of 15 myometrial invasion. Negative washings. Negative SLNs. Negative CT C/A/P 3/23/2022. Initiated on adjuvant carboplatin AUC 6 + paclitaxel 175mg/m2 on 5/16/2022. Presents today for consideration of cycle 2 and postop exam.     Problems:     Patient Active Problem List    Diagnosis Date Noted    Endometrial cancer ([facility] 04/07/2022    Carcinosarcoma of endometrium ([facility] 03/22/2022    PMB (postmenopausal bleeding)     Obesity     Malignant neoplasm of uterus ([facility] 03/10/2022     Reviewed patient's course to date. Initiated on adjuvant carboplatin AUC 6 + paclitaxel 175mg/m2 on 5/16/2022. Presents today for consideration of cycle 2 and postop exam.     Per our prior discussion, based on her pathology being consistent with Stage Ia carcinosarcoma, the patient is considered a high-risk patient. I have recommended adjuvant chemotherapy, carboplatin AUC 6 + paclitaxel 175mg/m2. I also discussed the role of possible VBT to follow her chemotherapy. Will follow-up pre-chemo labs. RTC in 3 weeks for consideration of her next cycle. An electronic signature was used to authenticate this note.      Liberty Esteban MD

## 2022-06-03 LAB
ALBUMIN SERPL-MCNC: 4.2 G/DL (ref 3.8–4.8)
ALBUMIN/GLOB SERPL: 1.7 {RATIO} (ref 1.2–2.2)
ALP SERPL-CCNC: 59 IU/L (ref 44–121)
ALT SERPL-CCNC: 31 IU/L (ref 0–32)
AST SERPL-CCNC: 18 IU/L (ref 0–40)
BASOPHILS # BLD AUTO: 0.1 X10E3/UL (ref 0–0.2)
BASOPHILS NFR BLD AUTO: 1 %
BILIRUB SERPL-MCNC: 0.2 MG/DL (ref 0–1.2)
BUN SERPL-MCNC: 12 MG/DL (ref 8–27)
BUN/CREAT SERPL: 18 (ref 12–28)
CALCIUM SERPL-MCNC: 9.4 MG/DL (ref 8.7–10.3)
CANCER AG125 SERPL-ACNC: 18.9 U/ML (ref 0–38.1)
CHLORIDE SERPL-SCNC: 105 MMOL/L (ref 96–106)
CO2 SERPL-SCNC: 24 MMOL/L (ref 20–29)
CREAT SERPL-MCNC: 0.65 MG/DL (ref 0.57–1)
EGFR: 96 ML/MIN/1.73
EOSINOPHIL # BLD AUTO: 0.1 X10E3/UL (ref 0–0.4)
EOSINOPHIL NFR BLD AUTO: 2 %
ERYTHROCYTE [DISTWIDTH] IN BLOOD BY AUTOMATED COUNT: 12.5 % (ref 11.7–15.4)
GLOBULIN SER CALC-MCNC: 2.5 G/DL (ref 1.5–4.5)
GLUCOSE SERPL-MCNC: 112 MG/DL (ref 65–99)
HCT VFR BLD AUTO: 39.3 % (ref 34–46.6)
HGB BLD-MCNC: 13.2 G/DL (ref 11.1–15.9)
IMM GRANULOCYTES # BLD AUTO: 0 X10E3/UL (ref 0–0.1)
IMM GRANULOCYTES NFR BLD AUTO: 1 %
LYMPHOCYTES # BLD AUTO: 2.5 X10E3/UL (ref 0.7–3.1)
LYMPHOCYTES NFR BLD AUTO: 48 %
MAGNESIUM SERPL-MCNC: 2 MG/DL (ref 1.6–2.3)
MCH RBC QN AUTO: 31.7 PG (ref 26.6–33)
MCHC RBC AUTO-ENTMCNC: 33.6 G/DL (ref 31.5–35.7)
MCV RBC AUTO: 94 FL (ref 79–97)
MONOCYTES # BLD AUTO: 0.5 X10E3/UL (ref 0.1–0.9)
MONOCYTES NFR BLD AUTO: 9 %
NEUTROPHILS # BLD AUTO: 2 X10E3/UL (ref 1.4–7)
NEUTROPHILS NFR BLD AUTO: 39 %
PLATELET # BLD AUTO: 204 X10E3/UL (ref 150–450)
POTASSIUM SERPL-SCNC: 4.3 MMOL/L (ref 3.5–5.2)
PROT SERPL-MCNC: 6.7 G/DL (ref 6–8.5)
RBC # BLD AUTO: 4.17 X10E6/UL (ref 3.77–5.28)
SODIUM SERPL-SCNC: 144 MMOL/L (ref 134–144)
WBC # BLD AUTO: 5.2 X10E3/UL (ref 3.4–10.8)

## 2022-06-06 ENCOUNTER — HOSPITAL ENCOUNTER (OUTPATIENT)
Dept: INFUSION THERAPY | Age: 67
Discharge: HOME | End: 2022-06-06
Payer: MEDICARE

## 2022-06-06 VITALS
SYSTOLIC BLOOD PRESSURE: 136 MMHG | TEMPERATURE: 97.2 F | RESPIRATION RATE: 18 BRPM | DIASTOLIC BLOOD PRESSURE: 78 MMHG | HEART RATE: 78 BPM | BODY MASS INDEX: 37.03 KG/M2 | WEIGHT: 209 LBS | HEIGHT: 63 IN

## 2022-06-06 DIAGNOSIS — C54.1 ENDOMETRIAL CANCER (HCC): Primary | ICD-10-CM

## 2022-06-06 PROCEDURE — 96415 CHEMO IV INFUSION ADDL HR: CPT

## 2022-06-06 PROCEDURE — 74011250636 HC RX REV CODE- 250/636: Performed by: PHYSICIAN ASSISTANT

## 2022-06-06 PROCEDURE — 96375 TX/PRO/DX INJ NEW DRUG ADDON: CPT

## 2022-06-06 PROCEDURE — 74011000250 HC RX REV CODE- 250: Performed by: PHYSICIAN ASSISTANT

## 2022-06-06 PROCEDURE — 96367 TX/PROPH/DG ADDL SEQ IV INF: CPT

## 2022-06-06 PROCEDURE — 77030012965 HC NDL HUBR BBMI -A

## 2022-06-06 PROCEDURE — 96417 CHEMO IV INFUS EACH ADDL SEQ: CPT

## 2022-06-06 PROCEDURE — 74011000258 HC RX REV CODE- 258: Performed by: PHYSICIAN ASSISTANT

## 2022-06-06 PROCEDURE — 96413 CHEMO IV INFUSION 1 HR: CPT

## 2022-06-06 RX ORDER — ACETAMINOPHEN 325 MG/1
650 TABLET ORAL AS NEEDED
Status: ACTIVE | OUTPATIENT
Start: 2022-06-06 | End: 2022-06-06

## 2022-06-06 RX ORDER — SODIUM CHLORIDE 9 MG/ML
10 INJECTION INTRAMUSCULAR; INTRAVENOUS; SUBCUTANEOUS AS NEEDED
Status: ACTIVE | OUTPATIENT
Start: 2022-06-06 | End: 2022-06-06

## 2022-06-06 RX ORDER — DIPHENHYDRAMINE HYDROCHLORIDE 50 MG/ML
25 INJECTION, SOLUTION INTRAMUSCULAR; INTRAVENOUS AS NEEDED
Status: ACTIVE | OUTPATIENT
Start: 2022-06-06 | End: 2022-06-06

## 2022-06-06 RX ORDER — SODIUM CHLORIDE 0.9 % (FLUSH) 0.9 %
10 SYRINGE (ML) INJECTION AS NEEDED
Status: DISPENSED | OUTPATIENT
Start: 2022-06-06 | End: 2022-06-06

## 2022-06-06 RX ORDER — HEPARIN 100 UNIT/ML
300-500 SYRINGE INTRAVENOUS AS NEEDED
Status: ACTIVE | OUTPATIENT
Start: 2022-06-06 | End: 2022-06-06

## 2022-06-06 RX ORDER — ONDANSETRON 2 MG/ML
8 INJECTION INTRAMUSCULAR; INTRAVENOUS AS NEEDED
Status: ACTIVE | OUTPATIENT
Start: 2022-06-06 | End: 2022-06-06

## 2022-06-06 RX ORDER — DIPHENHYDRAMINE HYDROCHLORIDE 50 MG/ML
50 INJECTION, SOLUTION INTRAMUSCULAR; INTRAVENOUS ONCE
Status: COMPLETED | OUTPATIENT
Start: 2022-06-06 | End: 2022-06-06

## 2022-06-06 RX ORDER — SODIUM CHLORIDE 9 MG/ML
25 INJECTION, SOLUTION INTRAVENOUS CONTINUOUS
Status: DISPENSED | OUTPATIENT
Start: 2022-06-06 | End: 2022-06-06

## 2022-06-06 RX ORDER — PALONOSETRON 0.05 MG/ML
0.25 INJECTION, SOLUTION INTRAVENOUS ONCE
Status: COMPLETED | OUTPATIENT
Start: 2022-06-06 | End: 2022-06-06

## 2022-06-06 RX ADMIN — SODIUM CHLORIDE, PRESERVATIVE FREE 10 ML: 5 INJECTION INTRAVENOUS at 17:25

## 2022-06-06 RX ADMIN — HEPARIN 500 UNITS: 100 SYRINGE at 17:25

## 2022-06-06 RX ADMIN — SODIUM CHLORIDE 150 MG: 9 INJECTION, SOLUTION INTRAVENOUS at 11:31

## 2022-06-06 RX ADMIN — SODIUM CHLORIDE, PRESERVATIVE FREE 10 ML: 5 INJECTION INTRAVENOUS at 10:00

## 2022-06-06 RX ADMIN — SODIUM CHLORIDE, PRESERVATIVE FREE 10 ML: 5 INJECTION INTRAVENOUS at 11:19

## 2022-06-06 RX ADMIN — SODIUM CHLORIDE 25 ML/HR: 9 INJECTION, SOLUTION INTRAVENOUS at 11:20

## 2022-06-06 RX ADMIN — CARBOPLATIN 656 MG: 10 INJECTION, SOLUTION INTRAVENOUS at 16:47

## 2022-06-06 RX ADMIN — PALONOSETRON 0.25 MG: 0.05 INJECTION, SOLUTION INTRAVENOUS at 11:27

## 2022-06-06 RX ADMIN — DIPHENHYDRAMINE HYDROCHLORIDE 50 MG: 50 INJECTION, SOLUTION INTRAMUSCULAR; INTRAVENOUS at 12:34

## 2022-06-06 RX ADMIN — PACLITAXEL 361 MG: 6 INJECTION, SOLUTION INTRAVENOUS at 13:15

## 2022-06-06 RX ADMIN — FAMOTIDINE 20 MG: 10 INJECTION, SOLUTION INTRAVENOUS at 12:31

## 2022-06-06 RX ADMIN — DEXAMETHASONE SODIUM PHOSPHATE 12 MG: 4 INJECTION, SOLUTION INTRAMUSCULAR; INTRAVENOUS at 12:07

## 2022-06-06 NOTE — PROGRESS NOTES
[facility] Chemo Progress Note    Date: 2022    Name: Bety Powell    MRN: [id number]         : 1955    1000 Ms. Vicente Arrived to [facility] for C2 Taxol/Carboplatin (cold cap) ambulatory in stable condition. Assessment and port access completed by Ana Pagan RN. Chemotherapy Flowsheet 2022   Cycle C2   Date 2022   Drug / Regimen Taxol/Carboplatin   Pre Meds given   Notes given           Ms. Vicente's vitals were reviewed. Patient Vitals for the past 12 hrs:   Temp Pulse Resp BP   22 1915 -- 78 -- 136/78   22 1009 97.2 °F (36.2 °C) 78 18 (!) 146/77           Pre-medications  were administered as ordered and chemotherapy was initiated.   Medications Administered     0.9% sodium chloride infusion     Admin Date  2022 Action  New Bag Dose  25 mL/hr Rate  25 mL/hr Route  IntraVENous Administered By  Annemarie Bumpers, RN          0.9% sodium chloride injection 10 mL     Admin Date  2022 Action  Given Dose  10 mL Route  IntraVENous Administered By  Lucas Morales RN          CARBOplatin (PARAPLATIN) 656 mg in 0.9% sodium chloride 250 mL, overfill volume 25 mL chemo infusion (GOG)     Admin Date  2022 Action  New Bag Dose  656 mg Rate  681.2 mL/hr Route  IntraVENous Administered By  Annemarie Bumpers, RN          dexamethasone (DECADRON) 12 mg in 0.9% sodium chloride 50 mL, overfill volume 5 mL IVPB     Admin Date  2022 Action  New Bag Dose  12 mg Rate  232 mL/hr Route  IntraVENous Administered By  Annemarie Bumpers, RN          diphenhydrAMINE (BENADRYL) injection 50 mg     Admin Date  2022 Action  Given Dose  50 mg Route  IntraVENous Administered By  Annemarie Bumpers, RN          famotidine (PF) (PEPCID) 20 mg in 0.9% sodium chloride 10 mL injection     Admin Date  2022 Action  Given Dose  20 mg Route  IntraVENous Administered By  Annemarie Bumpers, RN          fosaprepitant (EMEND) 150 mg in 0.9% sodium chloride 150 mL IVPB     Admin Date  2022 Action  New Bag Dose  150 mg Rate  450 mL/hr Route  IntraVENous Administered By  Charlene Loredo RN          heparin (porcine) pf 300-500 Units     Admin Date  06/06/2022 Action  Given Dose  500 Units Route  InterCATHeter Administered By  Charlene Loredo RN          PACLitaxeL (TAXOL) 361 mg in 0.9% sodium chloride 250 mL, overfill volume 25 mL chemo infusion     Admin Date  06/06/2022 Action  New Bag Dose  361 mg Rate  111.7 mL/hr Route  IntraVENous Administered By  Charlene Loredo RN          palonosetron HCl (ALOXI) injection 0.25 mg     Admin Date  06/06/2022 Action  Given Dose  0.25 mg Route  IntraVENous Administered By  Charlene Loredo RN          sodium chloride (NS) flush 10 mL     Admin Date  06/06/2022 Action  Given Dose  10 mL Route  IntraVENous Administered By  Hernandez Bhakta RN           Admin Date  06/06/2022 Action  Given Dose  10 mL Route  IntraVENous Administered By  Charlene Loredo RN           Admin Date  06/06/2022 Action  Given Dose  10 mL Route  IntraVENous Administered By  Charlene Loredo RN                       [id number] Patient tolerated treatment well. Pt post cooled with cold cap for 90 min post infusion. Port maintained positive blood return throughout treatment. Port flushed, heparinized and de accessed per protocol. Patient was discharged from [facility] in stable condition. Patient aware of next appointment.      Future Appointments   Date Time Provider Marques Fadia   6/22/2022  9:15 AM MD ABBEY Bear BS AMB   6/28/2022 10:00 AM E1 NANCY JOAQUÍN [address] [place] [address]   7/13/2022 10:00 AM MD ABBEY Bear BS AMB   7/19/2022 10:00 AM E1 NANCY LONG [address] [place] [address]   8/3/2022  1:30 PM Diana Cannon PA-C CGO BS AMB   8/9/2022 10:00 AM E1 NANCY LONG [address] [place] [address]   8/24/2022 10:00 AM MD ABBEY Bear BS AMB   8/30/2022 10:00 AM E1 NANCY LONG [address] FRANCISCO JAVIER  June 6, 2022

## 2022-06-07 RX ORDER — SODIUM CHLORIDE 0.9 % (FLUSH) 0.9 %
10 SYRINGE (ML) INJECTION AS NEEDED
Status: CANCELLED | OUTPATIENT
Start: 2022-06-28

## 2022-06-07 RX ORDER — EPINEPHRINE 1 MG/ML
0.3 INJECTION, SOLUTION, CONCENTRATE INTRAVENOUS AS NEEDED
Status: CANCELLED | OUTPATIENT
Start: 2022-06-28

## 2022-06-07 RX ORDER — LORAZEPAM 2 MG/ML
0.5 INJECTION INTRAMUSCULAR
Status: CANCELLED
Start: 2022-06-28

## 2022-06-07 RX ORDER — DIPHENHYDRAMINE HYDROCHLORIDE 50 MG/ML
25 INJECTION, SOLUTION INTRAMUSCULAR; INTRAVENOUS AS NEEDED
Status: CANCELLED
Start: 2022-06-28

## 2022-06-07 RX ORDER — ONDANSETRON 2 MG/ML
8 INJECTION INTRAMUSCULAR; INTRAVENOUS AS NEEDED
Status: CANCELLED | OUTPATIENT
Start: 2022-06-28

## 2022-06-07 RX ORDER — SODIUM CHLORIDE 9 MG/ML
25 INJECTION, SOLUTION INTRAVENOUS CONTINUOUS
Status: CANCELLED | OUTPATIENT
Start: 2022-06-28

## 2022-06-07 RX ORDER — HEPARIN 100 UNIT/ML
300-500 SYRINGE INTRAVENOUS AS NEEDED
Status: CANCELLED
Start: 2022-06-28

## 2022-06-07 RX ORDER — HYDROCORTISONE SODIUM SUCCINATE 100 MG/2ML
100 INJECTION, POWDER, FOR SOLUTION INTRAMUSCULAR; INTRAVENOUS AS NEEDED
Status: CANCELLED | OUTPATIENT
Start: 2022-06-28

## 2022-06-07 RX ORDER — PALONOSETRON 0.05 MG/ML
0.25 INJECTION, SOLUTION INTRAVENOUS ONCE
Status: CANCELLED | OUTPATIENT
Start: 2022-06-28 | End: 2022-06-27

## 2022-06-07 RX ORDER — ALBUTEROL SULFATE 0.83 MG/ML
2.5 SOLUTION RESPIRATORY (INHALATION) AS NEEDED
Status: CANCELLED
Start: 2022-06-28

## 2022-06-07 RX ORDER — SODIUM CHLORIDE 9 MG/ML
10 INJECTION INTRAMUSCULAR; INTRAVENOUS; SUBCUTANEOUS AS NEEDED
Status: CANCELLED | OUTPATIENT
Start: 2022-06-28

## 2022-06-07 RX ORDER — DIPHENHYDRAMINE HYDROCHLORIDE 50 MG/ML
50 INJECTION, SOLUTION INTRAMUSCULAR; INTRAVENOUS ONCE
Status: CANCELLED | OUTPATIENT
Start: 2022-06-28 | End: 2022-06-27

## 2022-06-07 RX ORDER — ACETAMINOPHEN 325 MG/1
650 TABLET ORAL AS NEEDED
Status: CANCELLED
Start: 2022-06-28

## 2022-06-07 RX ORDER — DIPHENHYDRAMINE HYDROCHLORIDE 50 MG/ML
50 INJECTION, SOLUTION INTRAMUSCULAR; INTRAVENOUS AS NEEDED
Status: CANCELLED
Start: 2022-06-28

## 2022-06-20 DIAGNOSIS — C54.1 ENDOMETRIAL CANCER (HCC): Primary | ICD-10-CM

## 2022-06-20 NOTE — PROGRESS NOTES
Pre chemo appt, virtual visit for C3 Taxol/Carbo at [facility] on 6/28/2022    1. Have you been to the ER, urgent care clinic since your last visit? Hospitalized since your last visit?  no    2. Have you seen or consulted any other health care providers outside of the [address] [place] [address] since your last visit? Include any pap smears or colon screening.    no

## 2022-06-22 ENCOUNTER — VIRTUAL VISIT (OUTPATIENT)
Dept: GYNECOLOGY | Age: 67
End: 2022-06-22
Payer: MEDICARE

## 2022-06-22 DIAGNOSIS — Z79.899 ON ANTINEOPLASTIC CHEMOTHERAPY: ICD-10-CM

## 2022-06-22 DIAGNOSIS — C54.1 CARCINOSARCOMA OF ENDOMETRIUM (HCC): Primary | ICD-10-CM

## 2022-06-22 PROCEDURE — 3017F COLORECTAL CA SCREEN DOC REV: CPT | Performed by: OBSTETRICS & GYNECOLOGY

## 2022-06-22 PROCEDURE — G8432 DEP SCR NOT DOC, RNG: HCPCS | Performed by: OBSTETRICS & GYNECOLOGY

## 2022-06-22 PROCEDURE — G8400 PT W/DXA NO RESULTS DOC: HCPCS | Performed by: OBSTETRICS & GYNECOLOGY

## 2022-06-22 PROCEDURE — G0463 HOSPITAL OUTPT CLINIC VISIT: HCPCS | Performed by: OBSTETRICS & GYNECOLOGY

## 2022-06-22 PROCEDURE — 1090F PRES/ABSN URINE INCON ASSESS: CPT | Performed by: OBSTETRICS & GYNECOLOGY

## 2022-06-22 PROCEDURE — G8427 DOCREV CUR MEDS BY ELIG CLIN: HCPCS | Performed by: OBSTETRICS & GYNECOLOGY

## 2022-06-22 PROCEDURE — 1101F PT FALLS ASSESS-DOCD LE1/YR: CPT | Performed by: OBSTETRICS & GYNECOLOGY

## 2022-06-22 PROCEDURE — 99214 OFFICE O/P EST MOD 30 MIN: CPT | Performed by: OBSTETRICS & GYNECOLOGY

## 2022-06-22 PROCEDURE — 1123F ACP DISCUSS/DSCN MKR DOCD: CPT | Performed by: OBSTETRICS & GYNECOLOGY

## 2022-06-22 NOTE — PROGRESS NOTES
[address] [facility] [facility] [address], [address] [facility] [phone]  F [phone]    Office Note  Patient ID:  Name:  Valeria Ventura  MRN:  [id number]  :  1955/66 y.o. Date:  2022      HISTORY OF PRESENT ILLNESS:  Ms. Valeria Ventura is a 74 y.o. 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 On 2022 underwent Robotic-assisted total laparoscopic hysterectomy, Bilateral salpingo-oophorectomy, Bilateral pelvic sentinel lymph node mapping and biopsy. 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 4mm out of 15 myometrial invasion. Negative washings. Negative SLNs. Negative CT C/A/P 3/23/2022. Initiated on adjuvant carboplatin AUC 6 + paclitaxel 175mg/m2 on 2022. Presents today for consideration of cycle 3. Initial History:  Ms. Valeria Ventura is a 74 y.o.  postmenopausal female who presents as a new patient from Dr. Hardy Rice for carcinosarcoma of the endometrium. The patient reported vaginal spotting/bleeding in February (). She underwent a pelvic ultrasound and endometrial biopsy with Dr. Hardy Rice, which ultimately demonstrated carcinosarcoma. The patient denies pelvic or abdominal pain/bloating. Denies CP or SOB. Denies hematuria or hematochezia. She is without any other complaints.         Pathology Review:   2022:  * * *FINAL PATHOLOGIC DIAGNOSIS* * *   1.  Right pelvic sentinel lymph node, sentinel lymph node biopsy:        One benign lymph node, no tumor seen (0/1)   2.  Left pelvic sentinel lymph node, sentinel lymph node biopsy:        Two benign lymph nodes, no tumor seen (0/2)   3.  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 (focal chondroid elements) with high grade epithelial component,   high grade serous carcinoma and minor component of clear cell carcinoma   (<5%)   Vascular invasion is identified   Mild chronic cervicitis, no tumor seen   Benign endocervical polyp with adjacent detached fragments carcinosarcoma   (See comment)   Bilateral ovaries, no tumor seen   Bilateral fallopian tubes, no tumor seen   Leiomyoma uteri   ENDOMETRIUM    SPECIMEN       Procedure: Total hysterectomy and bilateral salpingo-oophorectomy   TUMOR       Histologic Type: Carcinosarcoma (malignant mixed Müllerian tumor) with   high grade serous    carcinoma, and minor component of clear cell carcinoma       Histologic Grade: High Grade       Myometrial Invasion: Cannot be determined with certainty: The sample   is received in         pieces, so definitive depth of invasion cannot be rendered. The   deepest myometrial invasion of the         intact pieces of uterus measures 4mm of 15mm       Uterine Serosa Involvement: Not identified       Cervical Stromal Involvement: Not identified       Other Tissue / Organ Involvement: Not identified       Lymphovascular Invasion: Not identified   MARGINS       Margins: Not applicable   LYMPH NODES       Lymph Node Status:  All lymph nodes negative for tumor cells           Total Number of Pelvic Nodes Examined:           3           Number of Pelvic [place] Nodes Examined:           3           Total Number of Para-aortic Nodes Examined:      0           Number of Para-aortic [place] Nodes Examined:      0    PATHOLOGIC STAGE CLASSIFICATION (pTNM, AJCC 8th Edition)       Primary Tumor (pT):                          pT1b (based on intact   pieces of uterus)       Regional Lymph Nodes Modifier:                (sn)       Regional Lymph Nodes (pN):                     pN0   * * *Comment* * *   Due to the fragmented nature of the specimen upon receipt in the   laboratory, the presence of detached fragments of tumor on the slide   adjacent to sections of the endocervical polyp, are favored to represent contamination, rather than true endocervical involvement. Immunohistochemical stains for pancytokeratin performed on the sentinel   lymph nodes (specimen #1(1A,1B,1F) and specimen #2(2A,2B, and 2C) are   negative for metastatic carcinoma cells. Additional immunohistochemical stains performed on a section of tumor   reveal the following staining pattern:   Estrogen Receptor: Negative   P63: Focal nuclear decoration of occasional basal epithelial cells   Desmin: Negative   Pankeratin(AE1/3): Diffuse epithelial cytoplasmic membrane decoration of   tumor cells   These findings support the diagnosis. 3/10/2022:  Specimen A: Endometrial biopsy: Features consistent with malignant mixed mesodermal tumor (carcinosarcoma) with an epithelial portion component of serous carcinoma. Imaging Review:   CT C/A/P 3/23/2022:  FINDINGS:   THYROID: No nodule. MEDIASTINUM: No mass or lymphadenopathy. KARL: No mass or lymphadenopathy. THORACIC AORTA: No dissection or aneurysm. MAIN PULMONARY ARTERY: Normal in caliber. TRACHEA/BRONCHI: Patent. ESOPHAGUS: No wall thickening or dilatation. HEART: Normal in size. PLEURA: No effusion or pneumothorax. LUNGS: No nodule, mass, or airspace disease. Incidentally noted is a 2 mm  subpleural nodule right upper lobe image 46 series 4  LIVER: No mass or biliary dilatation. There is hepatic steatosis  GALLBLADDER: There is a gallstone  SPLEEN: No mass. PANCREAS: No mass or ductal dilatation. ADRENALS: There is a 1 cm left adrenal nodule  KIDNEYS: No mass, calculus, or hydronephrosis. STOMACH: There is thickening of the body of the stomach most likely related to  incomplete distention  SMALL BOWEL: No dilatation or wall thickening. COLON: No dilatation or wall thickening. APPENDIX: Unremarkable. PERITONEUM: No ascites or pneumoperitoneum. RETROPERITONEUM: No lymphadenopathy or aortic aneurysm. REPRODUCTIVE ORGANS: The endometrium is thickened measuring up to 5.3 cm. There  is suspicion of a 3.2 cm enhancing lesion in the anterior uterine myometrium and  a 2.8 cm enhancing lesion right uterine myometrium  URINARY BLADDER: Incompletely distended  BONES: No destructive bone lesion. There are small low densities at T1/T2  bilaterally consistent with small lateral meningocele. There is slight scoliosis  levoconvex lumbar spine with 3 mm of anterolisthesis of L4 on L5 and  degenerative changes mid to lower lumbar spine  ADDITIONAL COMMENTS: N/A  IMPRESSION  1. CT of the chest demonstrates no definite evidence of metastatic disease. 2. CT of the abdomen and pelvis demonstrates the endometrium to be thickened  measuring 5.3 cm. There is suspicion of 2 enhancing lesions in the uterine  myometrium. No adenopathy or mass is identified to suggest metastatic disease. There is a 1 cm nodule in the left adrenal gland which may represent a small  adenoma    Pelvic ultrasound 3/14/2022:  Impression: Multi-fibroid uterus. Possible fibroid versus polyp in uterine cavity. Ovaries are not visualized. ROS:  A comprehensive review of systems was negative except for that written in the History of Present Illness.  , 10 point ROS      ECOG ndGndrndanddndend:nd nd2nd Problem List:  Patient Active Problem List    Diagnosis Date Noted    On antineoplastic chemotherapy 05/24/2022    Endometrial cancer ([address] 04/07/2022    Carcinosarcoma of endometrium ([address] 03/22/2022    PMB (postmenopausal bleeding)     Obesity     Malignant neoplasm of uterus ([address] 03/10/2022     PMH:  Past Medical History:   Diagnosis Date    Arthritis 2005    Enlarged uterus     Malignant neoplasm of uterus ([address] 03/10/2022    endometrial biopsy done on 3/10/22 by Dr. Kelsi Ramirez PMB (postmenopausal bleeding)       PSH:  Past Surgical History:   Procedure Laterality Date    HX COLONOSCOPY      HX ORTHOPAEDIC Bilateral 2006/2017    knee replacement X2    HX OTHER SURGICAL  2016    repair of retina    HX TONSILLECTOMY  HX TUBAL LIGATION  1981    IR INSERT TUNL CVC W PORT OVER 5 YEARS  5/4/2022      Social History:  Social History     Tobacco Use    Smoking status: Never Smoker    Smokeless tobacco: Never Used   Substance Use Topics    Alcohol use: Yes     Alcohol/week: 7.0 standard drinks     Types: 7 Glasses of wine per week      Family History:  Family History   Problem Relation Age of Onset    Cancer Father         Bladder    Anesth Problems Neg Hx       Medications: (reviewed)  Current Outpatient Medications   Medication Sig    dexAMETHasone (DECADRON) 4 mg tablet Take 2 tablets with breakfast the day before chemo and for 2 days after chemo    ondansetron (ZOFRAN ODT) 4 mg disintegrating tablet 1 Tablet by SubLINGual route every eight (8) hours as needed for Nausea. Indications: nausea and vomiting caused by cancer drugs    lidocaine-prilocaine (EMLA) topical cream Apply small amount over port area and cover with band aid one hour before chemo    vit C/Zn gluc/herbal no. 325 (ELDERBERRY ZINC VIT C MM) by Mucous Membrane route daily.  cholecalciferol, vitamin D3, (VITAMIN D3 PO) Take  by mouth.  ubidecarenone (COQ-10 PO) Take  by mouth.  KRILL OIL PO Take  by mouth.  MULTIVITAMIN PO Take  by mouth.  B.animalis,bifid,infantis,long (Probiotic 4X) 10-15 mg TbEC Take  by mouth. No current facility-administered medications for this visit. Allergies: (reviewed)  Allergies   Allergen Reactions    Pcn [Penicillins] Rash     Patient screened for any delayed non-IgE-mediated reaction to PCN.         Patient notes the following:    No delayed non-IgE-mediated reaction to PCN    ALLERGY AT 13YEARS OF AGE-NO HOSPITALIZATION             OBJECTIVE:  *deferred today given video-conference visit for ongoing COVID-19 pandemic*   Physical Exam:  There were no vitals taken for this visit. General: Alert and oriented. No acute distress. Well-nourished  HEENT: No thyroid enlargment.  Neck supple without restrictions. Sclera normal. Normal occular motion. Moist mucous membranes. Lymphatics: No evidence of axillary, cervical, or subclavicular adenopathy. Respiratory: clear to auscultation and percussion to the bases. No CVAT. Cardiovascular: regular rate and rhythm. No murmurs, rubs, or gallops. Gastrointestinal: soft, non-tender, non-distended, no masses or organomegaly. Well-healed incision. Musculoskeletal: normal gait. No joint tenderness, deformity or swelling. No muscular tenderness. Extremities: extremities normal, atraumatic, no cyanosis or edema. Pelvic: exam chaperoned by nurse. Normal appearing external genitalia. On speculum exam, the vaginal cuff is intact and healing well. On bimanual, the uterus and cervix are surgically absent. Vaginal cuff intact without defect. No evidence of masses or nodularity on bimanual exam. Deferred rectovaginal exam.   Neuro: Grossly intact. Normal gait and movement. No acute deficit  Skin: No evidence of rashes or skin changes. Lab Date as available:    Lab Results   Component Value Date/Time    WBC 5.2 06/02/2022 08:04 AM    HGB 13.2 06/02/2022 08:04 AM    HCT 39.3 06/02/2022 08:04 AM    PLATELET 236 07/46/5442 08:04 AM    MCV 94 06/02/2022 08:04 AM     Lab Results   Component Value Date/Time    Sodium 144 06/02/2022 08:04 AM    Potassium 4.3 06/02/2022 08:04 AM    Chloride 105 06/02/2022 08:04 AM    CO2 24 06/02/2022 08:04 AM    Anion gap 6 04/08/2022 05:26 AM    Glucose 112 (H) 06/02/2022 08:04 AM    BUN 12 06/02/2022 08:04 AM    Creatinine 0.65 06/02/2022 08:04 AM    BUN/Creatinine ratio 18 06/02/2022 08:04 AM    GFR est AA >60 04/08/2022 05:26 AM    GFR est non-AA >60 04/08/2022 05:26 AM    Calcium 9.4 06/02/2022 08:04 AM         IMPRESSION/PLAN:    Ms. Nichol Hampton is a 74 y.o. 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  On 4/7/2022 underwent Robotic-assisted total laparoscopic hysterectomy, Bilateral salpingo-oophorectomy, Bilateral pelvic sentinel lymph node mapping and biopsy. 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 4mm out of 15 myometrial invasion. Negative washings. Negative SLNs. Negative CT C/A/P 3/23/2022. Initiated on adjuvant carboplatin AUC 6 + paclitaxel 175mg/m2 on 5/16/2022. Presents today for consideration of cycle 3. Problems:     Patient Active Problem List    Diagnosis Date Noted    On antineoplastic chemotherapy 05/24/2022    Endometrial cancer ([facility] 04/07/2022    Carcinosarcoma of endometrium ([facility] 03/22/2022    PMB (postmenopausal bleeding)     Obesity     Malignant neoplasm of uterus ([facility] 03/10/2022     Reviewed patient's course to date. Initiated on adjuvant carboplatin AUC 6 + paclitaxel 175mg/m2 on 5/16/2022. Presents today for consideration of cycle 3. Tolerating treatment well overall. Per our prior discussion, based on her pathology being consistent with Stage Ia carcinosarcoma, the patient is considered a high-risk patient. I have recommended adjuvant chemotherapy, carboplatin AUC 6 + paclitaxel 175mg/m2. I also discussed the role of possible VBT to follow her chemotherapy. Will follow-up pre-chemo labs. RTC in 3 weeks for consideration of her next cycle. An electronic signature was used to authenticate this note. Cachorro Person MD    Pursuant to the emergency declaration unde the [facility], [facility] waiver authority and the [employer] Act, this Virtual Visit was conducted, with patient's consent, to reduce the patient's risk of exposure to COVID-19 and provide continuity of care for an established patient. Patient identification was verified at the start of the visit: Yes    Services were provided through a video synchronous discussion virtually to substitute for in-person clinic visit.  Patient was at her individual home, while the provider was in his/her respective office.     I spent at least 40 minutes with this established patient, and >50% of the time was spent counseling and/or coordinating care regarding chemotherapy    Dionte Castro MD

## 2022-06-25 LAB
ALBUMIN SERPL-MCNC: 4.2 G/DL (ref 3.8–4.8)
ALBUMIN/GLOB SERPL: 1.9 {RATIO} (ref 1.2–2.2)
ALP SERPL-CCNC: 66 IU/L (ref 44–121)
ALT SERPL-CCNC: 23 IU/L (ref 0–32)
AST SERPL-CCNC: 16 IU/L (ref 0–40)
BASOPHILS # BLD AUTO: 0 X10E3/UL (ref 0–0.2)
BASOPHILS NFR BLD AUTO: 1 %
BILIRUB SERPL-MCNC: 0.3 MG/DL (ref 0–1.2)
BUN SERPL-MCNC: 12 MG/DL (ref 8–27)
BUN/CREAT SERPL: 14 (ref 12–28)
CALCIUM SERPL-MCNC: 9.5 MG/DL (ref 8.7–10.3)
CANCER AG125 SERPL-ACNC: 19.8 U/ML (ref 0–38.1)
CHLORIDE SERPL-SCNC: 105 MMOL/L (ref 96–106)
CO2 SERPL-SCNC: 23 MMOL/L (ref 20–29)
CREAT SERPL-MCNC: 0.83 MG/DL (ref 0.57–1)
EGFR: 77 ML/MIN/1.73
EOSINOPHIL # BLD AUTO: 0.1 X10E3/UL (ref 0–0.4)
EOSINOPHIL NFR BLD AUTO: 1 %
ERYTHROCYTE [DISTWIDTH] IN BLOOD BY AUTOMATED COUNT: 13.5 % (ref 11.7–15.4)
GLOBULIN SER CALC-MCNC: 2.2 G/DL (ref 1.5–4.5)
GLUCOSE SERPL-MCNC: 120 MG/DL (ref 65–99)
HCT VFR BLD AUTO: 38.5 % (ref 34–46.6)
HGB BLD-MCNC: 13 G/DL (ref 11.1–15.9)
IMM GRANULOCYTES # BLD AUTO: 0 X10E3/UL (ref 0–0.1)
IMM GRANULOCYTES NFR BLD AUTO: 0 %
LYMPHOCYTES # BLD AUTO: 1.8 X10E3/UL (ref 0.7–3.1)
LYMPHOCYTES NFR BLD AUTO: 36 %
MAGNESIUM SERPL-MCNC: 1.8 MG/DL (ref 1.6–2.3)
MCH RBC QN AUTO: 31.5 PG (ref 26.6–33)
MCHC RBC AUTO-ENTMCNC: 33.8 G/DL (ref 31.5–35.7)
MCV RBC AUTO: 93 FL (ref 79–97)
MONOCYTES # BLD AUTO: 0.4 X10E3/UL (ref 0.1–0.9)
MONOCYTES NFR BLD AUTO: 8 %
NEUTROPHILS # BLD AUTO: 2.7 X10E3/UL (ref 1.4–7)
NEUTROPHILS NFR BLD AUTO: 54 %
PLATELET # BLD AUTO: 218 X10E3/UL (ref 150–450)
POTASSIUM SERPL-SCNC: 4.8 MMOL/L (ref 3.5–5.2)
PROT SERPL-MCNC: 6.4 G/DL (ref 6–8.5)
RBC # BLD AUTO: 4.13 X10E6/UL (ref 3.77–5.28)
SODIUM SERPL-SCNC: 145 MMOL/L (ref 134–144)
WBC # BLD AUTO: 5.1 X10E3/UL (ref 3.4–10.8)

## 2022-06-28 ENCOUNTER — HOSPITAL ENCOUNTER (OUTPATIENT)
Dept: INFUSION THERAPY | Age: 67
Discharge: HOME | End: 2022-06-28
Payer: MEDICARE

## 2022-06-28 VITALS
OXYGEN SATURATION: 95 % | TEMPERATURE: 98 F | DIASTOLIC BLOOD PRESSURE: 75 MMHG | SYSTOLIC BLOOD PRESSURE: 165 MMHG | HEIGHT: 63 IN | HEART RATE: 85 BPM | BODY MASS INDEX: 36.54 KG/M2 | WEIGHT: 206.2 LBS | RESPIRATION RATE: 18 BRPM

## 2022-06-28 DIAGNOSIS — C54.1 ENDOMETRIAL CANCER (HCC): Primary | ICD-10-CM

## 2022-06-28 PROCEDURE — 77030012965 HC NDL HUBR BBMI -A

## 2022-06-28 PROCEDURE — 74011000250 HC RX REV CODE- 250: Performed by: OBSTETRICS & GYNECOLOGY

## 2022-06-28 PROCEDURE — 74011250636 HC RX REV CODE- 250/636: Performed by: OBSTETRICS & GYNECOLOGY

## 2022-06-28 PROCEDURE — 96367 TX/PROPH/DG ADDL SEQ IV INF: CPT

## 2022-06-28 PROCEDURE — 74011000258 HC RX REV CODE- 258: Performed by: OBSTETRICS & GYNECOLOGY

## 2022-06-28 PROCEDURE — 96417 CHEMO IV INFUS EACH ADDL SEQ: CPT

## 2022-06-28 PROCEDURE — 96375 TX/PRO/DX INJ NEW DRUG ADDON: CPT

## 2022-06-28 PROCEDURE — 96413 CHEMO IV INFUSION 1 HR: CPT

## 2022-06-28 PROCEDURE — 96415 CHEMO IV INFUSION ADDL HR: CPT

## 2022-06-28 RX ORDER — SODIUM CHLORIDE 9 MG/ML
25 INJECTION, SOLUTION INTRAVENOUS CONTINUOUS
Status: DISPENSED | OUTPATIENT
Start: 2022-06-28 | End: 2022-06-28

## 2022-06-28 RX ORDER — ONDANSETRON 2 MG/ML
8 INJECTION INTRAMUSCULAR; INTRAVENOUS AS NEEDED
Status: ACTIVE | OUTPATIENT
Start: 2022-06-28 | End: 2022-06-28

## 2022-06-28 RX ORDER — SODIUM CHLORIDE 9 MG/ML
10 INJECTION INTRAMUSCULAR; INTRAVENOUS; SUBCUTANEOUS AS NEEDED
Status: ACTIVE | OUTPATIENT
Start: 2022-06-28 | End: 2022-06-28

## 2022-06-28 RX ORDER — HEPARIN 100 UNIT/ML
300-500 SYRINGE INTRAVENOUS AS NEEDED
Status: ACTIVE | OUTPATIENT
Start: 2022-06-28 | End: 2022-06-28

## 2022-06-28 RX ORDER — PALONOSETRON 0.05 MG/ML
0.25 INJECTION, SOLUTION INTRAVENOUS ONCE
Status: COMPLETED | OUTPATIENT
Start: 2022-06-28 | End: 2022-06-28

## 2022-06-28 RX ORDER — DIPHENHYDRAMINE HYDROCHLORIDE 50 MG/ML
50 INJECTION, SOLUTION INTRAMUSCULAR; INTRAVENOUS ONCE
Status: COMPLETED | OUTPATIENT
Start: 2022-06-28 | End: 2022-06-28

## 2022-06-28 RX ORDER — ACETAMINOPHEN 325 MG/1
650 TABLET ORAL AS NEEDED
Status: ACTIVE | OUTPATIENT
Start: 2022-06-28 | End: 2022-06-28

## 2022-06-28 RX ORDER — SODIUM CHLORIDE 0.9 % (FLUSH) 0.9 %
10 SYRINGE (ML) INJECTION AS NEEDED
Status: DISPENSED | OUTPATIENT
Start: 2022-06-28 | End: 2022-06-28

## 2022-06-28 RX ORDER — DIPHENHYDRAMINE HYDROCHLORIDE 50 MG/ML
25 INJECTION, SOLUTION INTRAMUSCULAR; INTRAVENOUS AS NEEDED
Status: ACTIVE | OUTPATIENT
Start: 2022-06-28 | End: 2022-06-28

## 2022-06-28 RX ADMIN — FAMOTIDINE 20 MG: 10 INJECTION, SOLUTION INTRAVENOUS at 10:25

## 2022-06-28 RX ADMIN — CARBOPLATIN 574 MG: 10 INJECTION, SOLUTION INTRAVENOUS at 14:36

## 2022-06-28 RX ADMIN — HEPARIN 500 UNITS: 100 SYRINGE at 15:17

## 2022-06-28 RX ADMIN — SODIUM CHLORIDE 150 MG: 900 INJECTION, SOLUTION INTRAVENOUS at 10:53

## 2022-06-28 RX ADMIN — SODIUM CHLORIDE 361 MG: 9 INJECTION, SOLUTION INTRAVENOUS at 11:19

## 2022-06-28 RX ADMIN — SODIUM CHLORIDE, PRESERVATIVE FREE 10 ML: 5 INJECTION INTRAVENOUS at 10:01

## 2022-06-28 RX ADMIN — DIPHENHYDRAMINE HYDROCHLORIDE 50 MG: 50 INJECTION, SOLUTION INTRAMUSCULAR; INTRAVENOUS at 10:28

## 2022-06-28 RX ADMIN — DEXAMETHASONE SODIUM PHOSPHATE 12 MG: 4 INJECTION, SOLUTION INTRAMUSCULAR; INTRAVENOUS at 10:32

## 2022-06-28 RX ADMIN — SODIUM CHLORIDE 25 ML/HR: 9 INJECTION, SOLUTION INTRAVENOUS at 10:21

## 2022-06-28 RX ADMIN — SODIUM CHLORIDE, PRESERVATIVE FREE 10 ML: 5 INJECTION INTRAVENOUS at 15:17

## 2022-06-28 RX ADMIN — PALONOSETRON 0.25 MG: 0.05 INJECTION, SOLUTION INTRAVENOUS at 10:23

## 2022-06-28 NOTE — PROGRESS NOTES
[facility] Chemo Progress Note    Date: June 28, 2022        0940: Ms. Serg Gamboa Arrived to [facility] for  C3D1 Taxol + Carboplatin (Cold Cap) ambulatory in stable condition. Assessment was completed. Port accessed with positive blood return. Labs drawn on 6/24/22, reviewed today. Criteria for treatment was met. Chemotherapy Flowsheet 6/28/2022   Cycle C3   Date 6/28/2022   Drug / Regimen Taxol/Carboplatin   Pre Meds Given   Notes Given       Ms. Vicente's vitals were reviewed. Patient Vitals for the past 12 hrs:   Temp Pulse Resp BP SpO2   06/28/22 1636 -- 85 18 (!) 165/75 --   06/28/22 0940 98 °F (36.7 °C) 87 18 (!) 143/86 95 %       Pre-medications  were administered as ordered and chemotherapy was initiated.   Medications Administered     0.9% sodium chloride infusion     Admin Date  06/28/2022 Action  New Bag Dose  25 mL/hr Rate  25 mL/hr Route  IntraVENous Administered By  Masoud Little          0.9% sodium chloride injection 10 mL     Admin Date  06/28/2022 Action  Given Dose  10 mL Route  IntraVENous Administered By  Jeremias Juan M Date  06/28/2022 Action  Given Dose  10 mL Route  IntraVENous Administered By  Masoud Little          CARBOplatin (PARAPLATIN) 574 mg in 0.9% sodium chloride 250 mL, overfill volume 25 mL chemo infusion (GOG)     Admin Date  06/28/2022 Action  New Bag Dose  574 mg Rate  664.8 mL/hr Route  IntraVENous Administered By  Masoud Little          dexamethasone (DECADRON) 12 mg in 0.9% sodium chloride 50 mL, overfill volume 5 mL IVPB     Admin Date  06/28/2022 Action  New Bag Dose  12 mg Rate  232 mL/hr Route  IntraVENous Administered By  Masoud Little          diphenhydrAMINE (BENADRYL) injection 50 mg     Admin Date  06/28/2022 Action  Given Dose  50 mg Route  IntraVENous Administered By  Masoud Little          famotidine (PF) (PEPCID) 20 mg in 0.9% sodium chloride 10 mL injection     Admin Date  06/28/2022 Action  Given Dose  20 mg Route  IntraVENous Administered By  [place]          fosaprepitant (EMEND) 150 mg in 0.9% sodium chloride 150 mL IVPB     Admin Date  06/28/2022 Action  New Bag Dose  150 mg Rate  450 mL/hr Route  IntraVENous Administered By  [place]          heparin (porcine) pf 300-500 Units     Admin Date  06/28/2022 Action  Given Dose  500 Units Route  InterCATHeter Administered By  [place]          PACLitaxeL (TAXOL) 361 mg in 0.9% sodium chloride 250 mL, overfill volume 25 mL chemo infusion     Admin Date  06/28/2022 Action  New Bag Dose  361 mg Rate  111.7 mL/hr Route  IntraVENous Administered By  [place]          palonosetron HCl (ALOXI) injection 0.25 mg     Admin Date  06/28/2022 Action  Given Dose  0.25 mg Route  IntraVENous Administered By  [place]                Two nurses verified prior to administering: Drug name, Drug dose, Infusion volume or drug volume when prepared in a syringe, Rate of administration, Route of administration, Expiration dates and/or times, Appearance and physical integrity of the drugs, Rate set on infusion pump, when used, and Sequencing of drug administration. 1650: Patient tolerated treatment well. Port maintained positive blood return throughout treatment. Port flushed, heparinized and de accessed per protocol. Patient was discharged in stable condition. Patient is aware of next scheduled [facility] appointment on 7/19/22.     Future Appointments   Date Time Provider Marques Loaiza   7/13/2022 10:00 AM MD ABBEY Gonzalez BS [facility]   7/19/2022 10:00 AM E1 [address]   7/27/2022  2:00 PM MD ABBEY Gonzalez BS AMB   8/9/2022 10:00 AM E1 [address]   8/24/2022 10:00 AM MD ABBEY Gonzalez BS AMB   8/30/2022 10:00 AM E1 [address]         Nika Mack RN  June 28, 2022

## 2022-06-30 RX ORDER — PALONOSETRON 0.05 MG/ML
0.25 INJECTION, SOLUTION INTRAVENOUS ONCE
Status: CANCELLED | OUTPATIENT
Start: 2022-07-19 | End: 2022-07-19

## 2022-06-30 RX ORDER — SODIUM CHLORIDE 9 MG/ML
10 INJECTION INTRAMUSCULAR; INTRAVENOUS; SUBCUTANEOUS AS NEEDED
Status: CANCELLED | OUTPATIENT
Start: 2022-07-19

## 2022-06-30 RX ORDER — ONDANSETRON 2 MG/ML
8 INJECTION INTRAMUSCULAR; INTRAVENOUS AS NEEDED
Status: CANCELLED | OUTPATIENT
Start: 2022-07-19

## 2022-06-30 RX ORDER — ALBUTEROL SULFATE 0.83 MG/ML
2.5 SOLUTION RESPIRATORY (INHALATION) AS NEEDED
Status: CANCELLED
Start: 2022-07-19

## 2022-06-30 RX ORDER — DIPHENHYDRAMINE HYDROCHLORIDE 50 MG/ML
50 INJECTION, SOLUTION INTRAMUSCULAR; INTRAVENOUS ONCE
Status: CANCELLED | OUTPATIENT
Start: 2022-07-19 | End: 2022-07-19

## 2022-06-30 RX ORDER — ACETAMINOPHEN 325 MG/1
650 TABLET ORAL AS NEEDED
Status: CANCELLED
Start: 2022-07-19

## 2022-06-30 RX ORDER — HYDROCORTISONE SODIUM SUCCINATE 100 MG/2ML
100 INJECTION, POWDER, FOR SOLUTION INTRAMUSCULAR; INTRAVENOUS AS NEEDED
Status: CANCELLED | OUTPATIENT
Start: 2022-07-19

## 2022-06-30 RX ORDER — DIPHENHYDRAMINE HYDROCHLORIDE 50 MG/ML
50 INJECTION, SOLUTION INTRAMUSCULAR; INTRAVENOUS AS NEEDED
Status: CANCELLED
Start: 2022-07-19

## 2022-06-30 RX ORDER — SODIUM CHLORIDE 9 MG/ML
25 INJECTION, SOLUTION INTRAVENOUS CONTINUOUS
Status: CANCELLED | OUTPATIENT
Start: 2022-07-19

## 2022-06-30 RX ORDER — HEPARIN 100 UNIT/ML
300-500 SYRINGE INTRAVENOUS AS NEEDED
Status: CANCELLED
Start: 2022-07-19

## 2022-06-30 RX ORDER — EPINEPHRINE 1 MG/ML
0.3 INJECTION, SOLUTION, CONCENTRATE INTRAVENOUS AS NEEDED
Status: CANCELLED | OUTPATIENT
Start: 2022-07-19

## 2022-06-30 RX ORDER — DIPHENHYDRAMINE HYDROCHLORIDE 50 MG/ML
25 INJECTION, SOLUTION INTRAMUSCULAR; INTRAVENOUS AS NEEDED
Status: CANCELLED
Start: 2022-07-19

## 2022-06-30 RX ORDER — SODIUM CHLORIDE 0.9 % (FLUSH) 0.9 %
10 SYRINGE (ML) INJECTION AS NEEDED
Status: CANCELLED | OUTPATIENT
Start: 2022-07-19

## 2022-06-30 RX ORDER — LORAZEPAM 2 MG/ML
0.5 INJECTION INTRAMUSCULAR
Status: CANCELLED
Start: 2022-07-19

## 2022-07-12 DIAGNOSIS — C54.1 ENDOMETRIAL CANCER (HCC): Primary | ICD-10-CM

## 2022-07-12 NOTE — PROGRESS NOTES
Pre chemo visit for Ronda Raymond at [facility] on 7/19/22, lab slip given to patient to have labs drawn on 7/15/22    1. Have you been to the ER, urgent care clinic since your last visit? Hospitalized since your last visit?  no    2. Have you seen or consulted any other health care providers outside of the [address] [place] [address] since your last visit? Include any pap smears or colon screening.    no

## 2022-07-13 ENCOUNTER — OFFICE VISIT (OUTPATIENT)
Dept: GYNECOLOGY | Age: 67
End: 2022-07-13
Payer: MEDICARE

## 2022-07-13 VITALS
DIASTOLIC BLOOD PRESSURE: 78 MMHG | BODY MASS INDEX: 36.32 KG/M2 | HEIGHT: 63 IN | SYSTOLIC BLOOD PRESSURE: 122 MMHG | WEIGHT: 205 LBS | HEART RATE: 88 BPM

## 2022-07-13 DIAGNOSIS — C54.1 CARCINOSARCOMA OF ENDOMETRIUM (HCC): Primary | ICD-10-CM

## 2022-07-13 DIAGNOSIS — Z79.899 ON ANTINEOPLASTIC CHEMOTHERAPY: ICD-10-CM

## 2022-07-13 PROCEDURE — 1101F PT FALLS ASSESS-DOCD LE1/YR: CPT | Performed by: OBSTETRICS & GYNECOLOGY

## 2022-07-13 PROCEDURE — 1090F PRES/ABSN URINE INCON ASSESS: CPT | Performed by: OBSTETRICS & GYNECOLOGY

## 2022-07-13 PROCEDURE — G8417 CALC BMI ABV UP PARAM F/U: HCPCS | Performed by: OBSTETRICS & GYNECOLOGY

## 2022-07-13 PROCEDURE — G0463 HOSPITAL OUTPT CLINIC VISIT: HCPCS | Performed by: OBSTETRICS & GYNECOLOGY

## 2022-07-13 PROCEDURE — 1123F ACP DISCUSS/DSCN MKR DOCD: CPT | Performed by: OBSTETRICS & GYNECOLOGY

## 2022-07-13 PROCEDURE — G8427 DOCREV CUR MEDS BY ELIG CLIN: HCPCS | Performed by: OBSTETRICS & GYNECOLOGY

## 2022-07-13 PROCEDURE — G8432 DEP SCR NOT DOC, RNG: HCPCS | Performed by: OBSTETRICS & GYNECOLOGY

## 2022-07-13 PROCEDURE — G8400 PT W/DXA NO RESULTS DOC: HCPCS | Performed by: OBSTETRICS & GYNECOLOGY

## 2022-07-13 PROCEDURE — 3017F COLORECTAL CA SCREEN DOC REV: CPT | Performed by: OBSTETRICS & GYNECOLOGY

## 2022-07-13 PROCEDURE — 99214 OFFICE O/P EST MOD 30 MIN: CPT | Performed by: OBSTETRICS & GYNECOLOGY

## 2022-07-13 PROCEDURE — G8536 NO DOC ELDER MAL SCRN: HCPCS | Performed by: OBSTETRICS & GYNECOLOGY

## 2022-07-13 NOTE — PROGRESS NOTES
27 [facility] Mathias Mor[address], [address]  P [phone]  F [phone]    Office Note  Patient ID:  Name:  Mick Montoya  MRN:  [id number]  :  1955/66 y.o. Date:  2022      HISTORY OF PRESENT ILLNESS:  Ms. Mick Montoya is a 74 y.o. 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 On 2022 underwent Robotic-assisted total laparoscopic hysterectomy, Bilateral salpingo-oophorectomy, Bilateral pelvic sentinel lymph node mapping and biopsy. 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 4mm out of 15 myometrial invasion. Negative washings. Negative SLNs. Negative CT C/A/P 3/23/2022. Initiated on adjuvant carboplatin AUC 6 + paclitaxel 175mg/m2 on 2022. Presents today for consideration of cycle 4. Tolerated treatment well overall. Initial History:  Ms. Mick Montoya is a 74 y.o.  postmenopausal female who presents as a new patient from Dr. Cody Delaney for carcinosarcoma of the endometrium. The patient reported vaginal spotting/bleeding in February (). She underwent a pelvic ultrasound and endometrial biopsy with Dr. Cody Delaney, which ultimately demonstrated carcinosarcoma. The patient denies pelvic or abdominal pain/bloating. Denies CP or SOB. Denies hematuria or hematochezia. She is without any other complaints.         Pathology Review:   2022:  * * *FINAL PATHOLOGIC DIAGNOSIS* * *   1.  Right pelvic sentinel lymph node, sentinel lymph node biopsy:        One benign lymph node, no tumor seen (0/1)   2.  Left pelvic sentinel lymph node, sentinel lymph node biopsy:        Two benign lymph nodes, no tumor seen (0/2)   3.  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 (focal chondroid elements) with high grade epithelial component,   high grade serous carcinoma and minor component of clear cell carcinoma   (<5%)   Vascular invasion is identified   Mild chronic cervicitis, no tumor seen   Benign endocervical polyp with adjacent detached fragments carcinosarcoma   (See comment)   Bilateral ovaries, no tumor seen   Bilateral fallopian tubes, no tumor seen   Leiomyoma uteri   ENDOMETRIUM    SPECIMEN       Procedure: Total hysterectomy and bilateral salpingo-oophorectomy   TUMOR       Histologic Type: Carcinosarcoma (malignant mixed Müllerian tumor) with   high grade serous    carcinoma, and minor component of clear cell carcinoma       Histologic Grade: High Grade       Myometrial Invasion: Cannot be determined with certainty: The sample   is received in         pieces, so definitive depth of invasion cannot be rendered. The   deepest myometrial invasion of the         intact pieces of uterus measures 4mm of 15mm       Uterine Serosa Involvement: Not identified       Cervical Stromal Involvement: Not identified       Other Tissue / Organ Involvement: Not identified       Lymphovascular Invasion: Not identified   MARGINS       Margins: Not applicable   LYMPH NODES       Lymph Node Status:  All lymph nodes negative for tumor cells           Total Number of Pelvic Nodes Examined:           3           Number of Pelvic Cerrillos Nodes Examined:           3           Total Number of Para-aortic Nodes Examined:      0           Number of Para-aortic Cerrillos Nodes Examined:      0    PATHOLOGIC STAGE CLASSIFICATION (pTNM, AJCC 8th Edition)       Primary Tumor (pT):                          pT1b (based on intact   pieces of uterus)       Regional Lymph Nodes Modifier:                (sn)       Regional Lymph Nodes (pN):                     pN0   * * *Comment* * *   Due to the fragmented nature of the specimen upon receipt in the   laboratory, the presence of detached fragments of tumor on the slide   adjacent to sections of the endocervical polyp, are favored to represent   contamination, rather than true endocervical involvement. Immunohistochemical stains for pancytokeratin performed on the sentinel   lymph nodes (specimen #1(1A,1B,1F) and specimen #2(2A,2B, and 2C) are   negative for metastatic carcinoma cells. Additional immunohistochemical stains performed on a section of tumor   reveal the following staining pattern:   Estrogen Receptor: Negative   P63: Focal nuclear decoration of occasional basal epithelial cells   Desmin: Negative   Pankeratin(AE1/3): Diffuse epithelial cytoplasmic membrane decoration of   tumor cells   These findings support the diagnosis. 3/10/2022:  Specimen A: Endometrial biopsy: Features consistent with malignant mixed mesodermal tumor (carcinosarcoma) with an epithelial portion component of serous carcinoma. Imaging Review:   CT C/A/P 3/23/2022:  FINDINGS:   THYROID: No nodule. MEDIASTINUM: No mass or lymphadenopathy. KARL: No mass or lymphadenopathy. THORACIC AORTA: No dissection or aneurysm. MAIN PULMONARY ARTERY: Normal in caliber. TRACHEA/BRONCHI: Patent. ESOPHAGUS: No wall thickening or dilatation. HEART: Normal in size. PLEURA: No effusion or pneumothorax. LUNGS: No nodule, mass, or airspace disease. Incidentally noted is a 2 mm  subpleural nodule right upper lobe image 46 series 4  LIVER: No mass or biliary dilatation. There is hepatic steatosis  GALLBLADDER: There is a gallstone  SPLEEN: No mass. PANCREAS: No mass or ductal dilatation. ADRENALS: There is a 1 cm left adrenal nodule  KIDNEYS: No mass, calculus, or hydronephrosis. STOMACH: There is thickening of the body of the stomach most likely related to  incomplete distention  SMALL BOWEL: No dilatation or wall thickening. COLON: No dilatation or wall thickening. APPENDIX: Unremarkable. PERITONEUM: No ascites or pneumoperitoneum. RETROPERITONEUM: No lymphadenopathy or aortic aneurysm.   REPRODUCTIVE ORGANS: The endometrium is thickened measuring up to 5.3 cm. There  is suspicion of a 3.2 cm enhancing lesion in the anterior uterine myometrium and  a 2.8 cm enhancing lesion right uterine myometrium  URINARY BLADDER: Incompletely distended  BONES: No destructive bone lesion. There are small low densities at T1/T2  bilaterally consistent with small lateral meningocele. There is slight scoliosis  levoconvex lumbar spine with 3 mm of anterolisthesis of L4 on L5 and  degenerative changes mid to lower lumbar spine  ADDITIONAL COMMENTS: N/A  IMPRESSION  1. CT of the chest demonstrates no definite evidence of metastatic disease. 2. CT of the abdomen and pelvis demonstrates the endometrium to be thickened  measuring 5.3 cm. There is suspicion of 2 enhancing lesions in the uterine  myometrium. No adenopathy or mass is identified to suggest metastatic disease. There is a 1 cm nodule in the left adrenal gland which may represent a small  adenoma    Pelvic ultrasound 3/14/2022:  Impression: Multi-fibroid uterus. Possible fibroid versus polyp in uterine cavity. Ovaries are not visualized. ROS:  A comprehensive review of systems was negative except for that written in the History of Present Illness.  , 10 point ROS      ECOG ndGndrndanddndend:nd nd2nd Problem List:  Patient Active Problem List    Diagnosis Date Noted    On antineoplastic chemotherapy 05/24/2022    Endometrial cancer ([address] 04/07/2022    Carcinosarcoma of endometrium ([address] 03/22/2022    PMB (postmenopausal bleeding)     Obesity     Malignant neoplasm of uterus ([address] 03/10/2022     PMH:  Past Medical History:   Diagnosis Date    Arthritis 2005    Enlarged uterus     Malignant neoplasm of uterus ([address] 03/10/2022    endometrial biopsy done on 3/10/22 by Dr. Molina Brochure PMB (postmenopausal bleeding)       PSH:  Past Surgical History:   Procedure Laterality Date    HX COLONOSCOPY      HX ORTHOPAEDIC Bilateral 2006/2017    knee replacement X2    HX OTHER SURGICAL  2016    repair of retina    HX TONSILLECTOMY      HX TUBAL LIGATION  1981    IR INSERT TUNL CVC W PORT OVER 5 YEARS  5/4/2022      Social History:  Social History     Tobacco Use    Smoking status: Never Smoker    Smokeless tobacco: Never Used   Substance Use Topics    Alcohol use: Yes     Alcohol/week: 7.0 standard drinks     Types: 7 Glasses of wine per week      Family History:  Family History   Problem Relation Age of Onset    Cancer Father         Bladder    Anesth Problems Neg Hx       Medications: (reviewed)  Current Outpatient Medications   Medication Sig    dexAMETHasone (DECADRON) 4 mg tablet Take 2 tablets with breakfast the day before chemo and for 2 days after chemo    ondansetron (ZOFRAN ODT) 4 mg disintegrating tablet 1 Tablet by SubLINGual route every eight (8) hours as needed for Nausea. Indications: nausea and vomiting caused by cancer drugs    lidocaine-prilocaine (EMLA) topical cream Apply small amount over port area and cover with band aid one hour before chemo    vit C/Zn gluc/herbal no. 325 (ELDERBERRY ZINC VIT C MM) by Mucous Membrane route daily.  cholecalciferol, vitamin D3, (VITAMIN D3 PO) Take  by mouth.  ubidecarenone (COQ-10 PO) Take  by mouth.  KRILL OIL PO Take  by mouth.  MULTIVITAMIN PO Take  by mouth.  B.animalis,bifid,infantis,long (Probiotic 4X) 10-15 mg TbEC Take  by mouth. No current facility-administered medications for this visit. Allergies: (reviewed)  Allergies   Allergen Reactions    Pcn [Penicillins] Rash     Patient screened for any delayed non-IgE-mediated reaction to PCN.         Patient notes the following:    No delayed non-IgE-mediated reaction to PCN    ALLERGY AT 13YEARS OF AGE-NO HOSPITALIZATION             OBJECTIVE:    Physical Exam:  There were no vitals taken for this visit. General: Alert and oriented. No acute distress. Well-nourished  HEENT: No thyroid enlargment. Neck supple without restrictions. Sclera normal. Normal occular motion. Moist mucous membranes. Lymphatics: No evidence of axillary, cervical, or subclavicular adenopathy. Respiratory: clear to auscultation and percussion to the bases. No CVAT. Cardiovascular: regular rate and rhythm. No murmurs, rubs, or gallops. Gastrointestinal: soft, non-tender, non-distended, no masses or organomegaly. Well-healed incision. Musculoskeletal: normal gait. No joint tenderness, deformity or swelling. No muscular tenderness. Extremities: extremities normal, atraumatic, no cyanosis or edema. Pelvic: exam chaperoned by nurse. Normal appearing external genitalia. On speculum exam, the vaginal cuff is intact and healing well. On bimanual, the uterus and cervix are surgically absent. Vaginal cuff intact without defect. No evidence of masses or nodularity on bimanual exam. Deferred rectovaginal exam.   Neuro: Grossly intact. Normal gait and movement. No acute deficit  Skin: No evidence of rashes or skin changes. Lab Date as available:    Lab Results   Component Value Date/Time    WBC 5.1 06/24/2022 08:49 AM    HGB 13.0 06/24/2022 08:49 AM    HCT 38.5 06/24/2022 08:49 AM    PLATELET 259 99/95/5633 08:49 AM    MCV 93 06/24/2022 08:49 AM     Lab Results   Component Value Date/Time    Sodium 145 (H) 06/24/2022 08:49 AM    Potassium 4.8 06/24/2022 08:49 AM    Chloride 105 06/24/2022 08:49 AM    CO2 23 06/24/2022 08:49 AM    Anion gap 6 04/08/2022 05:26 AM    Glucose 120 (H) 06/24/2022 08:49 AM    BUN 12 06/24/2022 08:49 AM    Creatinine 0.83 06/24/2022 08:49 AM    BUN/Creatinine ratio 14 06/24/2022 08:49 AM    GFR est AA >60 04/08/2022 05:26 AM    GFR est non-AA >60 04/08/2022 05:26 AM    Calcium 9.5 06/24/2022 08:49 AM         IMPRESSION/PLAN:    Ms. Fe Barger is a 74 y.o. 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  On 4/7/2022 underwent Robotic-assisted total laparoscopic hysterectomy, Bilateral salpingo-oophorectomy, Bilateral pelvic sentinel lymph node mapping and biopsy. 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 4mm out of 15 myometrial invasion. Negative washings. Negative SLNs. Negative CT C/A/P 3/23/2022. Initiated on adjuvant carboplatin AUC 6 + paclitaxel 175mg/m2 on 5/16/2022. Presents today for consideration of cycle 4. Problems:     Patient Active Problem List    Diagnosis Date Noted    On antineoplastic chemotherapy 05/24/2022    Endometrial cancer ([facility] 04/07/2022    Carcinosarcoma of endometrium ([facility] 03/22/2022    PMB (postmenopausal bleeding)     Obesity     Malignant neoplasm of uterus ([facility] 03/10/2022     Reviewed patient's course to date. Initiated on adjuvant carboplatin AUC 6 + paclitaxel 175mg/m2 on 5/16/2022. Presents today for consideration of cycle 4. Tolerating treatment well overall. Per our prior discussion, based on her pathology being consistent with Stage Ia carcinosarcoma, the patient is considered a high-risk patient. I have recommended adjuvant chemotherapy, carboplatin AUC 6 + paclitaxel 175mg/m2, 6 cycles. I also discussed the role of possible VBT to follow her chemotherapy. Will follow-up pre-chemo labs. RTC in 3 weeks for consideration of her next cycle. An electronic signature was used to authenticate this note.      Jihan Miller MD

## 2022-07-16 LAB
ALBUMIN SERPL-MCNC: 3.9 G/DL (ref 3.8–4.8)
ALBUMIN/GLOB SERPL: 1.6 {RATIO} (ref 1.2–2.2)
ALP SERPL-CCNC: 63 IU/L (ref 44–121)
ALT SERPL-CCNC: 17 IU/L (ref 0–32)
AST SERPL-CCNC: 12 IU/L (ref 0–40)
BASOPHILS # BLD AUTO: 0 X10E3/UL (ref 0–0.2)
BASOPHILS NFR BLD AUTO: 0 %
BILIRUB SERPL-MCNC: 0.3 MG/DL (ref 0–1.2)
BUN SERPL-MCNC: 8 MG/DL (ref 8–27)
BUN/CREAT SERPL: 12 (ref 12–28)
CALCIUM SERPL-MCNC: 9.3 MG/DL (ref 8.7–10.3)
CANCER AG125 SERPL-ACNC: 19 U/ML (ref 0–38.1)
CHLORIDE SERPL-SCNC: 105 MMOL/L (ref 96–106)
CO2 SERPL-SCNC: 25 MMOL/L (ref 20–29)
CREAT SERPL-MCNC: 0.68 MG/DL (ref 0.57–1)
EGFR: 95 ML/MIN/1.73
EOSINOPHIL # BLD AUTO: 0.1 X10E3/UL (ref 0–0.4)
EOSINOPHIL NFR BLD AUTO: 2 %
ERYTHROCYTE [DISTWIDTH] IN BLOOD BY AUTOMATED COUNT: 14.2 % (ref 11.7–15.4)
GLOBULIN SER CALC-MCNC: 2.4 G/DL (ref 1.5–4.5)
GLUCOSE SERPL-MCNC: 111 MG/DL (ref 65–99)
HCT VFR BLD AUTO: 33.4 % (ref 34–46.6)
HGB BLD-MCNC: 11.4 G/DL (ref 11.1–15.9)
IMM GRANULOCYTES # BLD AUTO: 0 X10E3/UL (ref 0–0.1)
IMM GRANULOCYTES NFR BLD AUTO: 1 %
LYMPHOCYTES # BLD AUTO: 1.4 X10E3/UL (ref 0.7–3.1)
LYMPHOCYTES NFR BLD AUTO: 36 %
MAGNESIUM SERPL-MCNC: 1.6 MG/DL (ref 1.6–2.3)
MCH RBC QN AUTO: 32.3 PG (ref 26.6–33)
MCHC RBC AUTO-ENTMCNC: 34.1 G/DL (ref 31.5–35.7)
MCV RBC AUTO: 95 FL (ref 79–97)
MONOCYTES # BLD AUTO: 0.4 X10E3/UL (ref 0.1–0.9)
MONOCYTES NFR BLD AUTO: 11 %
NEUTROPHILS # BLD AUTO: 2 X10E3/UL (ref 1.4–7)
NEUTROPHILS NFR BLD AUTO: 50 %
PLATELET # BLD AUTO: 169 X10E3/UL (ref 150–450)
POTASSIUM SERPL-SCNC: 4.2 MMOL/L (ref 3.5–5.2)
PROT SERPL-MCNC: 6.3 G/DL (ref 6–8.5)
RBC # BLD AUTO: 3.53 X10E6/UL (ref 3.77–5.28)
SODIUM SERPL-SCNC: 145 MMOL/L (ref 134–144)
WBC # BLD AUTO: 3.9 X10E3/UL (ref 3.4–10.8)

## 2022-07-19 ENCOUNTER — HOSPITAL ENCOUNTER (OUTPATIENT)
Dept: INFUSION THERAPY | Age: 67
Discharge: HOME | End: 2022-07-19
Payer: MEDICARE

## 2022-07-19 VITALS
SYSTOLIC BLOOD PRESSURE: 142 MMHG | BODY MASS INDEX: 36.43 KG/M2 | WEIGHT: 205.6 LBS | TEMPERATURE: 96.5 F | HEIGHT: 63 IN | DIASTOLIC BLOOD PRESSURE: 79 MMHG | HEART RATE: 60 BPM | RESPIRATION RATE: 18 BRPM

## 2022-07-19 DIAGNOSIS — C54.1 ENDOMETRIAL CANCER (HCC): Primary | ICD-10-CM

## 2022-07-19 PROCEDURE — 74011000258 HC RX REV CODE- 258: Performed by: OBSTETRICS & GYNECOLOGY

## 2022-07-19 PROCEDURE — 96417 CHEMO IV INFUS EACH ADDL SEQ: CPT

## 2022-07-19 PROCEDURE — 96413 CHEMO IV INFUSION 1 HR: CPT

## 2022-07-19 PROCEDURE — 74011250636 HC RX REV CODE- 250/636: Performed by: OBSTETRICS & GYNECOLOGY

## 2022-07-19 PROCEDURE — 96415 CHEMO IV INFUSION ADDL HR: CPT

## 2022-07-19 PROCEDURE — 77030012965 HC NDL HUBR BBMI -A

## 2022-07-19 PROCEDURE — 96375 TX/PRO/DX INJ NEW DRUG ADDON: CPT

## 2022-07-19 PROCEDURE — 96366 THER/PROPH/DIAG IV INF ADDON: CPT

## 2022-07-19 PROCEDURE — 74011000250 HC RX REV CODE- 250: Performed by: OBSTETRICS & GYNECOLOGY

## 2022-07-19 RX ORDER — SODIUM CHLORIDE 0.9 % (FLUSH) 0.9 %
10 SYRINGE (ML) INJECTION AS NEEDED
Status: DISPENSED | OUTPATIENT
Start: 2022-07-19 | End: 2022-07-19

## 2022-07-19 RX ORDER — DIPHENHYDRAMINE HYDROCHLORIDE 50 MG/ML
50 INJECTION, SOLUTION INTRAMUSCULAR; INTRAVENOUS ONCE
Status: COMPLETED | OUTPATIENT
Start: 2022-07-19 | End: 2022-07-19

## 2022-07-19 RX ORDER — SODIUM CHLORIDE 9 MG/ML
25 INJECTION, SOLUTION INTRAVENOUS CONTINUOUS
Status: DISPENSED | OUTPATIENT
Start: 2022-07-19 | End: 2022-07-19

## 2022-07-19 RX ORDER — LORAZEPAM 2 MG/ML
0.5 INJECTION INTRAMUSCULAR
Status: ACTIVE | OUTPATIENT
Start: 2022-07-19 | End: 2022-07-19

## 2022-07-19 RX ORDER — PALONOSETRON 0.05 MG/ML
0.25 INJECTION, SOLUTION INTRAVENOUS ONCE
Status: COMPLETED | OUTPATIENT
Start: 2022-07-19 | End: 2022-07-19

## 2022-07-19 RX ORDER — HEPARIN 100 UNIT/ML
300-500 SYRINGE INTRAVENOUS AS NEEDED
Status: ACTIVE | OUTPATIENT
Start: 2022-07-19 | End: 2022-07-19

## 2022-07-19 RX ORDER — DIPHENHYDRAMINE HYDROCHLORIDE 50 MG/ML
25 INJECTION, SOLUTION INTRAMUSCULAR; INTRAVENOUS AS NEEDED
Status: ACTIVE | OUTPATIENT
Start: 2022-07-19 | End: 2022-07-19

## 2022-07-19 RX ORDER — ONDANSETRON 2 MG/ML
8 INJECTION INTRAMUSCULAR; INTRAVENOUS AS NEEDED
Status: ACTIVE | OUTPATIENT
Start: 2022-07-19 | End: 2022-07-19

## 2022-07-19 RX ORDER — SODIUM CHLORIDE 9 MG/ML
10 INJECTION INTRAMUSCULAR; INTRAVENOUS; SUBCUTANEOUS AS NEEDED
Status: ACTIVE | OUTPATIENT
Start: 2022-07-19 | End: 2022-07-19

## 2022-07-19 RX ORDER — ACETAMINOPHEN 325 MG/1
650 TABLET ORAL AS NEEDED
Status: ACTIVE | OUTPATIENT
Start: 2022-07-19 | End: 2022-07-19

## 2022-07-19 RX ADMIN — SODIUM CHLORIDE 25 ML/HR: 9 INJECTION, SOLUTION INTRAVENOUS at 11:00

## 2022-07-19 RX ADMIN — PALONOSETRON 0.25 MG: 0.05 INJECTION, SOLUTION INTRAVENOUS at 11:19

## 2022-07-19 RX ADMIN — SODIUM CHLORIDE 150 MG: 9 INJECTION, SOLUTION INTRAVENOUS at 11:54

## 2022-07-19 RX ADMIN — DIPHENHYDRAMINE HYDROCHLORIDE 50 MG: 50 INJECTION, SOLUTION INTRAMUSCULAR; INTRAVENOUS at 11:22

## 2022-07-19 RX ADMIN — CARBOPLATIN 650 MG: 10 INJECTION, SOLUTION INTRAVENOUS at 15:45

## 2022-07-19 RX ADMIN — DEXAMETHASONE SODIUM PHOSPHATE 12 MG: 4 INJECTION, SOLUTION INTRAMUSCULAR; INTRAVENOUS at 11:30

## 2022-07-19 RX ADMIN — FAMOTIDINE 20 MG: 10 INJECTION, SOLUTION INTRAVENOUS at 11:17

## 2022-07-19 RX ADMIN — HEPARIN 500 UNITS: 100 SYRINGE at 16:21

## 2022-07-19 RX ADMIN — SODIUM CHLORIDE, PRESERVATIVE FREE 10 ML: 5 INJECTION INTRAVENOUS at 10:17

## 2022-07-19 RX ADMIN — PACLITAXEL 361 MG: 6 INJECTION, SOLUTION INTRAVENOUS at 12:24

## 2022-07-19 RX ADMIN — SODIUM CHLORIDE, PRESERVATIVE FREE 10 ML: 5 INJECTION INTRAVENOUS at 16:21

## 2022-07-19 NOTE — PROGRESS NOTES
[facility] Chemo Progress Note    Date: July 19, 2022        1005: Ms. Wang Sinclair Arrived to [facility] for  C4D1 Taxol + Carboplatin (Cold Cap) ambulatory in stable condition. Assessment was completed. Port accessed with positive blood return. Labs drawn on 7/15/22, reviewed today. Criteria for treatment was met. Chemotherapy Flowsheet 7/19/2022   Cycle C4   Date 7/19/2022   Drug / Regimen Taxol/Carboplatin   Pre Meds -   Notes -       Ms. Vicente's vitals were reviewed. Patient Vitals for the past 12 hrs:   Temp Pulse Resp BP   07/19/22 1741 -- 60 18 (!) 142/79   07/19/22 1009 (!) 96.5 °F (35.8 °C) 78 16 113/74     Pre-medications  were administered as ordered and chemotherapy was initiated.   Medications Administered     0.9% sodium chloride infusion     Admin Date  07/19/2022 Action  New Bag Dose  25 mL/hr Rate  25 mL/hr Route  IntraVENous Administered By  Jocelyne Barajas RN          0.9% sodium chloride injection 10 mL     Admin Date  07/19/2022 Action  Given Dose  10 mL Route  IntraVENous Administered By  Yolanda Bhatt RN          CARBOplatin (PARAPLATIN) 650 mg in 0.9% sodium chloride 250 mL, overfill volume 25 mL chemo infusion (GOG)     Admin Date  07/19/2022 Action  New Bag Dose  650 mg Rate  680 mL/hr Route  IntraVENous Administered By  Jocelyne Barajas RN          dexamethasone (DECADRON) 12 mg in 0.9% sodium chloride 50 mL, overfill volume 5 mL IVPB     Admin Date  07/19/2022 Action  New Bag Dose  12 mg Rate  232 mL/hr Route  IntraVENous Administered By  Jocelyne Barajas RN          diphenhydrAMINE (BENADRYL) injection 50 mg     Admin Date  07/19/2022 Action  Given Dose  50 mg Route  IntraVENous Administered By  Jocelyne Barajas RN          famotidine (PF) (PEPCID) 20 mg in 0.9% sodium chloride 10 mL injection     Admin Date  07/19/2022 Action  Given Dose  20 mg Route  IntraVENous Administered By  Jocelyne Barajas RN          fosaprepitant (EMEND) 150 mg in 0.9% sodium chloride 150 mL IVPB     Admin Date  07/19/2022 Action  New Bag Dose  150 mg Rate  450 mL/hr Route  IntraVENous Administered By  Rocio Soriano RN          heparin (porcine) pf 300-500 Units     Admin Date  07/19/2022 Action  Given Dose  500 Units Route  InterCATHeter Administered By  Rocio Soriano RN          PACLitaxeL (TAXOL) 361 mg in 0.9% sodium chloride 250 mL, overfill volume 25 mL chemo infusion     Admin Date  07/19/2022 Action  New Bag Dose  361 mg Rate  111.7 mL/hr Route  IntraVENous Administered By  Rocio Soriano RN          palonosetron HCl (ALOXI) injection 0.25 mg     Admin Date  07/19/2022 Action  Given Dose  0.25 mg Route  IntraVENous Administered By  Rocio Soriano RN          sodium chloride (NS) flush 10 mL     Admin Date  07/19/2022 Action  Given Dose  10 mL Route  IntraVENous Administered By  Marie Schultz RN           Admin Date  07/19/2022 Action  Given Dose  10 mL Route  IntraVENous Administered By  Rocio Soriano RN              Two nurses verified prior to administering: Drug name, Drug dose, Infusion volume or drug volume when prepared in a syringe, Rate of administration, Route of administration, Expiration dates and/or times, Appearance and physical integrity of the drugs, Rate set on infusion pump, when used, and Sequencing of drug administration. 1745: Patient tolerated treatment well. Port maintained positive blood return throughout treatment. Port flushed, heparinized and de accessed per protocol. Patient was discharged in stable condition. Patient is aware of next scheduled [facility] appointment on 8/9/22.     Future Appointments   Date Time Provider Marques Loaiza   7/27/2022  2:00 PM MD ABBEY Saba AMB   8/9/2022 10:00 AM E1 NANCY LONG [address] [place] [address]   8/24/2022 10:00 AM MD ABBEY Saba AMB   8/30/2022 10:00 AM E1 NANCY Gilman, FRANCISCO JAVIER, RN  July 19, 2022

## 2022-07-20 RX ORDER — DIPHENHYDRAMINE HYDROCHLORIDE 50 MG/ML
50 INJECTION, SOLUTION INTRAMUSCULAR; INTRAVENOUS ONCE
Status: CANCELLED | OUTPATIENT
Start: 2022-08-09 | End: 2022-08-09

## 2022-07-20 RX ORDER — ALBUTEROL SULFATE 0.83 MG/ML
2.5 SOLUTION RESPIRATORY (INHALATION) AS NEEDED
Status: CANCELLED
Start: 2022-08-09

## 2022-07-20 RX ORDER — HEPARIN 100 UNIT/ML
300-500 SYRINGE INTRAVENOUS AS NEEDED
Status: CANCELLED
Start: 2022-08-09

## 2022-07-20 RX ORDER — EPINEPHRINE 1 MG/ML
0.3 INJECTION, SOLUTION, CONCENTRATE INTRAVENOUS AS NEEDED
Status: CANCELLED | OUTPATIENT
Start: 2022-08-09

## 2022-07-20 RX ORDER — SODIUM CHLORIDE 0.9 % (FLUSH) 0.9 %
10 SYRINGE (ML) INJECTION AS NEEDED
Status: CANCELLED | OUTPATIENT
Start: 2022-08-09

## 2022-07-20 RX ORDER — SODIUM CHLORIDE 9 MG/ML
25 INJECTION, SOLUTION INTRAVENOUS CONTINUOUS
Status: CANCELLED | OUTPATIENT
Start: 2022-08-09

## 2022-07-20 RX ORDER — DIPHENHYDRAMINE HYDROCHLORIDE 50 MG/ML
25 INJECTION, SOLUTION INTRAMUSCULAR; INTRAVENOUS AS NEEDED
Status: CANCELLED
Start: 2022-08-09

## 2022-07-20 RX ORDER — PALONOSETRON 0.05 MG/ML
0.25 INJECTION, SOLUTION INTRAVENOUS ONCE
Status: CANCELLED | OUTPATIENT
Start: 2022-08-09 | End: 2022-08-09

## 2022-07-20 RX ORDER — ONDANSETRON 2 MG/ML
8 INJECTION INTRAMUSCULAR; INTRAVENOUS AS NEEDED
Status: CANCELLED | OUTPATIENT
Start: 2022-08-09

## 2022-07-20 RX ORDER — ACETAMINOPHEN 325 MG/1
650 TABLET ORAL AS NEEDED
Status: CANCELLED
Start: 2022-08-09

## 2022-07-20 RX ORDER — HYDROCORTISONE SODIUM SUCCINATE 100 MG/2ML
100 INJECTION, POWDER, FOR SOLUTION INTRAMUSCULAR; INTRAVENOUS AS NEEDED
Status: CANCELLED | OUTPATIENT
Start: 2022-08-09

## 2022-07-20 RX ORDER — LORAZEPAM 2 MG/ML
0.5 INJECTION INTRAMUSCULAR
Status: CANCELLED
Start: 2022-08-09

## 2022-07-20 RX ORDER — SODIUM CHLORIDE 9 MG/ML
10 INJECTION INTRAMUSCULAR; INTRAVENOUS; SUBCUTANEOUS AS NEEDED
Status: CANCELLED | OUTPATIENT
Start: 2022-08-09

## 2022-07-20 RX ORDER — DIPHENHYDRAMINE HYDROCHLORIDE 50 MG/ML
50 INJECTION, SOLUTION INTRAMUSCULAR; INTRAVENOUS AS NEEDED
Status: CANCELLED
Start: 2022-08-09

## 2022-07-26 NOTE — PROGRESS NOTES
Virtual Visit for pre chemo C5 Taxol/Carbo at [facility] on 8/9/2022, labs to be drawn on 8/5/2022    1. Have you been to the ER, urgent care clinic since your last visit? Hospitalized since your last visit?  no    2. Have you seen or consulted any other health care providers outside of the [address] [place] [address] since your last visit? Include any pap smears or colon screening.    no

## 2022-07-27 ENCOUNTER — VIRTUAL VISIT (OUTPATIENT)
Dept: GYNECOLOGY | Age: 67
End: 2022-07-27
Payer: MEDICARE

## 2022-07-27 DIAGNOSIS — Z79.899 ON ANTINEOPLASTIC CHEMOTHERAPY: ICD-10-CM

## 2022-07-27 DIAGNOSIS — C54.1 CARCINOSARCOMA OF ENDOMETRIUM (HCC): Primary | ICD-10-CM

## 2022-07-27 PROCEDURE — G8432 DEP SCR NOT DOC, RNG: HCPCS | Performed by: OBSTETRICS & GYNECOLOGY

## 2022-07-27 PROCEDURE — 3017F COLORECTAL CA SCREEN DOC REV: CPT | Performed by: OBSTETRICS & GYNECOLOGY

## 2022-07-27 PROCEDURE — 1123F ACP DISCUSS/DSCN MKR DOCD: CPT | Performed by: OBSTETRICS & GYNECOLOGY

## 2022-07-27 PROCEDURE — G0463 HOSPITAL OUTPT CLINIC VISIT: HCPCS | Performed by: OBSTETRICS & GYNECOLOGY

## 2022-07-27 PROCEDURE — G8400 PT W/DXA NO RESULTS DOC: HCPCS | Performed by: OBSTETRICS & GYNECOLOGY

## 2022-07-27 PROCEDURE — 1090F PRES/ABSN URINE INCON ASSESS: CPT | Performed by: OBSTETRICS & GYNECOLOGY

## 2022-07-27 PROCEDURE — 99214 OFFICE O/P EST MOD 30 MIN: CPT | Performed by: OBSTETRICS & GYNECOLOGY

## 2022-07-27 PROCEDURE — G8427 DOCREV CUR MEDS BY ELIG CLIN: HCPCS | Performed by: OBSTETRICS & GYNECOLOGY

## 2022-07-27 PROCEDURE — 1101F PT FALLS ASSESS-DOCD LE1/YR: CPT | Performed by: OBSTETRICS & GYNECOLOGY

## 2022-07-27 NOTE — PROGRESS NOTES
27 [facility] [address], [address]  P [phone]  F [phone]    Office Note  Patient ID:  Name:  Luz Wilder  MRN:  [id number]  :  1955/66 y.o. Date:  2022      HISTORY OF PRESENT ILLNESS:  Ms. Luz Wilder is a 74 y.o. 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 On 2022 underwent Robotic-assisted total laparoscopic hysterectomy, Bilateral salpingo-oophorectomy, Bilateral pelvic sentinel lymph node mapping and biopsy. 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 4mm out of 15 myometrial invasion. Negative washings. Negative SLNs. Negative CT C/A/P 3/23/2022. Initiated on adjuvant carboplatin AUC 6 + paclitaxel 175mg/m2 on 2022. Presents today for consideration of cycle 5. Tolerated treatment well overall. Initial History:  Ms. Luz Wilder is a 74 y.o.  postmenopausal female who presents as a new patient from Dr. Denis Holter for carcinosarcoma of the endometrium. The patient reported vaginal spotting/bleeding in February (). She underwent a pelvic ultrasound and endometrial biopsy with Dr. Denis Holter, which ultimately demonstrated carcinosarcoma. The patient denies pelvic or abdominal pain/bloating. Denies CP or SOB. Denies hematuria or hematochezia. She is without any other complaints. Pathology Review:   2022:  * * *FINAL PATHOLOGIC DIAGNOSIS* * *   1. Right pelvic sentinel lymph node, sentinel lymph node biopsy:        One benign lymph node, no tumor seen (0/1)   2. Left pelvic sentinel lymph node, sentinel lymph node biopsy:        Two benign lymph nodes, no tumor seen (0/2)   3.   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 (focal chondroid elements) with high grade epithelial component,   high grade serous carcinoma and minor component of clear cell carcinoma   (<5%)   Vascular invasion is identified   Mild chronic cervicitis, no tumor seen   Benign endocervical polyp with adjacent detached fragments carcinosarcoma   (See comment)   Bilateral ovaries, no tumor seen   Bilateral fallopian tubes, no tumor seen   Leiomyoma uteri   ENDOMETRIUM    SPECIMEN       Procedure: Total hysterectomy and bilateral salpingo-oophorectomy   TUMOR       Histologic Type: Carcinosarcoma (malignant mixed Müllerian tumor) with   high grade serous    carcinoma, and minor component of clear cell carcinoma       Histologic Grade: High Grade       Myometrial Invasion: Cannot be determined with certainty: The sample   is received in         pieces, so definitive depth of invasion cannot be rendered. The   deepest myometrial invasion of the         intact pieces of uterus measures 4mm of 15mm       Uterine Serosa Involvement: Not identified       Cervical Stromal Involvement: Not identified       Other Tissue / Organ Involvement: Not identified       Lymphovascular Invasion: Not identified   MARGINS       Margins: Not applicable   LYMPH NODES       Lymph Node Status:  All lymph nodes negative for tumor cells           Total Number of Pelvic Nodes Examined:           3           Number of Pelvic Gypsy Nodes Examined:           3           Total Number of Para-aortic Nodes Examined:      0           Number of Para-aortic Gypsy Nodes Examined:      0    PATHOLOGIC STAGE CLASSIFICATION (pTNM, AJCC 8th Edition)       Primary Tumor (pT):                          pT1b (based on intact   pieces of uterus)       Regional Lymph Nodes Modifier:                (sn)       Regional Lymph Nodes (pN):                     pN0   * * *Comment* * *   Due to the fragmented nature of the specimen upon receipt in the   laboratory, the presence of detached fragments of tumor on the slide   adjacent to sections of the endocervical polyp, are favored to represent   contamination, rather than true endocervical involvement. Immunohistochemical stains for pancytokeratin performed on the sentinel   lymph nodes (specimen #1(1A,1B,1F) and specimen #2(2A,2B, and 2C) are   negative for metastatic carcinoma cells. Additional immunohistochemical stains performed on a section of tumor   reveal the following staining pattern:   Estrogen Receptor: Negative   P63: Focal nuclear decoration of occasional basal epithelial cells   Desmin: Negative   Pankeratin(AE1/3): Diffuse epithelial cytoplasmic membrane decoration of   tumor cells   These findings support the diagnosis. 3/10/2022:  Specimen A: Endometrial biopsy: Features consistent with malignant mixed mesodermal tumor (carcinosarcoma) with an epithelial portion component of serous carcinoma. Imaging Review:   CT C/A/P 3/23/2022:  FINDINGS:   THYROID: No nodule. MEDIASTINUM: No mass or lymphadenopathy. KARL: No mass or lymphadenopathy. THORACIC AORTA: No dissection or aneurysm. MAIN PULMONARY ARTERY: Normal in caliber. TRACHEA/BRONCHI: Patent. ESOPHAGUS: No wall thickening or dilatation. HEART: Normal in size. PLEURA: No effusion or pneumothorax. LUNGS: No nodule, mass, or airspace disease. Incidentally noted is a 2 mm  subpleural nodule right upper lobe image 46 series 4  LIVER: No mass or biliary dilatation. There is hepatic steatosis  GALLBLADDER: There is a gallstone  SPLEEN: No mass. PANCREAS: No mass or ductal dilatation. ADRENALS: There is a 1 cm left adrenal nodule  KIDNEYS: No mass, calculus, or hydronephrosis. STOMACH: There is thickening of the body of the stomach most likely related to  incomplete distention  SMALL BOWEL: No dilatation or wall thickening. COLON: No dilatation or wall thickening. APPENDIX: Unremarkable. PERITONEUM: No ascites or pneumoperitoneum. RETROPERITONEUM: No lymphadenopathy or aortic aneurysm.   REPRODUCTIVE ORGANS: The endometrium is thickened measuring up to 5.3 cm. There  is suspicion of a 3.2 cm enhancing lesion in the anterior uterine myometrium and  a 2.8 cm enhancing lesion right uterine myometrium  URINARY BLADDER: Incompletely distended  BONES: No destructive bone lesion. There are small low densities at T1/T2  bilaterally consistent with small lateral meningocele. There is slight scoliosis  levoconvex lumbar spine with 3 mm of anterolisthesis of L4 on L5 and  degenerative changes mid to lower lumbar spine  ADDITIONAL COMMENTS: N/A  IMPRESSION  1. CT of the chest demonstrates no definite evidence of metastatic disease. 2. CT of the abdomen and pelvis demonstrates the endometrium to be thickened  measuring 5.3 cm. There is suspicion of 2 enhancing lesions in the uterine  myometrium. No adenopathy or mass is identified to suggest metastatic disease. There is a 1 cm nodule in the left adrenal gland which may represent a small  adenoma    Pelvic ultrasound 3/14/2022:  Impression: Multi-fibroid uterus. Possible fibroid versus polyp in uterine cavity. Ovaries are not visualized. ROS:  A comprehensive review of systems was negative except for that written in the History of Present Illness.  , 10 point ROS      ECOG ndGndrndanddndend:nd nd2nd Problem List:  Patient Active Problem List    Diagnosis Date Noted    On antineoplastic chemotherapy 05/24/2022    Endometrial cancer ([address] 04/07/2022    Carcinosarcoma of endometrium ([address] 03/22/2022    PMB (postmenopausal bleeding)     Obesity     Malignant neoplasm of uterus ([address] 03/10/2022     PMH:  Past Medical History:   Diagnosis Date    Arthritis 2005    Enlarged uterus     Malignant neoplasm of uterus ([address] 03/10/2022    endometrial biopsy done on 3/10/22 by Dr. Ribera [employer]    Obesity     PMB (postmenopausal bleeding)       PSH:  Past Surgical History:   Procedure Laterality Date    HX COLONOSCOPY      HX ORTHOPAEDIC Bilateral 2006/2017    knee replacement X2    HX OTHER SURGICAL  2016    repair of retina    HX TONSILLECTOMY      HX TUBAL LIGATION  1981    IR INSERT TUNL CVC W PORT OVER 5 YEARS  5/4/2022      Social History:  Social History     Tobacco Use    Smoking status: Never    Smokeless tobacco: Never   Substance Use Topics    Alcohol use: Yes     Alcohol/week: 7.0 standard drinks     Types: 7 Glasses of wine per week      Family History:  Family History   Problem Relation Age of Onset    Cancer Father         Bladder    Anesth Problems Neg Hx       Medications: (reviewed)  Current Outpatient Medications   Medication Sig    dexAMETHasone (DECADRON) 4 mg tablet Take 2 tablets with breakfast the day before chemo and for 2 days after chemo    ondansetron (ZOFRAN ODT) 4 mg disintegrating tablet 1 Tablet by SubLINGual route every eight (8) hours as needed for Nausea. Indications: nausea and vomiting caused by cancer drugs    lidocaine-prilocaine (EMLA) topical cream Apply small amount over port area and cover with band aid one hour before chemo    vit C/Zn gluc/herbal no. 325 (ELDERBERRY ZINC VIT C MM) by Mucous Membrane route daily. cholecalciferol, vitamin D3, (VITAMIN D3 PO) Take  by mouth. ubidecarenone (COQ-10 PO) Take  by mouth. KRILL OIL PO Take  by mouth. MULTIVITAMIN PO Take  by mouth. B.animalis,bifid,infantis,long (Probiotic 4X) 10-15 mg TbEC Take  by mouth. No current facility-administered medications for this visit. Allergies: (reviewed)  Allergies   Allergen Reactions    Pcn [Penicillins] Rash     Patient screened for any delayed non-IgE-mediated reaction to PCN. Patient notes the following:    No delayed non-IgE-mediated reaction to PCN    ALLERGY AT 13YEARS OF AGE-NO HOSPITALIZATION             OBJECTIVE:  *deferred today given video-conference visit for ongoing COVID-19 pandemic*   Physical Exam:  There were no vitals taken for this visit. General: Alert and oriented. No acute distress. Well-nourished  HEENT: No thyroid enlargment. Neck supple without restrictions.  Sclera normal. Normal occular motion. Moist mucous membranes. Lymphatics: No evidence of axillary, cervical, or subclavicular adenopathy. Respiratory: clear to auscultation and percussion to the bases. No CVAT. Cardiovascular: regular rate and rhythm. No murmurs, rubs, or gallops. Gastrointestinal: soft, non-tender, non-distended, no masses or organomegaly. Well-healed incision. Musculoskeletal: normal gait. No joint tenderness, deformity or swelling. No muscular tenderness. Extremities: extremities normal, atraumatic, no cyanosis or edema. Pelvic: exam chaperoned by nurse. Normal appearing external genitalia. On speculum exam, the vaginal cuff is intact and healing well. On bimanual, the uterus and cervix are surgically absent. Vaginal cuff intact without defect. No evidence of masses or nodularity on bimanual exam. Deferred rectovaginal exam.   Neuro: Grossly intact. Normal gait and movement. No acute deficit  Skin: No evidence of rashes or skin changes. Lab Date as available:    Lab Results   Component Value Date/Time    WBC 3.9 07/15/2022 09:16 AM    HGB 11.4 07/15/2022 09:16 AM    HCT 33.4 (L) 07/15/2022 09:16 AM    PLATELET 851 19/01/7221 09:16 AM    MCV 95 07/15/2022 09:16 AM     Lab Results   Component Value Date/Time    Sodium 145 (H) 07/15/2022 09:16 AM    Potassium 4.2 07/15/2022 09:16 AM    Chloride 105 07/15/2022 09:16 AM    CO2 25 07/15/2022 09:16 AM    Anion gap 6 04/08/2022 05:26 AM    Glucose 111 (H) 07/15/2022 09:16 AM    BUN 8 07/15/2022 09:16 AM    Creatinine 0.68 07/15/2022 09:16 AM    BUN/Creatinine ratio 12 07/15/2022 09:16 AM    GFR est AA >60 04/08/2022 05:26 AM    GFR est non-AA >60 04/08/2022 05:26 AM    Calcium 9.3 07/15/2022 09:16 AM         IMPRESSION/PLAN:    Ms. Al Garcia is a 74 y.o. 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  On 4/7/2022 underwent Robotic-assisted total laparoscopic hysterectomy, Bilateral salpingo-oophorectomy, Bilateral pelvic sentinel lymph node mapping and biopsy. 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 4mm out of 15 myometrial invasion. Negative washings. Negative SLNs. Negative CT C/A/P 3/23/2022. Initiated on adjuvant carboplatin AUC 6 + paclitaxel 175mg/m2 on 5/16/2022. Presents today for consideration of cycle 5. Problems:     Patient Active Problem List    Diagnosis Date Noted    On antineoplastic chemotherapy 05/24/2022    [facility]ometrial cancer [address] 04/07/2022    Carcinosarcoma of endometrium ([facility] 03/22/2022    PMB (postmenopausal bleeding)     Obesity     Malignant neoplasm of uterus ([facility] 03/10/2022     Reviewed patient's course to date. Initiated on adjuvant carboplatin AUC 6 + paclitaxel 175mg/m2 on 5/16/2022. Presents today for consideration of cycle 5. Tolerating treatment well overall. Per our prior discussion, based on her pathology being consistent with Stage Ia carcinosarcoma, the patient is considered a high-risk patient. I have recommended adjuvant chemotherapy, carboplatin AUC 6 + paclitaxel 175mg/m2, 6 cycles. I also discussed the role of possible VBT to follow her chemotherapy. Will follow-up pre-chemo labs. RTC in 3 weeks for consideration of her next cycle. An electronic signature was used to authenticate this note. Génesis Hogue MD    Pursuant to the emergency declaration unde the [facility], [facility] waiver authority and the [employer] and [employer] Act, this Virtual Visit was conducted, with patient's consent, to reduce the patient's risk of exposure to COVID-19 and provide continuity of care for an established patient. Patient identification was verified at the start of the visit: Yes    Services were provided through a video synchronous discussion virtually to substitute for in-person clinic visit.  Patient was at her individual home, while the provider was in his/her respective office.     I spent at least 40 minutes with this established patient, and >50% of the time was spent counseling and/or coordinating care regarding chemotherapy    Ze Cristina MD

## 2022-08-05 ENCOUNTER — TELEPHONE (OUTPATIENT)
Dept: GYNECOLOGY | Age: 67
End: 2022-08-05

## 2022-08-05 DIAGNOSIS — C54.9 MALIGNANT NEOPLASM OF BODY OF UTERUS, UNSPECIFIED SITE (HCC): Primary | ICD-10-CM

## 2022-08-05 DIAGNOSIS — C54.1 CARCINOSARCOMA OF ENDOMETRIUM (HCC): ICD-10-CM

## 2022-08-05 DIAGNOSIS — C54.1 ENDOMETRIAL CANCER (HCC): ICD-10-CM

## 2022-08-05 DIAGNOSIS — Z79.899 ON ANTINEOPLASTIC CHEMOTHERAPY: ICD-10-CM

## 2022-08-05 NOTE — TELEPHONE ENCOUNTER
Pt , she states she is at [address] [place] [address] in [address] [place] [address] to get her labs drawn and they have no order, will print and fax

## 2022-08-06 LAB
ALBUMIN SERPL-MCNC: 4.3 G/DL (ref 3.8–4.8)
ALBUMIN/GLOB SERPL: 2 {RATIO} (ref 1.2–2.2)
ALP SERPL-CCNC: 56 IU/L (ref 44–121)
ALT SERPL-CCNC: 16 IU/L (ref 0–32)
AST SERPL-CCNC: 16 IU/L (ref 0–40)
BASOPHILS # BLD AUTO: 0 X10E3/UL (ref 0–0.2)
BASOPHILS NFR BLD AUTO: 1 %
BILIRUB SERPL-MCNC: 0.3 MG/DL (ref 0–1.2)
BUN SERPL-MCNC: 25 MG/DL (ref 8–27)
BUN/CREAT SERPL: 34 (ref 12–28)
CALCIUM SERPL-MCNC: 9.4 MG/DL (ref 8.7–10.3)
CANCER AG125 SERPL-ACNC: 16.6 U/ML (ref 0–38.1)
CHLORIDE SERPL-SCNC: 106 MMOL/L (ref 96–106)
CO2 SERPL-SCNC: 22 MMOL/L (ref 20–29)
CREAT SERPL-MCNC: 0.73 MG/DL (ref 0.57–1)
EGFR: 90 ML/MIN/1.73
EOSINOPHIL # BLD AUTO: 0 X10E3/UL (ref 0–0.4)
EOSINOPHIL NFR BLD AUTO: 1 %
ERYTHROCYTE [DISTWIDTH] IN BLOOD BY AUTOMATED COUNT: 15 % (ref 11.7–15.4)
GLOBULIN SER CALC-MCNC: 2.2 G/DL (ref 1.5–4.5)
GLUCOSE SERPL-MCNC: 111 MG/DL (ref 65–99)
HCT VFR BLD AUTO: 32.5 % (ref 34–46.6)
HGB BLD-MCNC: 11 G/DL (ref 11.1–15.9)
IMM GRANULOCYTES # BLD AUTO: 0 X10E3/UL (ref 0–0.1)
IMM GRANULOCYTES NFR BLD AUTO: 0 %
LYMPHOCYTES # BLD AUTO: 1.5 X10E3/UL (ref 0.7–3.1)
LYMPHOCYTES NFR BLD AUTO: 40 %
MAGNESIUM SERPL-MCNC: 1.5 MG/DL (ref 1.6–2.3)
MCH RBC QN AUTO: 32 PG (ref 26.6–33)
MCHC RBC AUTO-ENTMCNC: 33.8 G/DL (ref 31.5–35.7)
MCV RBC AUTO: 95 FL (ref 79–97)
MONOCYTES # BLD AUTO: 0.5 X10E3/UL (ref 0.1–0.9)
MONOCYTES NFR BLD AUTO: 12 %
NEUTROPHILS # BLD AUTO: 1.7 X10E3/UL (ref 1.4–7)
NEUTROPHILS NFR BLD AUTO: 46 %
PLATELET # BLD AUTO: 164 X10E3/UL (ref 150–450)
POTASSIUM SERPL-SCNC: 3.8 MMOL/L (ref 3.5–5.2)
PROT SERPL-MCNC: 6.5 G/DL (ref 6–8.5)
RBC # BLD AUTO: 3.44 X10E6/UL (ref 3.77–5.28)
SODIUM SERPL-SCNC: 143 MMOL/L (ref 134–144)
WBC # BLD AUTO: 3.7 X10E3/UL (ref 3.4–10.8)

## 2022-08-09 ENCOUNTER — HOSPITAL ENCOUNTER (OUTPATIENT)
Dept: INFUSION THERAPY | Age: 67
Discharge: HOME | End: 2022-08-09
Payer: MEDICARE

## 2022-08-09 VITALS
RESPIRATION RATE: 18 BRPM | OXYGEN SATURATION: 95 % | SYSTOLIC BLOOD PRESSURE: 157 MMHG | WEIGHT: 206.4 LBS | HEIGHT: 63 IN | BODY MASS INDEX: 36.57 KG/M2 | HEART RATE: 64 BPM | DIASTOLIC BLOOD PRESSURE: 85 MMHG

## 2022-08-09 DIAGNOSIS — C54.1 CARCINOSARCOMA OF ENDOMETRIUM (HCC): ICD-10-CM

## 2022-08-09 DIAGNOSIS — Z51.11 ENCOUNTER FOR ANTINEOPLASTIC CHEMOTHERAPY: ICD-10-CM

## 2022-08-09 DIAGNOSIS — C54.1 ENDOMETRIAL CANCER (HCC): Primary | ICD-10-CM

## 2022-08-09 LAB
ALBUMIN SERPL-MCNC: 3.4 G/DL (ref 3.5–5)
ALBUMIN/GLOB SERPL: 0.9 {RATIO} (ref 1.1–2.2)
ALP SERPL-CCNC: 50 U/L (ref 45–117)
ALT SERPL-CCNC: 23 U/L (ref 12–78)
ANION GAP SERPL CALC-SCNC: 8 MMOL/L (ref 5–15)
AST SERPL-CCNC: 14 U/L (ref 15–37)
BASOPHILS # BLD: 0 K/UL (ref 0–0.1)
BASOPHILS NFR BLD: 0 % (ref 0–1)
BILIRUB SERPL-MCNC: 0.3 MG/DL (ref 0.2–1)
BUN SERPL-MCNC: 21 MG/DL (ref 6–20)
BUN/CREAT SERPL: 26 (ref 12–20)
CALCIUM SERPL-MCNC: 9.5 MG/DL (ref 8.5–10.1)
CANCER AG125 SERPL-ACNC: 13 U/ML (ref 1.5–35)
CHLORIDE SERPL-SCNC: 108 MMOL/L (ref 97–108)
CO2 SERPL-SCNC: 24 MMOL/L (ref 21–32)
CREAT SERPL-MCNC: 0.81 MG/DL (ref 0.55–1.02)
DIFFERENTIAL METHOD BLD: ABNORMAL
EOSINOPHIL # BLD: 0 K/UL (ref 0–0.4)
EOSINOPHIL NFR BLD: 0 % (ref 0–7)
ERYTHROCYTE [DISTWIDTH] IN BLOOD BY AUTOMATED COUNT: 15.1 % (ref 11.5–14.5)
GLOBULIN SER CALC-MCNC: 3.6 G/DL (ref 2–4)
GLUCOSE SERPL-MCNC: 109 MG/DL (ref 65–100)
HCT VFR BLD AUTO: 31 % (ref 35–47)
HGB BLD-MCNC: 10.7 G/DL (ref 11.5–16)
IMM GRANULOCYTES # BLD AUTO: 0.1 K/UL (ref 0–0.04)
IMM GRANULOCYTES NFR BLD AUTO: 1 % (ref 0–0.5)
LYMPHOCYTES # BLD: 2.2 K/UL (ref 0.8–3.5)
LYMPHOCYTES NFR BLD: 28 % (ref 12–49)
MAGNESIUM SERPL-MCNC: 1.8 MG/DL (ref 1.6–2.4)
MCH RBC QN AUTO: 32.8 PG (ref 26–34)
MCHC RBC AUTO-ENTMCNC: 34.5 G/DL (ref 30–36.5)
MCV RBC AUTO: 95.1 FL (ref 80–99)
MONOCYTES # BLD: 0.8 K/UL (ref 0–1)
MONOCYTES NFR BLD: 10 % (ref 5–13)
NEUTS SEG # BLD: 4.9 K/UL (ref 1.8–8)
NEUTS SEG NFR BLD: 61 % (ref 32–75)
NRBC # BLD: 0 K/UL (ref 0–0.01)
NRBC BLD-RTO: 0 PER 100 WBC
PLATELET # BLD AUTO: 141 K/UL (ref 150–400)
PMV BLD AUTO: 11.2 FL (ref 8.9–12.9)
POTASSIUM SERPL-SCNC: 3.3 MMOL/L (ref 3.5–5.1)
PROT SERPL-MCNC: 7 G/DL (ref 6.4–8.2)
RBC # BLD AUTO: 3.26 M/UL (ref 3.8–5.2)
SODIUM SERPL-SCNC: 140 MMOL/L (ref 136–145)
WBC # BLD AUTO: 8 K/UL (ref 3.6–11)

## 2022-08-09 PROCEDURE — 80053 COMPREHEN METABOLIC PANEL: CPT

## 2022-08-09 PROCEDURE — 96413 CHEMO IV INFUSION 1 HR: CPT

## 2022-08-09 PROCEDURE — 36415 COLL VENOUS BLD VENIPUNCTURE: CPT

## 2022-08-09 PROCEDURE — 74011250636 HC RX REV CODE- 250/636: Performed by: OBSTETRICS & GYNECOLOGY

## 2022-08-09 PROCEDURE — 96415 CHEMO IV INFUSION ADDL HR: CPT

## 2022-08-09 PROCEDURE — 77030012965 HC NDL HUBR BBMI -A

## 2022-08-09 PROCEDURE — 96375 TX/PRO/DX INJ NEW DRUG ADDON: CPT

## 2022-08-09 PROCEDURE — 96367 TX/PROPH/DG ADDL SEQ IV INF: CPT

## 2022-08-09 PROCEDURE — 99214 OFFICE O/P EST MOD 30 MIN: CPT | Performed by: PHYSICIAN ASSISTANT

## 2022-08-09 PROCEDURE — 85025 COMPLETE CBC W/AUTO DIFF WBC: CPT

## 2022-08-09 PROCEDURE — 83735 ASSAY OF MAGNESIUM: CPT

## 2022-08-09 PROCEDURE — 74011000258 HC RX REV CODE- 258: Performed by: OBSTETRICS & GYNECOLOGY

## 2022-08-09 PROCEDURE — 74011000250 HC RX REV CODE- 250: Performed by: OBSTETRICS & GYNECOLOGY

## 2022-08-09 PROCEDURE — 86304 IMMUNOASSAY TUMOR CA 125: CPT

## 2022-08-09 PROCEDURE — 96417 CHEMO IV INFUS EACH ADDL SEQ: CPT

## 2022-08-09 PROCEDURE — 74011250637 HC RX REV CODE- 250/637: Performed by: PHYSICIAN ASSISTANT

## 2022-08-09 RX ORDER — HEPARIN 100 UNIT/ML
300-500 SYRINGE INTRAVENOUS AS NEEDED
Status: ACTIVE | OUTPATIENT
Start: 2022-08-09 | End: 2022-08-09

## 2022-08-09 RX ORDER — DIPHENHYDRAMINE HYDROCHLORIDE 50 MG/ML
50 INJECTION, SOLUTION INTRAMUSCULAR; INTRAVENOUS AS NEEDED
Status: ACTIVE | OUTPATIENT
Start: 2022-08-09 | End: 2022-08-09

## 2022-08-09 RX ORDER — ONDANSETRON 2 MG/ML
8 INJECTION INTRAMUSCULAR; INTRAVENOUS AS NEEDED
Status: ACTIVE | OUTPATIENT
Start: 2022-08-09 | End: 2022-08-09

## 2022-08-09 RX ORDER — PALONOSETRON 0.05 MG/ML
0.25 INJECTION, SOLUTION INTRAVENOUS ONCE
Status: COMPLETED | OUTPATIENT
Start: 2022-08-09 | End: 2022-08-09

## 2022-08-09 RX ORDER — HYDROCORTISONE SODIUM SUCCINATE 100 MG/2ML
100 INJECTION, POWDER, FOR SOLUTION INTRAMUSCULAR; INTRAVENOUS AS NEEDED
Status: ACTIVE | OUTPATIENT
Start: 2022-08-09 | End: 2022-08-09

## 2022-08-09 RX ORDER — POTASSIUM CHLORIDE 750 MG/1
40 TABLET, FILM COATED, EXTENDED RELEASE ORAL
Status: COMPLETED | OUTPATIENT
Start: 2022-08-09 | End: 2022-08-09

## 2022-08-09 RX ORDER — ACETAMINOPHEN 325 MG/1
650 TABLET ORAL AS NEEDED
Status: ACTIVE | OUTPATIENT
Start: 2022-08-09 | End: 2022-08-09

## 2022-08-09 RX ORDER — DIPHENHYDRAMINE HYDROCHLORIDE 50 MG/ML
50 INJECTION, SOLUTION INTRAMUSCULAR; INTRAVENOUS ONCE
Status: COMPLETED | OUTPATIENT
Start: 2022-08-09 | End: 2022-08-09

## 2022-08-09 RX ORDER — SODIUM CHLORIDE 9 MG/ML
10 INJECTION INTRAMUSCULAR; INTRAVENOUS; SUBCUTANEOUS AS NEEDED
Status: ACTIVE | OUTPATIENT
Start: 2022-08-09 | End: 2022-08-09

## 2022-08-09 RX ORDER — DIPHENHYDRAMINE HYDROCHLORIDE 50 MG/ML
25 INJECTION, SOLUTION INTRAMUSCULAR; INTRAVENOUS AS NEEDED
Status: ACTIVE | OUTPATIENT
Start: 2022-08-09 | End: 2022-08-09

## 2022-08-09 RX ORDER — ALBUTEROL SULFATE 0.83 MG/ML
2.5 SOLUTION RESPIRATORY (INHALATION) AS NEEDED
Status: ACTIVE | OUTPATIENT
Start: 2022-08-09 | End: 2022-08-09

## 2022-08-09 RX ORDER — EPINEPHRINE 1 MG/ML
0.3 INJECTION, SOLUTION, CONCENTRATE INTRAVENOUS AS NEEDED
Status: ACTIVE | OUTPATIENT
Start: 2022-08-09 | End: 2022-08-09

## 2022-08-09 RX ORDER — SODIUM CHLORIDE 0.9 % (FLUSH) 0.9 %
10 SYRINGE (ML) INJECTION AS NEEDED
Status: DISPENSED | OUTPATIENT
Start: 2022-08-09 | End: 2022-08-09

## 2022-08-09 RX ORDER — SODIUM CHLORIDE 9 MG/ML
25 INJECTION, SOLUTION INTRAVENOUS CONTINUOUS
Status: DISPENSED | OUTPATIENT
Start: 2022-08-09 | End: 2022-08-09

## 2022-08-09 RX ADMIN — DIPHENHYDRAMINE HYDROCHLORIDE 50 MG: 50 INJECTION, SOLUTION INTRAMUSCULAR; INTRAVENOUS at 12:53

## 2022-08-09 RX ADMIN — SODIUM CHLORIDE, PRESERVATIVE FREE 10 ML: 5 INJECTION INTRAVENOUS at 17:19

## 2022-08-09 RX ADMIN — PACLITAXEL 361 MG: 6 INJECTION, SOLUTION INTRAVENOUS at 13:26

## 2022-08-09 RX ADMIN — HEPARIN 500 UNITS: 100 SYRINGE at 17:19

## 2022-08-09 RX ADMIN — CARBOPLATIN 584 MG: 10 INJECTION, SOLUTION INTRAVENOUS at 16:45

## 2022-08-09 RX ADMIN — SODIUM CHLORIDE 150 MG: 900 INJECTION, SOLUTION INTRAVENOUS at 12:20

## 2022-08-09 RX ADMIN — SODIUM CHLORIDE 25 ML/HR: 9 INJECTION, SOLUTION INTRAVENOUS at 12:15

## 2022-08-09 RX ADMIN — PALONOSETRON 0.25 MG: 0.05 INJECTION, SOLUTION INTRAVENOUS at 12:16

## 2022-08-09 RX ADMIN — POTASSIUM CHLORIDE 40 MEQ: 750 TABLET, FILM COATED, EXTENDED RELEASE ORAL at 14:43

## 2022-08-09 RX ADMIN — SODIUM CHLORIDE, PRESERVATIVE FREE 10 ML: 5 INJECTION INTRAVENOUS at 11:45

## 2022-08-09 RX ADMIN — DEXAMETHASONE SODIUM PHOSPHATE 12 MG: 4 INJECTION, SOLUTION INTRAMUSCULAR; INTRAVENOUS at 12:22

## 2022-08-09 RX ADMIN — FAMOTIDINE 20 MG: 10 INJECTION INTRAVENOUS at 12:48

## 2022-08-09 NOTE — PROGRESS NOTES
[facility] GYNECOLOGIC ONCOLOGY  [address] [place] [address], [address], [address]  P [phone]  F [phone]      Patient ID:  Name:  Julia Urban  MRN:  [id number]  :  1955/66 y.o. Date:  2022      Yusra Bustillos MD: Dr. Sarah Arceo  PCP: Arsenio Caballero MD     Primary Diagnosis: uterine sarcoma  Date of Diagnosis: 2022      Current Agent: taxol/carboplatin  Cycle: 5      HPI:  74 y.o. 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 Possible fibroid versus polyp in uterine cavity. Ovaries are not visualized. 3/23/22 CT CAP:  1. CT of the chest demonstrates no definite evidence of metastatic disease. 2. CT of the abdomen and pelvis demonstrates the endometrium to be thickened measuring 5.3 cm. There is suspicion of 2 enhancing lesions in the uterine myometrium. No adenopathy or mass is identified to suggest metastatic disease. There is a 1 cm nodule in the left adrenal gland which may represent a small adenoma     22 RA TLHBSO, pSLN  1. Right pelvic sentinel lymph node, sentinel lymph node biopsy:        One benign lymph node, no tumor seen (0/1)     2. Left pelvic sentinel lymph node, sentinel lymph node biopsy:        Two benign lymph nodes, no tumor seen (0/2)     3.   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 (focal chondroid elements) with high grade epithelial component,   high grade serous carcinoma and minor component of clear cell carcinoma   (<5%)   Vascular invasion is identified   Mild chronic cervicitis, no tumor seen   Benign endocervical polyp with adjacent detached fragments carcinosarcoma   (See comment)   Bilateral ovaries, no tumor seen   Bilateral fallopian tubes, no tumor seen   Leiomyoma uteri     ENDOMETRIUM    SPECIMEN       Procedure: Total hysterectomy and bilateral salpingo-oophorectomy       TUMOR       Histologic Type: Carcinosarcoma (malignant mixed Müllerian tumor) with   high grade serous    carcinoma, and minor component of clear cell carcinoma       Histologic Grade: High Grade       Myometrial Invasion: Cannot be determined with certainty: The sample   is received in         pieces, so definitive depth of invasion cannot be rendered. The   deepest myometrial invasion of the         intact pieces of uterus measures 4mm of 15mm       Uterine Serosa Involvement: Not identified       Cervical Stromal Involvement: Not identified       Other Tissue / Organ Involvement: Not identified       Lymphovascular Invasion: Not identified     MARGINS       Margins: Not applicable     LYMPH NODES       Lymph Node Status: All lymph nodes negative for tumor cells           Total Number of Pelvic Nodes Examined:           3           Number of Pelvic Hendricks Nodes Examined:           3           Total Number of Para-aortic Nodes Examined:      0           Number of Para-aortic Hendricks Nodes Examined:      0      PATHOLOGIC STAGE CLASSIFICATION (pTNM, AJCC 8th Edition)       Primary Tumor (pT):                          pT1b (based on intact   pieces of uterus)       Regional Lymph Nodes Modifier:                (sn)       Regional Lymph Nodes (pN):                     pN0    5/16/22 [id number]           SUBJECTIVE:  Ms. Sanjuana Jackson comes in to the [facility] today for cycle 5 carboplatin/taxol chemotherapy. She states that she is tolerating the chemotherapy well. She does complain of mild neuropathy in her feet. She has had no trouble walking and the symptoms do not affect her activity level or sleep.   No nausea. Diet has been well maintained although she states that her tastes have changed. ROS  Constitutional: no weight loss, fever, night sweats  Respiratory: no cough, shortness of breath, or wheezing  Cardiovascular: no chest pain or dyspnea on exertion  Heme: No abnormal bleeding  Gastrointestinal: no abdominal pain, change in bowel habits, or black or bloody stools  Genito-Urinary: no dysuria, trouble voiding, or hematuria  Musculoskeletal: negative for - joint pain or muscle pain  Neurological: negative for - dizziness, headaches or numbness/tingling  Derm: negative  Psych: negative for depression       OBJECTIVE:  Physical Exam  Visit Vitals  /68   Pulse 80   Resp 18   Ht 5' 3\" (1.6 m)   Wt 206 lb 6.4 oz (93.6 kg)   SpO2 95%   Breastfeeding No   BMI 36.56 kg/m²        General:  alert, cooperative, no distress       HEENT: without pallor, sclera without jaundice, oral mucosa without lesions,      Cardiac:  Regular rate and rhythm        Lungs:  clear to auscultation bilaterally          Port:  clean, dry, no drainage  Abdomen:  soft, non-tender, without masses or organomegaly       Lymph:  no lymphadenopathy   Extremity: no edema    Lab Results   Component Value Date/Time    WBC 8.0 08/09/2022 10:15 AM    HGB 10.7 (L) 08/09/2022 10:15 AM    HCT 31.0 (L) 08/09/2022 10:15 AM    PLATELET 883 (L) 34/61/8801 10:15 AM    MCV 95.1 08/09/2022 10:15 AM     Lab Results   Component Value Date/Time    ABS.  NEUTROPHILS 4.9 08/09/2022 10:15 AM     Lab Results   Component Value Date/Time    Sodium 140 08/09/2022 10:15 AM    Potassium 3.3 (L) 08/09/2022 10:15 AM    Chloride 108 08/09/2022 10:15 AM    CO2 24 08/09/2022 10:15 AM    Anion gap 8 08/09/2022 10:15 AM    Glucose 109 (H) 08/09/2022 10:15 AM    BUN 21 (H) 08/09/2022 10:15 AM    Creatinine 0.81 08/09/2022 10:15 AM    BUN/Creatinine ratio 26 (H) 08/09/2022 10:15 AM    GFR est AA >60 08/09/2022 10:15 AM    GFR est non-AA >60 08/09/2022 10:15 AM    Calcium 9.5 08/09/2022 10:15 AM    Bilirubin, total 0.3 08/09/2022 10:15 AM    Alk. phosphatase 50 08/09/2022 10:15 AM    Protein, total 7.0 08/09/2022 10:15 AM    Albumin 3.4 (L) 08/09/2022 10:15 AM    Globulin 3.6 08/09/2022 10:15 AM    A-G Ratio 0.9 (L) 08/09/2022 10:15 AM    ALT (SGPT) 23 08/09/2022 10:15 AM    AST (SGOT) 14 (L) 08/09/2022 10:15 AM         Recent Labs     08/09/22  1015   WBC 8.0   ANEU 4.9   HGB 10.7*   HCT 31.0*   MCV 95.1   MCH 32.8   *     Recent Labs     08/09/22  1015      K 3.3*      *   BUN 21*   CREA 0.81   CA 9.5   MG 1.8   ALB 3.4*   TBILI 0.3   ALT 23       Tumor markers  Cancer Ag (CA) 125   Date Value Ref Range Status   08/05/2022 16.6 0.0 - 38.1 U/mL Final     Comment:     [employer] Electrochemiluminescence Immunoassay (ECLIA)  Values obtained with different assay methods or kits cannot be  used interchangeably. Results cannot be interpreted as absolute  evidence of the presence or absence of malignant disease. 07/15/2022 19.0 0.0 - 38.1 U/mL Final     Comment:     [employer] Electrochemiluminescence Immunoassay (ECLIA)  Values obtained with different assay methods or kits cannot be  used interchangeably. Results cannot be interpreted as absolute  evidence of the presence or absence of malignant disease. 06/24/2022 19.8 0.0 - 38.1 U/mL Final     Comment:     [employer] Electrochemiluminescence Immunoassay (ECLIA)  Values obtained with different assay methods or kits cannot be  used interchangeably. Results cannot be interpreted as absolute  evidence of the presence or absence of malignant disease. 06/02/2022 18.9 0.0 - 38.1 U/mL Final     Comment:     [employer] Electrochemiluminescence Immunoassay (ECLIA)  Values obtained with different assay methods or kits cannot be  used interchangeably. Results cannot be interpreted as absolute  evidence of the presence or absence of malignant disease.      05/12/2022 31.9 0.0 - 38.1 U/mL Final     Comment:     [employer] Electrochemiluminescence Immunoassay (ECLIA)  Values obtained with different assay methods or kits cannot be  used interchangeably. Results cannot be interpreted as absolute  evidence of the presence or absence of malignant disease. Patient Active Problem List   Diagnosis Code    PMB (postmenopausal bleeding) N95.0    Obesity E66.9    Malignant neoplasm of uterus ([address] C55    Carcinosarcoma of endometrium ([address] C54.1    Endometrial cancer ([facility] C54.1    On antineoplastic chemotherapy Z79.899     Past Medical History:   Diagnosis Date    Arthritis 2005    Enlarged uterus     Malignant neoplasm of uterus ([facility] [address] 03/10/2022    endometrial biopsy done on 3/10/22 by Dr. Cesar Valenzuela    Obesity     PMB (postmenopausal bleeding)      Prior to Admission medications    Medication Sig Start Date End Date Taking? Authorizing Provider   dexAMETHasone (DECADRON) 4 mg tablet Take 2 tablets with breakfast the day before chemo and for 2 days after chemo 5/2/22   Marisabel Meade MD   ondansetron (ZOFRAN ODT) 4 mg disintegrating tablet 1 Tablet by SubLINGual route every eight (8) hours as needed for Nausea. Indications: nausea and vomiting caused by cancer drugs 5/2/22   Marisabel Meade MD   lidocaine-prilocaine (EMLA) topical cream Apply small amount over port area and cover with band aid one hour before chemo 5/2/22   Marisabel Meade MD   vit C/Zn gluc/herbal no. 325 (ELDERBERRY ZINC VIT C MM) by Mucous Membrane route daily. Provider, Historical   cholecalciferol, vitamin D3, (VITAMIN D3 PO) Take  by mouth. Provider, Historical   ubidecarenone (COQ-10 PO) Take  by mouth. Provider, Historical   KRILL OIL PO Take  by mouth. Provider, Historical   MULTIVITAMIN PO Take  by mouth. Provider, Historical   B.animalis,bifid,infantis,long (Probiotic 4X) 10-15 mg TbEC Take  by mouth.     Provider, Historical     Allergies   Allergen Reactions    Pcn [Penicillins] Rash Patient screened for any delayed non-IgE-mediated reaction to PCN. Patient notes the following:    No delayed non-IgE-mediated reaction to PCN    ALLERGY AT 13YEARS OF AGE-NO HOSPITALIZATION           Family History   Problem Relation Age of Onset    Cancer Father         Bladder    Anesth Problems Neg Hx          CT Results (most recent):  Results from Hospital Encounter encounter on 03/23/22    CT ABD PELV W CONT    Narrative  EXAM:  CT CHEST W CONT, CT ABD PELV W CONT    INDICATION: Carcinosarcoma of the endometrium    COMPARISON: None    CONTRAST:  100 mL of Isovue-370. TECHNIQUE:  Following the uneventful intravenous administration of contrast, thin axial  images were obtained through the chest, abdomen and pelvis. Coronal and sagittal  reconstructions were generated. Oral contrast was administered. CT dose  reduction was achieved through use of a standardized protocol tailored for this  examination and automatic exposure control for dose modulation. FINDINGS:    THYROID: No nodule. MEDIASTINUM: No mass or lymphadenopathy. KARL: No mass or lymphadenopathy. THORACIC AORTA: No dissection or aneurysm. MAIN PULMONARY ARTERY: Normal in caliber. TRACHEA/BRONCHI: Patent. ESOPHAGUS: No wall thickening or dilatation. HEART: Normal in size. PLEURA: No effusion or pneumothorax. LUNGS: No nodule, mass, or airspace disease. Incidentally noted is a 2 mm  subpleural nodule right upper lobe image 46 series 4  LIVER: No mass or biliary dilatation. There is hepatic steatosis  GALLBLADDER: There is a gallstone  SPLEEN: No mass. PANCREAS: No mass or ductal dilatation. ADRENALS: There is a 1 cm left adrenal nodule  KIDNEYS: No mass, calculus, or hydronephrosis. STOMACH: There is thickening of the body of the stomach most likely related to  incomplete distention  SMALL BOWEL: No dilatation or wall thickening. COLON: No dilatation or wall thickening. APPENDIX: Unremarkable.   PERITONEUM: No ascites or pneumoperitoneum. RETROPERITONEUM: No lymphadenopathy or aortic aneurysm. REPRODUCTIVE ORGANS: The endometrium is thickened measuring up to 5.3 cm. There  is suspicion of a 3.2 cm enhancing lesion in the anterior uterine myometrium and  a 2.8 cm enhancing lesion right uterine myometrium  URINARY BLADDER: Incompletely distended  BONES: No destructive bone lesion. There are small low densities at T1/T2  bilaterally consistent with small lateral meningocele. There is slight scoliosis  levoconvex lumbar spine with 3 mm of anterolisthesis of L4 on L5 and  degenerative changes mid to lower lumbar spine  ADDITIONAL COMMENTS: N/A    Impression  1. CT of the chest demonstrates no definite evidence of metastatic disease. 2. CT of the abdomen and pelvis demonstrates the endometrium to be thickened  measuring 5.3 cm. There is suspicion of 2 enhancing lesions in the uterine  myometrium. No adenopathy or mass is identified to suggest metastatic disease. There is a 1 cm nodule in the left adrenal gland which may represent a small  adenoma          IMPRESSION/PLAN:  74 y.o. with a stage Ia, uterine carcinosarcoma s/p staging. ECO    Patient Active Problem List   Diagnosis Code    PMB (postmenopausal bleeding) N95.0    Obesity E66.9    Malignant neoplasm of uterus ([address] C55    Carcinosarcoma of endometrium ([address] C54.1    Endometrial cancer (H[facility]) C54.1    On antineoplastic chemotherapy Z79.899     Labs/chart reviewed and discussed with the patient. Chemotherapy: proceed with cycle 5 taxol/carboplatin today. Hypokalemia: potassium mildly decreased today. Will give oral replacement here. Discussed dietary sources of potassium   Psychosocial: Present today with a friend. Friends and family have been supportive. No financial concerns.        Electronically signed    Aundrea Saldaña PA-C  Gyn Onc

## 2022-08-09 NOTE — PROGRESS NOTES
[facility] Chemo Progress Note    Date: 2022    Name: Zaki Enriquez    MRN: [id number]         : 1955    1000 Ms. Vicente Arrived to [facility] for C5 Taxol/Carboplatin (cold cap) ambulatory in stable condition. Assessment and port access completed by Sharon Acosta RN. Chemotherapy Flowsheet 2022   Cycle C5   Date 2022   Drug / Regimen Taxol/Carbo   Pre Meds given   Notes given + PO K+           Ms. Jassi Bah vitals were reviewed. Patient Vitals for the past 12 hrs:   Pulse Resp BP SpO2   22 1846 64 -- (!) 157/85 --   22 1021 80 18 135/68 95 %   22 1011 -- -- 135/68 --             Pre-medications  were administered as ordered and chemotherapy was initiated.   Medications Administered       0.9% sodium chloride infusion       Admin Date  2022 Action  New Bag Dose  25 mL/hr Rate  25 mL/hr Route  IntraVENous Administered By  Mark Zaragoza RN              0.9% sodium chloride injection 10 mL       Admin Date  2022 Action  Given Dose  10 mL Route  IntraVENous Administered By  Ashok Terrazas RN              CARBOplatin (PARAPLATIN) 584 mg in 0.9% sodium chloride 250 mL, overfill volume 25 mL chemo infusion (GOG)       Admin Date  2022 Action  New Bag Dose  584 mg Rate  666.8 mL/hr Route  IntraVENous Administered By  Mark Zaragoza RN              dexamethasone (DECADRON) 12 mg in 0.9% sodium chloride 50 mL, overfill volume 5 mL IVPB       Admin Date  2022 Action  New Bag Dose  12 mg Rate  232 mL/hr Route  IntraVENous Administered By  Mark Zaragoza RN              diphenhydrAMINE (BENADRYL) injection 50 mg       Admin Date  2022 Action  Given Dose  50 mg Route  IntraVENous Administered By  Mark Zaragoza RN              famotidine (PF) (PEPCID) 20 mg in 0.9% sodium chloride 10 mL injection       Admin Date  2022 Action  Given Dose  20 mg Route  IntraVENous Administered By  Mark Zaragoza RN              fosaprepitant (EMEND) 150 mg in 0.9% sodium chloride 150 mL IVPB       Admin Date  08/09/2022 Action  New Bag Dose  150 mg Rate  450 mL/hr Route  IntraVENous Administered By  Bhaskar Junior RN              heparin (porcine) pf 300-500 Units       Admin Date  08/09/2022 Action  Given Dose  500 Units Route  InterCATHeter Administered By  Bhaskar Junior RN              PACLitaxeL (TAXOL) 361 mg in 0.9% sodium chloride 250 mL, overfill volume 25 mL chemo infusion       Admin Date  08/09/2022 Action  New Bag Dose  361 mg Rate  111.7 mL/hr Route  IntraVENous Administered By  Bhaskar Junior RN              palonosetron HCl (ALOXI) injection 0.25 mg       Admin Date  08/09/2022 Action  Given Dose  0.25 mg Route  IntraVENous Administered By  Bhaskar Junior RN              potassium chloride SR (KLOR-CON 10) tablet 40 mEq       Admin Date  08/09/2022 Action  Given Dose  40 mEq Route  Oral Administered By  Bhaskar Junior RN              sodium chloride (NS) flush 10 mL       Admin Date  08/09/2022 Action  Given Dose  10 mL Route  IntraVENous Administered By  Bhaskar Junior RN                      4211 Patient tolerated treatment well. Pt post cooled with cold cap for 90 min post infusion. Port maintained positive blood return throughout treatment. Port flushed, heparinized and de accessed per protocol. Patient was discharged from [facility] in stable condition. Patient aware of next appointment.      Future Appointments   Date Time Provider Marques Loaiza   8/24/2022 10:00 AM MD JACI MendozaO BS AMB   8/30/2022 10:00 AM KIM [address] FRANCISCO JAVIER  August 9, 2022

## 2022-08-19 RX ORDER — DIPHENHYDRAMINE HYDROCHLORIDE 50 MG/ML
50 INJECTION, SOLUTION INTRAMUSCULAR; INTRAVENOUS ONCE
Status: CANCELLED | OUTPATIENT
Start: 2022-08-30 | End: 2022-08-30

## 2022-08-19 RX ORDER — HYDROCORTISONE SODIUM SUCCINATE 100 MG/2ML
100 INJECTION, POWDER, FOR SOLUTION INTRAMUSCULAR; INTRAVENOUS AS NEEDED
Status: CANCELLED | OUTPATIENT
Start: 2022-08-30

## 2022-08-19 RX ORDER — ALBUTEROL SULFATE 0.83 MG/ML
2.5 SOLUTION RESPIRATORY (INHALATION) AS NEEDED
Status: CANCELLED
Start: 2022-08-30

## 2022-08-19 RX ORDER — ACETAMINOPHEN 325 MG/1
650 TABLET ORAL AS NEEDED
Status: CANCELLED
Start: 2022-08-30

## 2022-08-19 RX ORDER — PALONOSETRON 0.05 MG/ML
0.25 INJECTION, SOLUTION INTRAVENOUS ONCE
Status: CANCELLED | OUTPATIENT
Start: 2022-08-30 | End: 2022-08-30

## 2022-08-19 RX ORDER — DIPHENHYDRAMINE HYDROCHLORIDE 50 MG/ML
25 INJECTION, SOLUTION INTRAMUSCULAR; INTRAVENOUS AS NEEDED
Status: CANCELLED
Start: 2022-08-30

## 2022-08-19 RX ORDER — EPINEPHRINE 1 MG/ML
0.3 INJECTION, SOLUTION, CONCENTRATE INTRAVENOUS AS NEEDED
Status: CANCELLED | OUTPATIENT
Start: 2022-08-30

## 2022-08-19 RX ORDER — SODIUM CHLORIDE 0.9 % (FLUSH) 0.9 %
10 SYRINGE (ML) INJECTION AS NEEDED
Status: CANCELLED | OUTPATIENT
Start: 2022-08-30

## 2022-08-19 RX ORDER — LORAZEPAM 2 MG/ML
0.5 INJECTION INTRAMUSCULAR
Status: CANCELLED
Start: 2022-08-30

## 2022-08-19 RX ORDER — SODIUM CHLORIDE 9 MG/ML
25 INJECTION, SOLUTION INTRAVENOUS CONTINUOUS
Status: CANCELLED | OUTPATIENT
Start: 2022-08-30

## 2022-08-19 RX ORDER — DIPHENHYDRAMINE HYDROCHLORIDE 50 MG/ML
50 INJECTION, SOLUTION INTRAMUSCULAR; INTRAVENOUS AS NEEDED
Status: CANCELLED
Start: 2022-08-30

## 2022-08-19 RX ORDER — SODIUM CHLORIDE 9 MG/ML
10 INJECTION INTRAMUSCULAR; INTRAVENOUS; SUBCUTANEOUS AS NEEDED
Status: CANCELLED | OUTPATIENT
Start: 2022-08-30

## 2022-08-19 RX ORDER — HEPARIN 100 UNIT/ML
300-500 SYRINGE INTRAVENOUS AS NEEDED
Status: CANCELLED
Start: 2022-08-30

## 2022-08-19 RX ORDER — ONDANSETRON 2 MG/ML
8 INJECTION INTRAMUSCULAR; INTRAVENOUS AS NEEDED
Status: CANCELLED | OUTPATIENT
Start: 2022-08-30

## 2022-08-22 NOTE — PROGRESS NOTES
Pre chemo appointment for C6 Taxol/Carbo at [facility] on 8/30/2022    1. Have you been to the ER, urgent care clinic since your last visit? Hospitalized since your last visit?  no    2. Have you seen or consulted any other health care providers outside of the [address] [place] [address] since your last visit? Include any pap smears or colon screening.    no

## 2022-08-24 ENCOUNTER — TELEPHONE (OUTPATIENT)
Dept: GYNECOLOGY | Age: 67
End: 2022-08-24

## 2022-08-24 ENCOUNTER — OFFICE VISIT (OUTPATIENT)
Dept: GYNECOLOGY | Age: 67
End: 2022-08-24
Payer: MEDICARE

## 2022-08-24 VITALS
SYSTOLIC BLOOD PRESSURE: 132 MMHG | HEART RATE: 104 BPM | BODY MASS INDEX: 36.46 KG/M2 | WEIGHT: 205.8 LBS | DIASTOLIC BLOOD PRESSURE: 78 MMHG | HEIGHT: 63 IN

## 2022-08-24 DIAGNOSIS — Z79.899 ON ANTINEOPLASTIC CHEMOTHERAPY: ICD-10-CM

## 2022-08-24 DIAGNOSIS — C54.1 CARCINOSARCOMA OF ENDOMETRIUM (HCC): ICD-10-CM

## 2022-08-24 DIAGNOSIS — C54.1 ENDOMETRIAL CANCER (HCC): Primary | ICD-10-CM

## 2022-08-24 PROCEDURE — G8432 DEP SCR NOT DOC, RNG: HCPCS | Performed by: OBSTETRICS & GYNECOLOGY

## 2022-08-24 PROCEDURE — G8536 NO DOC ELDER MAL SCRN: HCPCS | Performed by: OBSTETRICS & GYNECOLOGY

## 2022-08-24 PROCEDURE — 1123F ACP DISCUSS/DSCN MKR DOCD: CPT | Performed by: OBSTETRICS & GYNECOLOGY

## 2022-08-24 PROCEDURE — G0463 HOSPITAL OUTPT CLINIC VISIT: HCPCS | Performed by: OBSTETRICS & GYNECOLOGY

## 2022-08-24 PROCEDURE — G8400 PT W/DXA NO RESULTS DOC: HCPCS | Performed by: OBSTETRICS & GYNECOLOGY

## 2022-08-24 PROCEDURE — 1090F PRES/ABSN URINE INCON ASSESS: CPT | Performed by: OBSTETRICS & GYNECOLOGY

## 2022-08-24 PROCEDURE — G8417 CALC BMI ABV UP PARAM F/U: HCPCS | Performed by: OBSTETRICS & GYNECOLOGY

## 2022-08-24 PROCEDURE — G8427 DOCREV CUR MEDS BY ELIG CLIN: HCPCS | Performed by: OBSTETRICS & GYNECOLOGY

## 2022-08-24 PROCEDURE — 99215 OFFICE O/P EST HI 40 MIN: CPT | Performed by: OBSTETRICS & GYNECOLOGY

## 2022-08-24 PROCEDURE — 1101F PT FALLS ASSESS-DOCD LE1/YR: CPT | Performed by: OBSTETRICS & GYNECOLOGY

## 2022-08-24 PROCEDURE — 3017F COLORECTAL CA SCREEN DOC REV: CPT | Performed by: OBSTETRICS & GYNECOLOGY

## 2022-08-24 NOTE — PROGRESS NOTES
27 [facility] [address], [address]  P [phone]  F [phone]    Office Note  Patient ID:  Name:  Zulay Salas  MRN:  [id number]  :  1955/67 y.o. Date:  2022      HISTORY OF PRESENT ILLNESS:  Ms. Zulay Salas is a 69 y.o. 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 On 2022 underwent Robotic-assisted total laparoscopic hysterectomy, Bilateral salpingo-oophorectomy, Bilateral pelvic sentinel lymph node mapping and biopsy. 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 4mm out of 15 myometrial invasion. Negative washings. Negative SLNs. Negative CT C/A/P 3/23/2022. Initiated on adjuvant carboplatin AUC 6 + paclitaxel 175mg/m2 on 2022. Presents today for consideration of cycle 6. Tolerated treatment well overall. Reporting some neuropathy in her feet. Initial History:  Ms. Zulay Salas is a 69 y.o.  postmenopausal female who presents as a new patient from Dr. Sanjana Wright for carcinosarcoma of the endometrium. The patient reported vaginal spotting/bleeding in February (). She underwent a pelvic ultrasound and endometrial biopsy with Dr. Sanjana Wright, which ultimately demonstrated carcinosarcoma. The patient denies pelvic or abdominal pain/bloating. Denies CP or SOB. Denies hematuria or hematochezia. She is without any other complaints. Pathology Review:   2022:  * * *FINAL PATHOLOGIC DIAGNOSIS* * *   1. Right pelvic sentinel lymph node, sentinel lymph node biopsy:        One benign lymph node, no tumor seen (0/1)   2. Left pelvic sentinel lymph node, sentinel lymph node biopsy:        Two benign lymph nodes, no tumor seen (0/2)   3.   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 (focal chondroid elements) with high grade epithelial component,   high grade serous carcinoma and minor component of clear cell carcinoma   (<5%)   Vascular invasion is identified   Mild chronic cervicitis, no tumor seen   Benign endocervical polyp with adjacent detached fragments carcinosarcoma   (See comment)   Bilateral ovaries, no tumor seen   Bilateral fallopian tubes, no tumor seen   Leiomyoma uteri   ENDOMETRIUM    SPECIMEN       Procedure: Total hysterectomy and bilateral salpingo-oophorectomy   TUMOR       Histologic Type: Carcinosarcoma (malignant mixed Müllerian tumor) with   high grade serous    carcinoma, and minor component of clear cell carcinoma       Histologic Grade: High Grade       Myometrial Invasion: Cannot be determined with certainty: The sample   is received in         pieces, so definitive depth of invasion cannot be rendered. The   deepest myometrial invasion of the         intact pieces of uterus measures 4mm of 15mm       Uterine Serosa Involvement: Not identified       Cervical Stromal Involvement: Not identified       Other Tissue / Organ Involvement: Not identified       Lymphovascular Invasion: Not identified   MARGINS       Margins: Not applicable   LYMPH NODES       Lymph Node Status:  All lymph nodes negative for tumor cells           Total Number of Pelvic Nodes Examined:           3           Number of Pelvic [place] Nodes Examined:           3           Total Number of Para-aortic Nodes Examined:      0           Number of Para-aortic [place] Nodes Examined:      0    PATHOLOGIC STAGE CLASSIFICATION (pTNM, AJCC 8th Edition)       Primary Tumor (pT):                          pT1b (based on intact   pieces of uterus)       Regional Lymph Nodes Modifier:                (sn)       Regional Lymph Nodes (pN):                     pN0   * * *Comment* * *   Due to the fragmented nature of the specimen upon receipt in the   laboratory, the presence of detached fragments of tumor on the slide adjacent to sections of the endocervical polyp, are favored to represent   contamination, rather than true endocervical involvement. Immunohistochemical stains for pancytokeratin performed on the sentinel   lymph nodes (specimen #1(1A,1B,1F) and specimen #2(2A,2B, and 2C) are   negative for metastatic carcinoma cells. Additional immunohistochemical stains performed on a section of tumor   reveal the following staining pattern:   Estrogen Receptor: Negative   P63: Focal nuclear decoration of occasional basal epithelial cells   Desmin: Negative   Pankeratin(AE1/3): Diffuse epithelial cytoplasmic membrane decoration of   tumor cells   These findings support the diagnosis. 3/10/2022:  Specimen A: Endometrial biopsy: Features consistent with malignant mixed mesodermal tumor (carcinosarcoma) with an epithelial portion component of serous carcinoma. Imaging Review:   CT C/A/P 3/23/2022:  FINDINGS:   THYROID: No nodule. MEDIASTINUM: No mass or lymphadenopathy. KARL: No mass or lymphadenopathy. THORACIC AORTA: No dissection or aneurysm. MAIN PULMONARY ARTERY: Normal in caliber. TRACHEA/BRONCHI: Patent. ESOPHAGUS: No wall thickening or dilatation. HEART: Normal in size. PLEURA: No effusion or pneumothorax. LUNGS: No nodule, mass, or airspace disease. Incidentally noted is a 2 mm  subpleural nodule right upper lobe image 46 series 4  LIVER: No mass or biliary dilatation. There is hepatic steatosis  GALLBLADDER: There is a gallstone  SPLEEN: No mass. PANCREAS: No mass or ductal dilatation. ADRENALS: There is a 1 cm left adrenal nodule  KIDNEYS: No mass, calculus, or hydronephrosis. STOMACH: There is thickening of the body of the stomach most likely related to  incomplete distention  SMALL BOWEL: No dilatation or wall thickening. COLON: No dilatation or wall thickening. APPENDIX: Unremarkable. PERITONEUM: No ascites or pneumoperitoneum.   RETROPERITONEUM: No lymphadenopathy or aortic aneurysm. REPRODUCTIVE ORGANS: The endometrium is thickened measuring up to 5.3 cm. There  is suspicion of a 3.2 cm enhancing lesion in the anterior uterine myometrium and  a 2.8 cm enhancing lesion right uterine myometrium  URINARY BLADDER: Incompletely distended  BONES: No destructive bone lesion. There are small low densities at T1/T2  bilaterally consistent with small lateral meningocele. There is slight scoliosis  levoconvex lumbar spine with 3 mm of anterolisthesis of L4 on L5 and  degenerative changes mid to lower lumbar spine  ADDITIONAL COMMENTS: N/A  IMPRESSION  1. CT of the chest demonstrates no definite evidence of metastatic disease. 2. CT of the abdomen and pelvis demonstrates the endometrium to be thickened  measuring 5.3 cm. There is suspicion of 2 enhancing lesions in the uterine  myometrium. No adenopathy or mass is identified to suggest metastatic disease. There is a 1 cm nodule in the left adrenal gland which may represent a small  adenoma    Pelvic ultrasound 3/14/2022:  Impression: Multi-fibroid uterus. Possible fibroid versus polyp in uterine cavity. Ovaries are not visualized. ROS:  A comprehensive review of systems was negative except for that written in the History of Present Illness.  , 10 point ROS      ECOG ndGndrndanddndend:nd nd2nd Problem List:  Patient Active Problem List    Diagnosis Date Noted    On antineoplastic chemotherapy 05/24/2022    Endometrial cancer ([address] 04/07/2022    Carcinosarcoma of endometrium ([address] 03/22/2022    PMB (postmenopausal bleeding)     Obesity     Malignant neoplasm of uterus ([address] 03/10/2022     PMH:  Past Medical History:   Diagnosis Date    Arthritis 2005    Enlarged uterus     Malignant neoplasm of uterus ([address] 03/10/2022    endometrial biopsy done on 3/10/22 by Dr. Rodriguez Briceño    Obesity     PMB (postmenopausal bleeding)       PSH:  Past Surgical History:   Procedure Laterality Date    HX COLONOSCOPY      HX ORTHOPAEDIC Bilateral 2006/2017    knee replacement X2    HX OTHER SURGICAL  2016    repair of retina    HX TONSILLECTOMY      HX TUBAL LIGATION  1981    IR INSERT TUNL CVC W PORT OVER 5 YEARS  5/4/2022      Social History:  Social History     Tobacco Use    Smoking status: Never    Smokeless tobacco: Never   Substance Use Topics    Alcohol use: Yes     Alcohol/week: 7.0 standard drinks     Types: 7 Glasses of wine per week      Family History:  Family History   Problem Relation Age of Onset    Cancer Father         Bladder    Anesth Problems Neg Hx       Medications: (reviewed)  Current Outpatient Medications   Medication Sig    dexAMETHasone (DECADRON) 4 mg tablet Take 2 tablets with breakfast the day before chemo and for 2 days after chemo    ondansetron (ZOFRAN ODT) 4 mg disintegrating tablet 1 Tablet by SubLINGual route every eight (8) hours as needed for Nausea. Indications: nausea and vomiting caused by cancer drugs    lidocaine-prilocaine (EMLA) topical cream Apply small amount over port area and cover with band aid one hour before chemo    vit C/Zn gluc/herbal no. 325 (ELDERBERRY ZINC VIT C MM) by Mucous Membrane route daily. cholecalciferol, vitamin D3, (VITAMIN D3 PO) Take  by mouth. ubidecarenone (COQ-10 PO) Take  by mouth. KRILL OIL PO Take  by mouth. MULTIVITAMIN PO Take  by mouth. B.animalis,bifid,infantis,long (Probiotic 4X) 10-15 mg TbEC Take  by mouth. No current facility-administered medications for this visit. Allergies: (reviewed)  Allergies   Allergen Reactions    Pcn [Penicillins] Rash     Patient screened for any delayed non-IgE-mediated reaction to PCN. Patient notes the following:    No delayed non-IgE-mediated reaction to PCN    ALLERGY AT 13YEARS OF AGE-NO HOSPITALIZATION             OBJECTIVE:  *deferred today given video-conference visit for ongoing COVID-19 pandemic*   Physical Exam:  There were no vitals taken for this visit. General: Alert and oriented. No acute distress.  Well-nourished  HEENT: No thyroid enlargment. Neck supple without restrictions. Sclera normal. Normal occular motion. Moist mucous membranes. Lymphatics: No evidence of axillary, cervical, or subclavicular adenopathy. Respiratory: clear to auscultation and percussion to the bases. No CVAT. Cardiovascular: regular rate and rhythm. No murmurs, rubs, or gallops. Gastrointestinal: soft, non-tender, non-distended, no masses or organomegaly. Well-healed incision. Musculoskeletal: normal gait. No joint tenderness, deformity or swelling. No muscular tenderness. Extremities: extremities normal, atraumatic, no cyanosis or edema. Pelvic: exam chaperoned by nurse. Normal appearing external genitalia. On speculum exam, the vaginal cuff is intact and healing well. On bimanual, the uterus and cervix are surgically absent. Vaginal cuff intact without defect. No evidence of masses or nodularity on bimanual exam. Deferred rectovaginal exam.   Neuro: Grossly intact. Normal gait and movement. No acute deficit  Skin: No evidence of rashes or skin changes. Lab Date as available:    Lab Results   Component Value Date/Time    WBC 8.0 08/09/2022 10:15 AM    HGB 10.7 (L) 08/09/2022 10:15 AM    HCT 31.0 (L) 08/09/2022 10:15 AM    PLATELET 018 (L) 21/73/7922 10:15 AM    MCV 95.1 08/09/2022 10:15 AM     Lab Results   Component Value Date/Time    Sodium 140 08/09/2022 10:15 AM    Potassium 3.3 (L) 08/09/2022 10:15 AM    Chloride 108 08/09/2022 10:15 AM    CO2 24 08/09/2022 10:15 AM    Anion gap 8 08/09/2022 10:15 AM    Glucose 109 (H) 08/09/2022 10:15 AM    BUN 21 (H) 08/09/2022 10:15 AM    Creatinine 0.81 08/09/2022 10:15 AM    BUN/Creatinine ratio 26 (H) 08/09/2022 10:15 AM    GFR est AA >60 08/09/2022 10:15 AM    GFR est non-AA >60 08/09/2022 10:15 AM    Calcium 9.5 08/09/2022 10:15 AM         IMPRESSION/PLAN:    Ms. Aris Merino is a 69 y.o. 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  On 4/7/2022 underwent Robotic-assisted total laparoscopic hysterectomy, Bilateral salpingo-oophorectomy, Bilateral pelvic sentinel lymph node mapping and biopsy. 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 4mm out of 15 myometrial invasion. Negative washings. Negative SLNs. Negative CT C/A/P 3/23/2022. Initiated on adjuvant carboplatin AUC 6 + paclitaxel 175mg/m2 on 5/16/2022. Presents today for consideration of cycle 5. Problems:     Patient Active Problem List    Diagnosis Date Noted    On antineoplastic chemotherapy 05/24/2022    Endometrial cancer ([facility] 04/07/2022    Carcinosarcoma of endometrium ([facility] 03/22/2022    PMB (postmenopausal bleeding)     Obesity     Malignant neoplasm of uterus ([facility] 03/10/2022     Reviewed patient's course to date. Initiated on adjuvant carboplatin AUC 6 + paclitaxel 175mg/m2 on 5/16/2022. Presents today for consideration of cycle 6. Tolerating treatment well overall. Given neuropathy will decreased paclitaxel to 155mg/m2 for the last cycle. Per our prior discussion, based on her pathology being consistent with Stage Ia carcinosarcoma, the patient is considered a high-risk patient. I have recommended adjuvant chemotherapy, carboplatin AUC 6 + paclitaxel 175mg/m2, 6 cycles. I also discussed the role of possible VBT to follow her chemotherapy. Referral placed today to Radiation Oncology. Will follow-up pre-chemo labs. RTC in 1 month with CT C/A/P for end of treatment imaging. An electronic signature was used to authenticate this note.      Yasemin Ge MD

## 2022-08-27 LAB
ALBUMIN SERPL-MCNC: 4 G/DL (ref 3.8–4.8)
ALBUMIN/GLOB SERPL: 1.8 {RATIO} (ref 1.2–2.2)
ALP SERPL-CCNC: 55 IU/L (ref 44–121)
ALT SERPL-CCNC: 19 IU/L (ref 0–32)
AST SERPL-CCNC: 20 IU/L (ref 0–40)
BASOPHILS # BLD AUTO: 0 X10E3/UL (ref 0–0.2)
BASOPHILS NFR BLD AUTO: 1 %
BILIRUB SERPL-MCNC: 0.3 MG/DL (ref 0–1.2)
BUN SERPL-MCNC: 15 MG/DL (ref 8–27)
BUN/CREAT SERPL: 23 (ref 12–28)
CALCIUM SERPL-MCNC: 9.3 MG/DL (ref 8.7–10.3)
CANCER AG125 SERPL-ACNC: 14.8 U/ML (ref 0–38.1)
CHLORIDE SERPL-SCNC: 106 MMOL/L (ref 96–106)
CO2 SERPL-SCNC: 23 MMOL/L (ref 20–29)
CREAT SERPL-MCNC: 0.65 MG/DL (ref 0.57–1)
EGFR: 95 ML/MIN/1.73
EOSINOPHIL # BLD AUTO: 0 X10E3/UL (ref 0–0.4)
EOSINOPHIL NFR BLD AUTO: 0 %
ERYTHROCYTE [DISTWIDTH] IN BLOOD BY AUTOMATED COUNT: 16 % (ref 11.7–15.4)
GLOBULIN SER CALC-MCNC: 2.2 G/DL (ref 1.5–4.5)
GLUCOSE SERPL-MCNC: 113 MG/DL (ref 65–99)
HCT VFR BLD AUTO: 30.2 % (ref 34–46.6)
HGB BLD-MCNC: 10.6 G/DL (ref 11.1–15.9)
IMM GRANULOCYTES # BLD AUTO: 0 X10E3/UL (ref 0–0.1)
IMM GRANULOCYTES NFR BLD AUTO: 0 %
LYMPHOCYTES # BLD AUTO: 1.7 X10E3/UL (ref 0.7–3.1)
LYMPHOCYTES NFR BLD AUTO: 45 %
MAGNESIUM SERPL-MCNC: 1.5 MG/DL (ref 1.6–2.3)
MCH RBC QN AUTO: 33.9 PG (ref 26.6–33)
MCHC RBC AUTO-ENTMCNC: 35.1 G/DL (ref 31.5–35.7)
MCV RBC AUTO: 97 FL (ref 79–97)
MONOCYTES # BLD AUTO: 0.4 X10E3/UL (ref 0.1–0.9)
MONOCYTES NFR BLD AUTO: 10 %
NEUTROPHILS # BLD AUTO: 1.7 X10E3/UL (ref 1.4–7)
NEUTROPHILS NFR BLD AUTO: 44 %
PLATELET # BLD AUTO: 173 X10E3/UL (ref 150–450)
POTASSIUM SERPL-SCNC: 4.3 MMOL/L (ref 3.5–5.2)
PROT SERPL-MCNC: 6.2 G/DL (ref 6–8.5)
RBC # BLD AUTO: 3.13 X10E6/UL (ref 3.77–5.28)
SODIUM SERPL-SCNC: 143 MMOL/L (ref 134–144)
WBC # BLD AUTO: 3.8 X10E3/UL (ref 3.4–10.8)

## 2022-08-30 ENCOUNTER — HOSPITAL ENCOUNTER (OUTPATIENT)
Dept: INFUSION THERAPY | Age: 67
Discharge: HOME | End: 2022-08-30
Payer: MEDICARE

## 2022-08-30 VITALS
OXYGEN SATURATION: 94 % | RESPIRATION RATE: 20 BRPM | DIASTOLIC BLOOD PRESSURE: 83 MMHG | WEIGHT: 206.4 LBS | SYSTOLIC BLOOD PRESSURE: 141 MMHG | HEART RATE: 63 BPM | TEMPERATURE: 97.4 F | HEIGHT: 63 IN | BODY MASS INDEX: 36.57 KG/M2

## 2022-08-30 DIAGNOSIS — C54.1 ENDOMETRIAL CANCER (HCC): Primary | ICD-10-CM

## 2022-08-30 PROCEDURE — 74011250636 HC RX REV CODE- 250/636: Performed by: OBSTETRICS & GYNECOLOGY

## 2022-08-30 PROCEDURE — 74011000258 HC RX REV CODE- 258: Performed by: OBSTETRICS & GYNECOLOGY

## 2022-08-30 PROCEDURE — 96417 CHEMO IV INFUS EACH ADDL SEQ: CPT

## 2022-08-30 PROCEDURE — 96415 CHEMO IV INFUSION ADDL HR: CPT

## 2022-08-30 PROCEDURE — 96413 CHEMO IV INFUSION 1 HR: CPT

## 2022-08-30 PROCEDURE — 74011000250 HC RX REV CODE- 250: Performed by: OBSTETRICS & GYNECOLOGY

## 2022-08-30 PROCEDURE — 74011250636 HC RX REV CODE- 250/636: Performed by: PHYSICIAN ASSISTANT

## 2022-08-30 PROCEDURE — 96375 TX/PRO/DX INJ NEW DRUG ADDON: CPT

## 2022-08-30 PROCEDURE — 0662T HC SCALP COOL 1ST MEAS&CALBRJ: CPT

## 2022-08-30 PROCEDURE — 96367 TX/PROPH/DG ADDL SEQ IV INF: CPT

## 2022-08-30 PROCEDURE — 77030012965 HC NDL HUBR BBMI -A

## 2022-08-30 RX ORDER — PALONOSETRON 0.05 MG/ML
0.25 INJECTION, SOLUTION INTRAVENOUS ONCE
Status: COMPLETED | OUTPATIENT
Start: 2022-08-30 | End: 2022-08-30

## 2022-08-30 RX ORDER — DIPHENHYDRAMINE HYDROCHLORIDE 50 MG/ML
50 INJECTION, SOLUTION INTRAMUSCULAR; INTRAVENOUS ONCE
Status: COMPLETED | OUTPATIENT
Start: 2022-08-30 | End: 2022-08-30

## 2022-08-30 RX ORDER — SODIUM CHLORIDE 9 MG/ML
25 INJECTION, SOLUTION INTRAVENOUS CONTINUOUS
Status: DISPENSED | OUTPATIENT
Start: 2022-08-30 | End: 2022-08-30

## 2022-08-30 RX ADMIN — SODIUM CHLORIDE 150 MG: 900 INJECTION, SOLUTION INTRAVENOUS at 11:00

## 2022-08-30 RX ADMIN — DEXAMETHASONE SODIUM PHOSPHATE 12 MG: 4 INJECTION, SOLUTION INTRAMUSCULAR; INTRAVENOUS at 10:40

## 2022-08-30 RX ADMIN — FAMOTIDINE 20 MG: 10 INJECTION, SOLUTION INTRAVENOUS at 10:19

## 2022-08-30 RX ADMIN — CARBOPLATIN 645 MG: 10 INJECTION, SOLUTION INTRAVENOUS at 14:42

## 2022-08-30 RX ADMIN — SODIUM CHLORIDE 25 ML/HR: 9 INJECTION, SOLUTION INTRAVENOUS at 10:16

## 2022-08-30 RX ADMIN — PACLITAXEL 319 MG: 6 INJECTION, SOLUTION INTRAVENOUS at 11:39

## 2022-08-30 RX ADMIN — PALONOSETRON 0.25 MG: 0.05 INJECTION, SOLUTION INTRAVENOUS at 10:21

## 2022-08-30 RX ADMIN — DIPHENHYDRAMINE HYDROCHLORIDE 50 MG: 50 INJECTION INTRAMUSCULAR; INTRAVENOUS at 10:22

## 2022-08-30 NOTE — PROGRESS NOTES
hospitals Progress Note    Date: 2022    Name: Rosalinda Landrum    MRN: [id number]         : 1955    Ms. Vicente Arrived ambulatory and in no distress for cycle 6 day 1 of Taxol/Carboplatin regimen. Assessment was completed, no acute issues at this time, no new complaints voiced. Port accessed without difficulty, labs drawn 22 were used for todays' chemo    Ms. Vicente's vitals were reviewed. Visit Vitals  BP (!) 141/83   Pulse 63   Temp 97.4 °F (36.3 °C)   Resp 20   Ht 5' 3\" (1.6 m)   Wt 93.6 kg (206 lb 6.4 oz)   SpO2 94%   BMI 36.56 kg/m²       Lab results were obtained and reviewed. No results found for this or any previous visit (from the past 12 hour(s)). Pre-medications  were administered as ordered and chemotherapy was initiated.      Medications Administered       0.9% sodium chloride infusion       Admin Date  2022 Action  New Bag Dose  25 mL/hr Rate  25 mL/hr Route  IntraVENous Administered By  Mary Christensen RN              CARBOplatin (PARAPLATIN) 645 mg in 0.9% sodium chloride 250 mL, overfill volume 25 mL chemo infusion (GOG)       Admin Date  2022 Action  New Bag Dose  645 mg Rate  679 mL/hr Route  IntraVENous Administered By  Mary Christensen RN              dexamethasone (DECADRON) 12 mg in 0.9% sodium chloride 50 mL, overfill volume 5 mL IVPB       Admin Date  2022 Action  New Bag Dose  12 mg Rate  232 mL/hr Route  IntraVENous Administered By  Mary Christensen RN              diphenhydrAMINE (BENADRYL) injection 50 mg       Admin Date  2022 Action  Given Dose  50 mg Route  IntraVENous Administered By  Mary Christensen RN              famotidine (PF) (PEPCID) 20 mg in 0.9% sodium chloride 10 mL injection       Admin Date  2022 Action  Given Dose  20 mg Route  IntraVENous Administered By  Mary Christensen RN              fosaprepitant (EMEND) 150 mg in 0.9% sodium chloride 150 mL IVPB       Admin Date  2022 Action  New Bag Dose  150 mg Rate  450 mL/hr Route  IntraVENous Administered By  Cesar Lake RN              PACLitaxeL (TAXOL) 319 mg in 0.9% sodium chloride 250 mL, overfill volume 25 mL chemo infusion       Admin Date  08/30/2022 Action  New Bag Dose  319 mg Rate  109.4 mL/hr Route  IntraVENous Administered By  Cesar Lake RN              palonosetron HCl (ALOXI) injection 0.25 mg       Admin Date  08/30/2022 Action  Given Dose  0.25 mg Route  IntraVENous Administered By  Cesar Lake RN                       Ms. Serg Gamboa tolerated treatment well and was discharged from [place] [address] in stable condition at [id number].  Patient rang the bell as this was her last planned infusion Serene Prior, RN  August 30, 2022

## 2022-09-15 ENCOUNTER — TELEPHONE (OUTPATIENT)
Dept: GYNECOLOGY | Age: 67
End: 2022-09-15

## 2022-09-21 RX ORDER — SODIUM CHLORIDE 9 MG/ML
5-250 INJECTION, SOLUTION INTRAVENOUS AS NEEDED
Status: CANCELLED | OUTPATIENT
Start: 2022-09-26

## 2022-09-21 RX ORDER — SODIUM CHLORIDE 0.9 % (FLUSH) 0.9 %
5-40 SYRINGE (ML) INJECTION AS NEEDED
Status: CANCELLED | OUTPATIENT
Start: 2022-09-26

## 2022-09-21 RX ORDER — HEPARIN 100 UNIT/ML
500 SYRINGE INTRAVENOUS AS NEEDED
Status: CANCELLED
Start: 2022-09-26

## 2022-09-21 RX ORDER — SODIUM CHLORIDE 9 MG/ML
5-40 INJECTION INTRAMUSCULAR; INTRAVENOUS; SUBCUTANEOUS AS NEEDED
Status: CANCELLED | OUTPATIENT
Start: 2022-09-26

## 2022-09-26 ENCOUNTER — HOSPITAL ENCOUNTER (OUTPATIENT)
Dept: CT IMAGING | Age: 67
Discharge: HOME | End: 2022-09-26
Attending: OBSTETRICS & GYNECOLOGY
Payer: MEDICARE

## 2022-09-26 ENCOUNTER — HOSPITAL ENCOUNTER (OUTPATIENT)
Dept: INFUSION THERAPY | Age: 67
Discharge: HOME | End: 2022-09-26
Payer: MEDICARE

## 2022-09-26 VITALS
RESPIRATION RATE: 16 BRPM | HEART RATE: 82 BPM | SYSTOLIC BLOOD PRESSURE: 149 MMHG | DIASTOLIC BLOOD PRESSURE: 87 MMHG | WEIGHT: 206 LBS | TEMPERATURE: 97.7 F | BODY MASS INDEX: 36.49 KG/M2 | OXYGEN SATURATION: 99 %

## 2022-09-26 DIAGNOSIS — C54.1 CARCINOSARCOMA OF ENDOMETRIUM (HCC): ICD-10-CM

## 2022-09-26 DIAGNOSIS — C54.1 ENDOMETRIAL CANCER (HCC): ICD-10-CM

## 2022-09-26 DIAGNOSIS — Z79.899 ON ANTINEOPLASTIC CHEMOTHERAPY: ICD-10-CM

## 2022-09-26 DIAGNOSIS — C54.1 MALIGNANT NEOPLASM OF ENDOMETRIUM (HCC): Primary | ICD-10-CM

## 2022-09-26 PROCEDURE — 96523 IRRIG DRUG DELIVERY DEVICE: CPT

## 2022-09-26 PROCEDURE — 77030012965 HC NDL HUBR BBMI -A

## 2022-09-26 PROCEDURE — 74011000636 HC RX REV CODE- 636: Performed by: OBSTETRICS & GYNECOLOGY

## 2022-09-26 PROCEDURE — 71260 CT THORAX DX C+: CPT

## 2022-09-26 PROCEDURE — 74011250636 HC RX REV CODE- 250/636: Performed by: OBSTETRICS & GYNECOLOGY

## 2022-09-26 PROCEDURE — 74177 CT ABD & PELVIS W/CONTRAST: CPT

## 2022-09-26 PROCEDURE — 74011000250 HC RX REV CODE- 250: Performed by: OBSTETRICS & GYNECOLOGY

## 2022-09-26 RX ORDER — SODIUM CHLORIDE 0.9 % (FLUSH) 0.9 %
5-40 SYRINGE (ML) INJECTION AS NEEDED
Status: DISPENSED | OUTPATIENT
Start: 2022-09-26 | End: 2022-09-26

## 2022-09-26 RX ORDER — BARIUM SULFATE 20 MG/ML
900 SUSPENSION ORAL
Status: DISCONTINUED | OUTPATIENT
Start: 2022-09-26 | End: 2022-09-26

## 2022-09-26 RX ORDER — SODIUM CHLORIDE 9 MG/ML
5-250 INJECTION, SOLUTION INTRAVENOUS AS NEEDED
Status: DISPENSED | OUTPATIENT
Start: 2022-09-26 | End: 2022-09-26

## 2022-09-26 RX ORDER — SODIUM CHLORIDE 9 MG/ML
5-40 INJECTION INTRAMUSCULAR; INTRAVENOUS; SUBCUTANEOUS AS NEEDED
Status: ACTIVE | OUTPATIENT
Start: 2022-09-26 | End: 2022-09-26

## 2022-09-26 RX ORDER — HEPARIN 100 UNIT/ML
500 SYRINGE INTRAVENOUS AS NEEDED
Status: ACTIVE | OUTPATIENT
Start: 2022-09-26 | End: 2022-09-26

## 2022-09-26 RX ADMIN — HEPARIN SODIUM (PORCINE) LOCK FLUSH IV SOLN 100 UNIT/ML 500 UNITS: 100 SOLUTION at 11:12

## 2022-09-26 RX ADMIN — SODIUM CHLORIDE, PRESERVATIVE FREE 10 ML: 5 INJECTION INTRAVENOUS at 11:12

## 2022-09-26 RX ADMIN — SODIUM CHLORIDE, PRESERVATIVE FREE 10 ML: 5 INJECTION INTRAVENOUS at 11:11

## 2022-09-26 RX ADMIN — IOPAMIDOL 100 ML: 755 INJECTION, SOLUTION INTRAVENOUS at 12:01

## 2022-09-26 NOTE — PROGRESS NOTES
[facility] Visit Note    1100 Pt arrived at [facility] ambulatory and in no distress for port flush. No new complaints voiced. Port accessed per protocol with positive blood return, flushed and de-accessed. Patient denied having any symptoms of COVID-19, i.e. SOB, coughing, fever, or generally not feeling well. Also denies having been exposed to COVID-19 recently or having had any recent contact with family/friend that has a pending COVID test.     Patient Vitals for the past 12 hrs:   Temp Pulse Resp BP SpO2   09/26/22 1100 97.7 °F (36.5 °C) 82 16 (!) 149/87 99 %          Medications received:  Medications Administered       0.9% sodium chloride injection 5-40 mL       Admin Date  09/26/2022 Action  Given Dose  10 mL Route  IntraVENous Administered By  Jayshree Mckeon RN               Admin Date  09/26/2022 Action  Given Dose  10 mL Route  IntraVENous Administered By  Jayshree Mckeon RN              heparin (porcine) pf 500 Units       Admin Date  09/26/2022 Action  Given Dose  500 Units Route  InterCATHeter Administered By  Jayshree Mckeon RN                      1114 Tolerated treatment well, no adverse reaction noted. D/Cd from [facility] ambulatory and in no distress accompanied by family.   Next appt 10/24

## 2022-09-26 NOTE — PROGRESS NOTES
Consult, CT scan results from 9/26/22    1. Have you been to the ER, urgent care clinic since your last visit? Hospitalized since your last visit?  no    2. Have you seen or consulted any other health care providers outside of the [address] [place] [address] since your last visit? Include any pap smears or colon screening.    no

## 2022-09-27 ENCOUNTER — OFFICE VISIT (OUTPATIENT)
Dept: GYNECOLOGY | Age: 67
End: 2022-09-27
Payer: MEDICARE

## 2022-09-27 VITALS
HEIGHT: 63 IN | BODY MASS INDEX: 36.5 KG/M2 | WEIGHT: 206 LBS | SYSTOLIC BLOOD PRESSURE: 121 MMHG | HEART RATE: 85 BPM | DIASTOLIC BLOOD PRESSURE: 87 MMHG

## 2022-09-27 DIAGNOSIS — Z79.899 ON ANTINEOPLASTIC CHEMOTHERAPY: ICD-10-CM

## 2022-09-27 DIAGNOSIS — C54.1 CARCINOSARCOMA OF ENDOMETRIUM (HCC): ICD-10-CM

## 2022-09-27 DIAGNOSIS — C54.1 MALIGNANT NEOPLASM OF ENDOMETRIUM (HCC): Primary | ICD-10-CM

## 2022-09-27 PROCEDURE — 1101F PT FALLS ASSESS-DOCD LE1/YR: CPT | Performed by: OBSTETRICS & GYNECOLOGY

## 2022-09-27 PROCEDURE — G8417 CALC BMI ABV UP PARAM F/U: HCPCS | Performed by: OBSTETRICS & GYNECOLOGY

## 2022-09-27 PROCEDURE — G8536 NO DOC ELDER MAL SCRN: HCPCS | Performed by: OBSTETRICS & GYNECOLOGY

## 2022-09-27 PROCEDURE — G0463 HOSPITAL OUTPT CLINIC VISIT: HCPCS | Performed by: OBSTETRICS & GYNECOLOGY

## 2022-09-27 PROCEDURE — G8427 DOCREV CUR MEDS BY ELIG CLIN: HCPCS | Performed by: OBSTETRICS & GYNECOLOGY

## 2022-09-27 PROCEDURE — 3017F COLORECTAL CA SCREEN DOC REV: CPT | Performed by: OBSTETRICS & GYNECOLOGY

## 2022-09-27 PROCEDURE — 1090F PRES/ABSN URINE INCON ASSESS: CPT | Performed by: OBSTETRICS & GYNECOLOGY

## 2022-09-27 PROCEDURE — G8400 PT W/DXA NO RESULTS DOC: HCPCS | Performed by: OBSTETRICS & GYNECOLOGY

## 2022-09-27 PROCEDURE — 99214 OFFICE O/P EST MOD 30 MIN: CPT | Performed by: OBSTETRICS & GYNECOLOGY

## 2022-09-27 PROCEDURE — 1123F ACP DISCUSS/DSCN MKR DOCD: CPT | Performed by: OBSTETRICS & GYNECOLOGY

## 2022-09-27 PROCEDURE — G8432 DEP SCR NOT DOC, RNG: HCPCS | Performed by: OBSTETRICS & GYNECOLOGY

## 2022-09-27 RX ORDER — SODIUM CHLORIDE 0.9 % (FLUSH) 0.9 %
5-40 SYRINGE (ML) INJECTION AS NEEDED
Status: CANCELLED | OUTPATIENT
Start: 2022-10-24

## 2022-09-27 RX ORDER — SODIUM CHLORIDE 9 MG/ML
5-250 INJECTION, SOLUTION INTRAVENOUS AS NEEDED
Status: CANCELLED | OUTPATIENT
Start: 2022-10-24

## 2022-09-27 RX ORDER — SODIUM CHLORIDE 9 MG/ML
5-40 INJECTION INTRAMUSCULAR; INTRAVENOUS; SUBCUTANEOUS AS NEEDED
Status: CANCELLED | OUTPATIENT
Start: 2022-10-24

## 2022-09-27 RX ORDER — HEPARIN 100 UNIT/ML
500 SYRINGE INTRAVENOUS AS NEEDED
Status: CANCELLED
Start: 2022-10-24

## 2022-09-27 NOTE — PROGRESS NOTES
[facility] [address] [facility] [place] [phone]  F [phone]    Office Note  Patient ID:  Name:  Sofie Alcazar  MRN:  [id number]  :  1955/67 y.o. Date:  2022      HISTORY OF PRESENT ILLNESS:  Ms. Sofie Alcazar is a 69 y.o. 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 On 2022 underwent Robotic-assisted total laparoscopic hysterectomy, Bilateral salpingo-oophorectomy, Bilateral pelvic sentinel lymph node mapping and biopsy. 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 4mm out of 15 myometrial invasion. Negative washings. Negative SLNs. Negative CT C/A/P 3/23/2022. Initiated on adjuvant carboplatin AUC 6 + paclitaxel 175mg/m2 on 2022. Completed cycle 6 on 2022. Tolerated treatment well overall. Reporting some neuropathy in her feet. This is worst in her left foot. Initial History:  Ms. Sofie Alcazar is a 69 y.o.  postmenopausal female who presents as a new patient from Dr. Luis Isabel for carcinosarcoma of the endometrium. The patient reported vaginal spotting/bleeding in February (). She underwent a pelvic ultrasound and endometrial biopsy with Dr. Luis Isabel, which ultimately demonstrated carcinosarcoma. The patient denies pelvic or abdominal pain/bloating. Denies CP or SOB. Denies hematuria or hematochezia. She is without any other complaints. Pathology Review:   2022:  * * *FINAL PATHOLOGIC DIAGNOSIS* * *   1. Right pelvic sentinel lymph node, sentinel lymph node biopsy:        One benign lymph node, no tumor seen (0/1)   2. Left pelvic sentinel lymph node, sentinel lymph node biopsy:        Two benign lymph nodes, no tumor seen (0/2)   3.   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 (focal chondroid elements) with high grade epithelial component,   high grade serous carcinoma and minor component of clear cell carcinoma   (<5%)   Vascular invasion is identified   Mild chronic cervicitis, no tumor seen   Benign endocervical polyp with adjacent detached fragments carcinosarcoma   (See comment)   Bilateral ovaries, no tumor seen   Bilateral fallopian tubes, no tumor seen   Leiomyoma uteri   ENDOMETRIUM    SPECIMEN       Procedure: Total hysterectomy and bilateral salpingo-oophorectomy   TUMOR       Histologic Type: Carcinosarcoma (malignant mixed Müllerian tumor) with   high grade serous    carcinoma, and minor component of clear cell carcinoma       Histologic Grade: High Grade       Myometrial Invasion: Cannot be determined with certainty: The sample   is received in         pieces, so definitive depth of invasion cannot be rendered. The   deepest myometrial invasion of the         intact pieces of uterus measures 4mm of 15mm       Uterine Serosa Involvement: Not identified       Cervical Stromal Involvement: Not identified       Other Tissue / Organ Involvement: Not identified       Lymphovascular Invasion: Not identified   MARGINS       Margins: Not applicable   LYMPH NODES       Lymph Node Status:  All lymph nodes negative for tumor cells           Total Number of Pelvic Nodes Examined:           3           Number of Pelvic [place] Nodes Examined:           3           Total Number of Para-aortic Nodes Examined:      0           Number of Para-aortic [place] Nodes Examined:      0    PATHOLOGIC STAGE CLASSIFICATION (pTNM, AJCC 8th Edition)       Primary Tumor (pT):                          pT1b (based on intact   pieces of uterus)       Regional Lymph Nodes Modifier:                (sn)       Regional Lymph Nodes (pN):                     pN0   * * *Comment* * *   Due to the fragmented nature of the specimen upon receipt in the   laboratory, the presence of detached fragments of tumor on the slide   adjacent to sections of the endocervical polyp, are favored to represent   contamination, rather than true endocervical involvement. Immunohistochemical stains for pancytokeratin performed on the sentinel   lymph nodes (specimen #1(1A,1B,1F) and specimen #2(2A,2B, and 2C) are   negative for metastatic carcinoma cells. Additional immunohistochemical stains performed on a section of tumor   reveal the following staining pattern:   Estrogen Receptor: Negative   P63: Focal nuclear decoration of occasional basal epithelial cells   Desmin: Negative   Pankeratin(AE1/3): Diffuse epithelial cytoplasmic membrane decoration of   tumor cells   These findings support the diagnosis. 3/10/2022:  Specimen A: Endometrial biopsy: Features consistent with malignant mixed mesodermal tumor (carcinosarcoma) with an epithelial portion component of serous carcinoma. Imaging Review:  CT C/A/P 9/26/2022:  FINDINGS:  There is a right chest portacatheter. THYROID: No nodule. MEDIASTINUM: No mass or lymphadenopathy. KARL: No mass or lymphadenopathy. THORACIC AORTA: No aneurysm. MAIN PULMONARY ARTERY: Normal in caliber. HEART: Normal in size. ESOPHAGUS: No wall thickening or dilatation. TRACHEA/BRONCHI: Patent. PLEURA: No effusion or pneumothorax. LUNGS: No nodule, mass, or airspace disease. Liver: No mass or biliary dilatation. Biliary tree: There is a peripherally calcified gallstone within the gallbladder  measuring 3 cm. The CBD is not dilated. Spleen: Within normal limits. Pancreas: No inflammation, mass or ductal dilatation. Adrenals: Unremarkable. Kidneys: No mass or hydronephrosis. Stomach: Unremarkable. Small bowel: No dilatation or wall thickening. Colon: Diverticulosis of the colon, with no evidence of acute diverticulitis. Appendix: Normal  Peritoneum: No ascites or pneumoperitoneum. Retroperitoneum: No lymphadenopathy or aortic aneurysm.  Diffuse calcific aortic  atherosclerosis. Reproductive organs: Status post hysterectomy. No pelvic free fluid or mass. Urinary bladder: No mass or calculus. Bones: No destructive bone lesion. Abdominal wall: No mass or hernia. Additional comments: N/A  IMPRESSION  1. Expected postsurgical changes of interval hysterectomy. 2. No evidence of metastatic disease within the chest, abdomen, or pelvis. 3. Cholelithiasis. 4. Diverticulosis of the colon. CT C/A/P 3/23/2022:  FINDINGS:   THYROID: No nodule. MEDIASTINUM: No mass or lymphadenopathy. KARL: No mass or lymphadenopathy. THORACIC AORTA: No dissection or aneurysm. MAIN PULMONARY ARTERY: Normal in caliber. TRACHEA/BRONCHI: Patent. ESOPHAGUS: No wall thickening or dilatation. HEART: Normal in size. PLEURA: No effusion or pneumothorax. LUNGS: No nodule, mass, or airspace disease. Incidentally noted is a 2 mm  subpleural nodule right upper lobe image 46 series 4  LIVER: No mass or biliary dilatation. There is hepatic steatosis  GALLBLADDER: There is a gallstone  SPLEEN: No mass. PANCREAS: No mass or ductal dilatation. ADRENALS: There is a 1 cm left adrenal nodule  KIDNEYS: No mass, calculus, or hydronephrosis. STOMACH: There is thickening of the body of the stomach most likely related to  incomplete distention  SMALL BOWEL: No dilatation or wall thickening. COLON: No dilatation or wall thickening. APPENDIX: Unremarkable. PERITONEUM: No ascites or pneumoperitoneum. RETROPERITONEUM: No lymphadenopathy or aortic aneurysm. REPRODUCTIVE ORGANS: The endometrium is thickened measuring up to 5.3 cm. There  is suspicion of a 3.2 cm enhancing lesion in the anterior uterine myometrium and  a 2.8 cm enhancing lesion right uterine myometrium  URINARY BLADDER: Incompletely distended  BONES: No destructive bone lesion. There are small low densities at T1/T2  bilaterally consistent with small lateral meningocele.  There is slight scoliosis  levoconvex lumbar spine with 3 mm of anterolisthesis of L4 on L5 and  degenerative changes mid to lower lumbar spine  ADDITIONAL COMMENTS: N/A  IMPRESSION  1. CT of the chest demonstrates no definite evidence of metastatic disease. 2. CT of the abdomen and pelvis demonstrates the endometrium to be thickened  measuring 5.3 cm. There is suspicion of 2 enhancing lesions in the uterine  myometrium. No adenopathy or mass is identified to suggest metastatic disease. There is a 1 cm nodule in the left adrenal gland which may represent a small  adenoma    Pelvic ultrasound 3/14/2022:  Impression: Multi-fibroid uterus. Possible fibroid versus polyp in uterine cavity. Ovaries are not visualized. ROS:  A comprehensive review of systems was negative except for that written in the History of Present Illness. , 10 point ROS      ECOG ndGndrndanddndend:nd nd2nd Problem List:  Patient Active Problem List    Diagnosis Date Noted    On antineoplastic chemotherapy 05/24/2022    Endometrial cancer ([address] 04/07/2022    Carcinosarcoma of endometrium ([address] 03/22/2022    PMB (postmenopausal bleeding)     Obesity     Malignant neoplasm of uterus ([address] 03/10/2022     PMH:  Past Medical History:   Diagnosis Date    Arthritis 2005    Enlarged uterus     Malignant neoplasm of uterus ([address] 03/10/2022    endometrial biopsy done on 3/10/22 by Dr. Albert Lentz    Obesity     PMB (postmenopausal bleeding)       PSH:  Past Surgical History:   Procedure Laterality Date    HX COLONOSCOPY      HX ORTHOPAEDIC Bilateral 2006/2017    knee replacement X2    HX OTHER SURGICAL  2016    repair of retina    HX TONSILLECTOMY      HX TUBAL LIGATION  1981    IR INSERT TUNL CVC W PORT OVER 5 YEARS  5/4/2022      Social History:  Social History     Tobacco Use    Smoking status: Never    Smokeless tobacco: Never   Substance Use Topics    Alcohol use:  Yes     Alcohol/week: 7.0 standard drinks     Types: 7 Glasses of wine per week      Family History:  Family History   Problem Relation Age of Onset    Cancer Father         Bladder    Anesth Problems Neg Hx       Medications: (reviewed)  Current Outpatient Medications   Medication Sig    dexAMETHasone (DECADRON) 4 mg tablet Take 2 tablets with breakfast the day before chemo and for 2 days after chemo    ondansetron (ZOFRAN ODT) 4 mg disintegrating tablet 1 Tablet by SubLINGual route every eight (8) hours as needed for Nausea. Indications: nausea and vomiting caused by cancer drugs    lidocaine-prilocaine (EMLA) topical cream Apply small amount over port area and cover with band aid one hour before chemo    vit C/Zn gluc/herbal no. 325 (ELDERBERRY ZINC VIT C MM) by Mucous Membrane route daily. cholecalciferol, vitamin D3, (VITAMIN D3 PO) Take  by mouth. ubidecarenone (COQ-10 PO) Take  by mouth. KRILL OIL PO Take  by mouth. MULTIVITAMIN PO Take  by mouth. B.animalis,bifid,infantis,long (Probiotic 4X) 10-15 mg TbEC Take  by mouth. No current facility-administered medications for this visit. Allergies: (reviewed)  Allergies   Allergen Reactions    Pcn [Penicillins] Rash     Patient screened for any delayed non-IgE-mediated reaction to PCN. Patient notes the following:    No delayed non-IgE-mediated reaction to PCN    ALLERGY AT 13YEARS OF AGE-NO HOSPITALIZATION               Physical Exam:  Visit Vitals  /87 (BP 1 Location: Left upper arm, BP Patient Position: Sitting)   Pulse 85   Ht 5' 3\" (1.6 m)   Wt 206 lb (93.4 kg)   BMI 36.49 kg/m²      General: Alert and oriented. No acute distress. Well-nourished  HEENT: No thyroid enlargment. Neck supple without restrictions. Sclera normal. Normal occular motion. Moist mucous membranes. Lymphatics: No evidence of axillary, cervical, or subclavicular adenopathy. Respiratory: clear to auscultation and percussion to the bases. No CVAT. Cardiovascular: regular rate and rhythm. No murmurs, rubs, or gallops. Gastrointestinal: soft, non-tender, non-distended, no masses or organomegaly.  Well-healed incision. Musculoskeletal: normal gait. No joint tenderness, deformity or swelling. No muscular tenderness. Extremities: extremities normal, atraumatic, no cyanosis or edema. Pelvic: deferred today: exam chaperoned by nurse. Normal appearing external genitalia. On speculum exam, the vaginal cuff is intact and healing well. On bimanual, the uterus and cervix are surgically absent. Vaginal cuff intact without defect. No evidence of masses or nodularity on bimanual exam. Deferred rectovaginal exam.   Neuro: Grossly intact. Normal gait and movement. No acute deficit  Skin: No evidence of rashes or skin changes. Lab Date as available:    Lab Results   Component Value Date/Time    WBC 3.8 08/26/2022 09:20 AM    HGB 10.6 (L) 08/26/2022 09:20 AM    HCT 30.2 (L) 08/26/2022 09:20 AM    PLATELET 004 70/09/4386 09:20 AM    MCV 97 08/26/2022 09:20 AM     Lab Results   Component Value Date/Time    Sodium 143 08/26/2022 09:20 AM    Potassium 4.3 08/26/2022 09:20 AM    Chloride 106 08/26/2022 09:20 AM    CO2 23 08/26/2022 09:20 AM    Anion gap 8 08/09/2022 10:15 AM    Glucose 113 (H) 08/26/2022 09:20 AM    BUN 15 08/26/2022 09:20 AM    Creatinine 0.65 08/26/2022 09:20 AM    BUN/Creatinine ratio 23 08/26/2022 09:20 AM    GFR est AA >60 08/09/2022 10:15 AM    GFR est non-AA >60 08/09/2022 10:15 AM    Calcium 9.3 08/26/2022 09:20 AM         IMPRESSION/PLAN:    Ms. Lauralee Holstein is a 69 y.o. 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 On 4/7/2022 underwent Robotic-assisted total laparoscopic hysterectomy, Bilateral salpingo-oophorectomy, Bilateral pelvic sentinel lymph node mapping and biopsy. 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 4mm out of 15 myometrial invasion. Negative washings. Negative SLNs. Negative CT C/A/P 3/23/2022. Initiated on adjuvant carboplatin AUC 6 + paclitaxel 175mg/m2 on 5/16/2022. Completed cycle 6 on 8/30/2022. Problems:     Patient Active Problem List    Diagnosis Date Noted    On antineoplastic chemotherapy 05/24/2022    [facility] 04/07/2022    Carcinosarcoma of endometrium ([facility] 03/22/2022    PMB (postmenopausal bleeding)     Obesity     Malignant neoplasm of uterus ([address] 03/10/2022     Reviewed patient's course to date. Initiated on adjuvant carboplatin AUC 6 + paclitaxel 175mg/m2 on 5/16/2022. Completed cycle 6 on 8/30/2022. Per our prior discussion, based on her pathology being consistent with Stage Ia carcinosarcoma, the patient is considered a high-risk patient. I have recommended adjuvant chemotherapy, carboplatin AUC 6 + paclitaxel 175mg/m2, 6 cycles. I also discussed the role of possible VBT to follow her chemotherapy. She has an appointment with Radiation Oncology this Thursday. RTC in 3 months for continued surveillance. Reviewed precautionary symptoms to return sooner. Plan CT C/A/P in 6 months. All questions and concerns were addressed with the patient and she is comfortable with the plan. An electronic signature was used to authenticate this note.      Chelsea Campos MD

## 2022-09-29 ENCOUNTER — HOSPITAL ENCOUNTER (OUTPATIENT)
Dept: RADIATION THERAPY | Age: 67
Discharge: HOME | End: 2022-09-29

## 2022-10-24 ENCOUNTER — HOSPITAL ENCOUNTER (OUTPATIENT)
Dept: INFUSION THERAPY | Age: 67
Discharge: HOME | End: 2022-10-24
Payer: MEDICARE

## 2022-10-24 VITALS
RESPIRATION RATE: 16 BRPM | TEMPERATURE: 97.7 F | WEIGHT: 210.4 LBS | OXYGEN SATURATION: 98 % | HEART RATE: 80 BPM | BODY MASS INDEX: 37.27 KG/M2

## 2022-10-24 DIAGNOSIS — C54.1 MALIGNANT NEOPLASM OF ENDOMETRIUM (HCC): Primary | ICD-10-CM

## 2022-10-24 PROCEDURE — 77030012965 HC NDL HUBR BBMI -A

## 2022-10-24 PROCEDURE — 74011250636 HC RX REV CODE- 250/636: Performed by: OBSTETRICS & GYNECOLOGY

## 2022-10-24 PROCEDURE — 96523 IRRIG DRUG DELIVERY DEVICE: CPT

## 2022-10-24 PROCEDURE — 74011000250 HC RX REV CODE- 250: Performed by: OBSTETRICS & GYNECOLOGY

## 2022-10-24 RX ORDER — SODIUM CHLORIDE 0.9 % (FLUSH) 0.9 %
5-40 SYRINGE (ML) INJECTION AS NEEDED
Status: DISCONTINUED | OUTPATIENT
Start: 2022-10-24 | End: 2022-10-25 | Stop reason: HOSPADM

## 2022-10-24 RX ORDER — HEPARIN 100 UNIT/ML
500 SYRINGE INTRAVENOUS AS NEEDED
Status: DISCONTINUED | OUTPATIENT
Start: 2022-10-24 | End: 2022-10-25 | Stop reason: HOSPADM

## 2022-10-24 RX ADMIN — HEPARIN 500 UNITS: 100 SYRINGE at 14:19

## 2022-10-24 RX ADMIN — SODIUM CHLORIDE, PRESERVATIVE FREE 10 ML: 5 INJECTION INTRAVENOUS at 14:18

## 2022-10-24 NOTE — PROGRESS NOTES
[facility] Progress Note    Date: 2022    Name: Ad Qureshi    MRN: [id number]         : 1955    Ms. Vicente Arrived ambulatory and in no distress for [place], no labs. Right chest wall port accessed without difficulty, brisk blood return. Ms. Aylin Brennan vitals were reviewed. Visit Vitals  Pulse 80   Temp 97.7 °F (36.5 °C)   Resp 16   Wt 95.4 kg (210 lb 6.4 oz)   SpO2 98%   Breastfeeding No   BMI 37.27 kg/m²       Medications:  Medications Administered       heparin (porcine) pf 500 Units       Admin Date  10/24/2022 Action  Given Dose  500 Units Route  InterCATHeter Administered By  Yasmin Waddell RN              sodium chloride (NS) flush 5-40 mL       Admin Date  10/24/2022 Action  Given Dose  10 mL Route  IntraVENous Administered By  Yasmin Waddell, FRANCISCO JAVIER                    Ms. Emilie Lin tolerated treatment well and was discharged from [place] [address] in stable condition. Port de-accessed, flushed & heparinized per protocol. She is to return on 2022 for her next appointment.     Krystal Javed RN  2022

## 2022-11-08 RX ORDER — SODIUM CHLORIDE 9 MG/ML
5-250 INJECTION, SOLUTION INTRAVENOUS AS NEEDED
Status: CANCELLED | OUTPATIENT
Start: 2022-11-21

## 2022-11-08 RX ORDER — SODIUM CHLORIDE 9 MG/ML
5-40 INJECTION INTRAMUSCULAR; INTRAVENOUS; SUBCUTANEOUS AS NEEDED
Status: CANCELLED | OUTPATIENT
Start: 2022-11-21

## 2022-11-08 RX ORDER — HEPARIN 100 UNIT/ML
500 SYRINGE INTRAVENOUS AS NEEDED
Status: CANCELLED
Start: 2022-11-21

## 2022-11-08 RX ORDER — SODIUM CHLORIDE 0.9 % (FLUSH) 0.9 %
5-40 SYRINGE (ML) INJECTION AS NEEDED
Status: CANCELLED | OUTPATIENT
Start: 2022-11-21

## 2022-11-21 ENCOUNTER — HOSPITAL ENCOUNTER (OUTPATIENT)
Dept: INFUSION THERAPY | Age: 67
Discharge: HOME | End: 2022-11-21
Payer: MEDICARE

## 2022-11-21 VITALS
OXYGEN SATURATION: 97 % | WEIGHT: 207.2 LBS | TEMPERATURE: 98 F | HEART RATE: 73 BPM | DIASTOLIC BLOOD PRESSURE: 75 MMHG | RESPIRATION RATE: 16 BRPM | BODY MASS INDEX: 36.7 KG/M2 | SYSTOLIC BLOOD PRESSURE: 149 MMHG

## 2022-11-21 DIAGNOSIS — C54.1 MALIGNANT NEOPLASM OF ENDOMETRIUM (HCC): Primary | ICD-10-CM

## 2022-11-21 PROCEDURE — 77030012965 HC NDL HUBR BBMI -A

## 2022-11-21 PROCEDURE — 96523 IRRIG DRUG DELIVERY DEVICE: CPT

## 2022-11-21 PROCEDURE — 74011000250 HC RX REV CODE- 250: Performed by: OBSTETRICS & GYNECOLOGY

## 2022-11-21 PROCEDURE — 74011250636 HC RX REV CODE- 250/636: Performed by: OBSTETRICS & GYNECOLOGY

## 2022-11-21 RX ORDER — SODIUM CHLORIDE 9 MG/ML
5-250 INJECTION, SOLUTION INTRAVENOUS AS NEEDED
Status: DISPENSED | OUTPATIENT
Start: 2022-11-21 | End: 2022-11-21

## 2022-11-21 RX ORDER — SODIUM CHLORIDE 9 MG/ML
5-40 INJECTION INTRAMUSCULAR; INTRAVENOUS; SUBCUTANEOUS AS NEEDED
Status: ACTIVE | OUTPATIENT
Start: 2022-11-21 | End: 2022-11-21

## 2022-11-21 RX ORDER — SODIUM CHLORIDE 0.9 % (FLUSH) 0.9 %
5-40 SYRINGE (ML) INJECTION AS NEEDED
Status: DISPENSED | OUTPATIENT
Start: 2022-11-21 | End: 2022-11-21

## 2022-11-21 RX ORDER — HEPARIN 100 UNIT/ML
500 SYRINGE INTRAVENOUS AS NEEDED
Status: ACTIVE | OUTPATIENT
Start: 2022-11-21 | End: 2022-11-21

## 2022-11-21 RX ADMIN — SODIUM CHLORIDE, PRESERVATIVE FREE 10 ML: 5 INJECTION INTRAVENOUS at 09:45

## 2022-11-21 RX ADMIN — HEPARIN SODIUM (PORCINE) LOCK FLUSH IV SOLN 100 UNIT/ML 500 UNITS: 100 SOLUTION at 09:45

## 2022-11-21 NOTE — PROGRESS NOTES
[facility] Note:  Arrived - 945     Visit Vitals  BP (!) 149/75   Pulse 73   Temp 98 °F (36.7 °C)   Resp 16   Wt 94 kg (207 lb 3.2 oz)   SpO2 97%   BMI 36.70 kg/m²         Port accessed & flushed per protocol w/o difficulty, positive blood return noted. [place] needle removed. 950 - Tolerated well. Pt denies any acute problems/changes. Discharged from [facility] ambulatory. No distress.  Next appt: 12/19/22

## 2022-12-12 RX ORDER — SODIUM CHLORIDE 9 MG/ML
5-40 INJECTION INTRAMUSCULAR; INTRAVENOUS; SUBCUTANEOUS AS NEEDED
OUTPATIENT
Start: 2022-12-19

## 2022-12-12 RX ORDER — SODIUM CHLORIDE 9 MG/ML
5-250 INJECTION, SOLUTION INTRAVENOUS AS NEEDED
OUTPATIENT
Start: 2022-12-19

## 2022-12-12 RX ORDER — HEPARIN 100 UNIT/ML
500 SYRINGE INTRAVENOUS AS NEEDED
Start: 2022-12-19

## 2022-12-12 RX ORDER — SODIUM CHLORIDE 0.9 % (FLUSH) 0.9 %
5-40 SYRINGE (ML) INJECTION AS NEEDED
OUTPATIENT
Start: 2022-12-19

## 2022-12-19 ENCOUNTER — HOSPITAL ENCOUNTER (OUTPATIENT)
Dept: INFUSION THERAPY | Age: 67
Discharge: HOME | End: 2022-12-19
Payer: MEDICARE

## 2022-12-19 VITALS
DIASTOLIC BLOOD PRESSURE: 83 MMHG | BODY MASS INDEX: 36.76 KG/M2 | HEART RATE: 74 BPM | WEIGHT: 207.5 LBS | OXYGEN SATURATION: 98 % | SYSTOLIC BLOOD PRESSURE: 147 MMHG | RESPIRATION RATE: 16 BRPM

## 2022-12-19 DIAGNOSIS — C54.1 MALIGNANT NEOPLASM OF ENDOMETRIUM (HCC): Primary | ICD-10-CM

## 2022-12-19 PROCEDURE — 74011250636 HC RX REV CODE- 250/636: Performed by: OBSTETRICS & GYNECOLOGY

## 2022-12-19 PROCEDURE — 77030012965 HC NDL HUBR BBMI -A

## 2022-12-19 PROCEDURE — 74011000250 HC RX REV CODE- 250: Performed by: OBSTETRICS & GYNECOLOGY

## 2022-12-19 PROCEDURE — 96523 IRRIG DRUG DELIVERY DEVICE: CPT

## 2022-12-19 RX ORDER — HEPARIN 100 UNIT/ML
500 SYRINGE INTRAVENOUS AS NEEDED
Status: ACTIVE | OUTPATIENT
Start: 2022-12-19 | End: 2022-12-19

## 2022-12-19 RX ORDER — SODIUM CHLORIDE 0.9 % (FLUSH) 0.9 %
5-40 SYRINGE (ML) INJECTION AS NEEDED
Status: DISPENSED | OUTPATIENT
Start: 2022-12-19 | End: 2022-12-19

## 2022-12-19 RX ADMIN — HEPARIN SODIUM (PORCINE) LOCK FLUSH IV SOLN 100 UNIT/ML 500 UNITS: 100 SOLUTION at 10:37

## 2022-12-19 RX ADMIN — SODIUM CHLORIDE, PRESERVATIVE FREE 10 ML: 5 INJECTION INTRAVENOUS at 10:37

## 2022-12-19 NOTE — PROGRESS NOTES
[facility] VISIT NOTE    7299  Pt arrived at [facility] ambulatory and in no distress for port flush. Blood pressure (!) 147/83, pulse 74, resp. rate 16, weight 94.1 kg (207 lb 8 oz), SpO2 98 %. Right chest port accessed with 0.75 in mike no difficulty. Positive blood return noted. Port de-accessed and flushed per protocol. 1050  D/C'd from [facility] ambulatory and in no distress.  Next appointment is 1/16/23 at 10am.

## 2023-01-12 RX ORDER — SODIUM CHLORIDE 9 MG/ML
5-40 INJECTION INTRAVENOUS AS NEEDED
OUTPATIENT
Start: 2023-01-16

## 2023-01-12 RX ORDER — SODIUM CHLORIDE 0.9 % (FLUSH) 0.9 %
5-40 SYRINGE (ML) INJECTION AS NEEDED
OUTPATIENT
Start: 2023-01-16

## 2023-01-12 RX ORDER — SODIUM CHLORIDE 9 MG/ML
5-250 INJECTION, SOLUTION INTRAVENOUS AS NEEDED
OUTPATIENT
Start: 2023-01-16

## 2023-01-12 RX ORDER — HEPARIN 100 UNIT/ML
500 SYRINGE INTRAVENOUS AS NEEDED
Start: 2023-01-16

## 2023-01-16 ENCOUNTER — HOSPITAL ENCOUNTER (OUTPATIENT)
Dept: INFUSION THERAPY | Age: 68
Discharge: HOME | End: 2023-01-16
Payer: MEDICARE

## 2023-01-16 VITALS
SYSTOLIC BLOOD PRESSURE: 141 MMHG | OXYGEN SATURATION: 97 % | RESPIRATION RATE: 16 BRPM | DIASTOLIC BLOOD PRESSURE: 84 MMHG | HEART RATE: 69 BPM | HEIGHT: 63 IN | WEIGHT: 206 LBS | TEMPERATURE: 97.3 F | BODY MASS INDEX: 36.5 KG/M2

## 2023-01-16 DIAGNOSIS — C54.1 MALIGNANT NEOPLASM OF ENDOMETRIUM (HCC): Primary | ICD-10-CM

## 2023-01-16 LAB — CANCER AG125 SERPL-ACNC: 7 U/ML (ref 1.5–35)

## 2023-01-16 PROCEDURE — 86304 IMMUNOASSAY TUMOR CA 125: CPT

## 2023-01-16 PROCEDURE — 74011000250 HC RX REV CODE- 250: Performed by: OBSTETRICS & GYNECOLOGY

## 2023-01-16 PROCEDURE — 77030012965 HC NDL HUBR BBMI -A

## 2023-01-16 PROCEDURE — 74011250636 HC RX REV CODE- 250/636: Performed by: OBSTETRICS & GYNECOLOGY

## 2023-01-16 PROCEDURE — 36591 DRAW BLOOD OFF VENOUS DEVICE: CPT

## 2023-01-16 RX ORDER — SODIUM CHLORIDE 0.9 % (FLUSH) 0.9 %
5-40 SYRINGE (ML) INJECTION AS NEEDED
Status: DISPENSED | OUTPATIENT
Start: 2023-01-16 | End: 2023-01-16

## 2023-01-16 RX ORDER — HEPARIN 100 UNIT/ML
500 SYRINGE INTRAVENOUS AS NEEDED
Status: ACTIVE | OUTPATIENT
Start: 2023-01-16 | End: 2023-01-16

## 2023-01-16 RX ADMIN — HEPARIN SODIUM (PORCINE) LOCK FLUSH IV SOLN 100 UNIT/ML 500 UNITS: 100 SOLUTION at 10:03

## 2023-01-16 RX ADMIN — SODIUM CHLORIDE, PRESERVATIVE FREE 10 ML: 5 INJECTION INTRAVENOUS at 10:03

## 2023-01-16 NOTE — PROGRESS NOTES
[facility] Progress Note    Date: 2023    Name: Thania Gutierrez    MRN: [id number]         : 1955    Ms. Vicente Arrived ambulatory and in no distress for PF/Labs. Right chest wall port accessed without difficulty, labs drawn & sent for processing. Ms. Ariel Calixto vitals were reviewed. Visit Vitals  BP (!) 141/84 (BP 1 Location: Left upper arm, BP Patient Position: Sitting)   Pulse 69   Temp 97.3 °F (36.3 °C)   Resp 16   Ht 5' 3\" (1.6 m)   Wt 93.4 kg (206 lb)   SpO2 97%   Breastfeeding No   BMI 36.49 kg/m²       Labs pending in [facility]. Medications:  Medications Administered       heparin (porcine) pf 500 Units       Admin Date  2023 Action  Given Dose  500 Units Route  InterCATHeter Administered By  Dorothy Glasgow, FRANCISCO JAVIER              sodium chloride (NS) flush 5-40 mL       Admin Date  2023 Action  Given Dose  10 mL Route  IntraVENous Administered By  Dorothy Glasgow, FRANCISCO JAVIER                    Ms. Rach Lopez tolerated treatment well and was discharged from [place] [address] in stable condition. Port de-accessed, flushed & heparinized per protocol. She is to return on 2023 for her next appointment.     Paula Gunter RN  2023

## 2023-01-16 NOTE — PROGRESS NOTES
3 month follow up, post radiation for malignant neoplasm of endometrium ,  pt reports no abnormal spotting or bleeding, pt states no questions or concerns for today's visit, pt states she finished radiation 11/3/22    1. Have you been to the ER, urgent care clinic since your last visit? Hospitalized since your last visit?  no    2. Have you seen or consulted any other health care providers outside of the [address] [place] [address] since your last visit? Include any pap smears or colon screening.    no

## 2023-01-17 ENCOUNTER — OFFICE VISIT (OUTPATIENT)
Dept: GYNECOLOGY | Age: 68
End: 2023-01-17
Payer: MEDICARE

## 2023-01-17 VITALS
DIASTOLIC BLOOD PRESSURE: 67 MMHG | WEIGHT: 207 LBS | BODY MASS INDEX: 36.68 KG/M2 | SYSTOLIC BLOOD PRESSURE: 128 MMHG | HEIGHT: 63 IN | HEART RATE: 75 BPM

## 2023-01-17 DIAGNOSIS — C54.1 CARCINOSARCOMA OF ENDOMETRIUM (HCC): Primary | ICD-10-CM

## 2023-01-17 PROCEDURE — 1101F PT FALLS ASSESS-DOCD LE1/YR: CPT | Performed by: OBSTETRICS & GYNECOLOGY

## 2023-01-17 PROCEDURE — 99214 OFFICE O/P EST MOD 30 MIN: CPT | Performed by: OBSTETRICS & GYNECOLOGY

## 2023-01-17 PROCEDURE — G8432 DEP SCR NOT DOC, RNG: HCPCS | Performed by: OBSTETRICS & GYNECOLOGY

## 2023-01-17 PROCEDURE — 3017F COLORECTAL CA SCREEN DOC REV: CPT | Performed by: OBSTETRICS & GYNECOLOGY

## 2023-01-17 PROCEDURE — G8427 DOCREV CUR MEDS BY ELIG CLIN: HCPCS | Performed by: OBSTETRICS & GYNECOLOGY

## 2023-01-17 PROCEDURE — G8417 CALC BMI ABV UP PARAM F/U: HCPCS | Performed by: OBSTETRICS & GYNECOLOGY

## 2023-01-17 PROCEDURE — G0463 HOSPITAL OUTPT CLINIC VISIT: HCPCS | Performed by: OBSTETRICS & GYNECOLOGY

## 2023-01-17 PROCEDURE — 1090F PRES/ABSN URINE INCON ASSESS: CPT | Performed by: OBSTETRICS & GYNECOLOGY

## 2023-01-17 PROCEDURE — 1123F ACP DISCUSS/DSCN MKR DOCD: CPT | Performed by: OBSTETRICS & GYNECOLOGY

## 2023-01-17 PROCEDURE — G8400 PT W/DXA NO RESULTS DOC: HCPCS | Performed by: OBSTETRICS & GYNECOLOGY

## 2023-01-17 PROCEDURE — G8536 NO DOC ELDER MAL SCRN: HCPCS | Performed by: OBSTETRICS & GYNECOLOGY

## 2023-01-17 NOTE — PROGRESS NOTES
[facility] [address], [address]  P [phone]  F [phone]    Office Note  Patient ID:  Name:  Mark Matos  MRN:  [id number]  :  1955/67 y.o. Date:  2023      HISTORY OF PRESENT ILLNESS:  Ms. Mark Matos is a 69 y.o. 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 On 2022 underwent Robotic-assisted total laparoscopic hysterectomy, Bilateral salpingo-oophorectomy, Bilateral pelvic sentinel lymph node mapping and biopsy. 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 4mm out of 15 myometrial invasion. Negative washings. Negative SLNs. Negative CT C/A/P 3/23/2022. Initiated on adjuvant carboplatin AUC 6 + paclitaxel 175mg/m2 on 2022. Completed cycle 6 on 2022. Completed VBT on 2022. CT C/A/P 2022 negative for disease. Presents today for continued surveillance. Doing well without complaints. Denies vaginal bleeding/discharge, abdominal/pelvic pain, nausea, vomiting, constipation, diarrhea, CP, SOB, hematuria, hematochezia, change in appetite or bowel movements, bloating, fevers, chills, or urinary symptoms. Initial History:  Ms. Mark Matos is a 69 y.o.  postmenopausal female who presents as a new patient from Dr. Atif Suazo for carcinosarcoma of the endometrium. The patient reported vaginal spotting/bleeding in February (). She underwent a pelvic ultrasound and endometrial biopsy with Dr. Atif Suazo, which ultimately demonstrated carcinosarcoma. The patient denies pelvic or abdominal pain/bloating. Denies CP or SOB. Denies hematuria or hematochezia. She is without any other complaints. Pathology Review:   2022:  * * *FINAL PATHOLOGIC DIAGNOSIS* * *   1. Right pelvic sentinel lymph node, sentinel lymph node biopsy:        One benign lymph node, no tumor seen (0/1)   2.   Left pelvic sentinel lymph node, sentinel lymph node biopsy:        Two benign lymph nodes, no tumor seen (0/2)   3. 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 (focal chondroid elements) with high grade epithelial component,   high grade serous carcinoma and minor component of clear cell carcinoma   (<5%)   Vascular invasion is identified   Mild chronic cervicitis, no tumor seen   Benign endocervical polyp with adjacent detached fragments carcinosarcoma   (See comment)   Bilateral ovaries, no tumor seen   Bilateral fallopian tubes, no tumor seen   Leiomyoma uteri   ENDOMETRIUM    SPECIMEN       Procedure: Total hysterectomy and bilateral salpingo-oophorectomy   TUMOR       Histologic Type: Carcinosarcoma (malignant mixed Müllerian tumor) with   high grade serous    carcinoma, and minor component of clear cell carcinoma       Histologic Grade: High Grade       Myometrial Invasion: Cannot be determined with certainty: The sample   is received in         pieces, so definitive depth of invasion cannot be rendered. The   deepest myometrial invasion of the         intact pieces of uterus measures 4mm of 15mm       Uterine Serosa Involvement: Not identified       Cervical Stromal Involvement: Not identified       Other Tissue / Organ Involvement: Not identified       Lymphovascular Invasion: Not identified   MARGINS       Margins: Not applicable   LYMPH NODES       Lymph Node Status:  All lymph nodes negative for tumor cells           Total Number of Pelvic Nodes Examined:           3           Number of Pelvic [place] Nodes Examined:           3           Total Number of Para-aortic Nodes Examined:      0           Number of Para-aortic [place] Nodes Examined:      0    PATHOLOGIC STAGE CLASSIFICATION (pTNM, AJCC 8th Edition)       Primary Tumor (pT):                          pT1b (based on intact   pieces of uterus)       Regional Lymph Nodes Modifier:                (sn)       Regional Lymph Nodes (pN):                     pN0   * * *Comment* * *   Due to the fragmented nature of the specimen upon receipt in the   laboratory, the presence of detached fragments of tumor on the slide   adjacent to sections of the endocervical polyp, are favored to represent   contamination, rather than true endocervical involvement. Immunohistochemical stains for pancytokeratin performed on the sentinel   lymph nodes (specimen #1(1A,1B,1F) and specimen #2(2A,2B, and 2C) are   negative for metastatic carcinoma cells. Additional immunohistochemical stains performed on a section of tumor   reveal the following staining pattern:   Estrogen Receptor: Negative   P63: Focal nuclear decoration of occasional basal epithelial cells   Desmin: Negative   Pankeratin(AE1/3): Diffuse epithelial cytoplasmic membrane decoration of   tumor cells   These findings support the diagnosis. 3/10/2022:  Specimen A: Endometrial biopsy: Features consistent with malignant mixed mesodermal tumor (carcinosarcoma) with an epithelial portion component of serous carcinoma. Imaging Review:  CT C/A/P 9/26/2022:  FINDINGS:  There is a right chest portacatheter. THYROID: No nodule. MEDIASTINUM: No mass or lymphadenopathy. KARL: No mass or lymphadenopathy. THORACIC AORTA: No aneurysm. MAIN PULMONARY ARTERY: Normal in caliber. HEART: Normal in size. ESOPHAGUS: No wall thickening or dilatation. TRACHEA/BRONCHI: Patent. PLEURA: No effusion or pneumothorax. LUNGS: No nodule, mass, or airspace disease. Liver: No mass or biliary dilatation. Biliary tree: There is a peripherally calcified gallstone within the gallbladder  measuring 3 cm. The CBD is not dilated. Spleen: Within normal limits. Pancreas: No inflammation, mass or ductal dilatation. Adrenals: Unremarkable. Kidneys: No mass or hydronephrosis. Stomach: Unremarkable.   Small bowel: No dilatation or wall thickening. Colon: Diverticulosis of the colon, with no evidence of acute diverticulitis. Appendix: Normal  Peritoneum: No ascites or pneumoperitoneum. Retroperitoneum: No lymphadenopathy or aortic aneurysm. Diffuse calcific aortic  atherosclerosis. Reproductive organs: Status post hysterectomy. No pelvic free fluid or mass. Urinary bladder: No mass or calculus. Bones: No destructive bone lesion. Abdominal wall: No mass or hernia. Additional comments: N/A  IMPRESSION  1. Expected postsurgical changes of interval hysterectomy. 2. No evidence of metastatic disease within the chest, abdomen, or pelvis. 3. Cholelithiasis. 4. Diverticulosis of the colon. CT C/A/P 3/23/2022:  FINDINGS:   THYROID: No nodule. MEDIASTINUM: No mass or lymphadenopathy. KARL: No mass or lymphadenopathy. THORACIC AORTA: No dissection or aneurysm. MAIN PULMONARY ARTERY: Normal in caliber. TRACHEA/BRONCHI: Patent. ESOPHAGUS: No wall thickening or dilatation. HEART: Normal in size. PLEURA: No effusion or pneumothorax. LUNGS: No nodule, mass, or airspace disease. Incidentally noted is a 2 mm  subpleural nodule right upper lobe image 46 series 4  LIVER: No mass or biliary dilatation. There is hepatic steatosis  GALLBLADDER: There is a gallstone  SPLEEN: No mass. PANCREAS: No mass or ductal dilatation. ADRENALS: There is a 1 cm left adrenal nodule  KIDNEYS: No mass, calculus, or hydronephrosis. STOMACH: There is thickening of the body of the stomach most likely related to  incomplete distention  SMALL BOWEL: No dilatation or wall thickening. COLON: No dilatation or wall thickening. APPENDIX: Unremarkable. PERITONEUM: No ascites or pneumoperitoneum. RETROPERITONEUM: No lymphadenopathy or aortic aneurysm. REPRODUCTIVE ORGANS: The endometrium is thickened measuring up to 5.3 cm.  There  is suspicion of a 3.2 cm enhancing lesion in the anterior uterine myometrium and  a 2.8 cm enhancing lesion right uterine myometrium  URINARY BLADDER: Incompletely distended  BONES: No destructive bone lesion. There are small low densities at T1/T2  bilaterally consistent with small lateral meningocele. There is slight scoliosis  levoconvex lumbar spine with 3 mm of anterolisthesis of L4 on L5 and  degenerative changes mid to lower lumbar spine  ADDITIONAL COMMENTS: N/A  IMPRESSION  1. CT of the chest demonstrates no definite evidence of metastatic disease. 2. CT of the abdomen and pelvis demonstrates the endometrium to be thickened  measuring 5.3 cm. There is suspicion of 2 enhancing lesions in the uterine  myometrium. No adenopathy or mass is identified to suggest metastatic disease. There is a 1 cm nodule in the left adrenal gland which may represent a small  adenoma    Pelvic ultrasound 3/14/2022:  Impression: Multi-fibroid uterus. Possible fibroid versus polyp in uterine cavity. Ovaries are not visualized. ROS:  A comprehensive review of systems was negative except for that written in the History of Present Illness.  , 10 point ROS      ECOG ndGndrndanddndend:nd nd2nd Problem List:  Patient Active Problem List    Diagnosis Date Noted    On antineoplastic chemotherapy 05/24/2022    Endometrial cancer ([address] 04/07/2022    Carcinosarcoma of endometrium ([address] 03/22/2022    PMB (postmenopausal bleeding)     Obesity     Malignant neoplasm of uterus ([address] 03/10/2022     PMH:  Past Medical History:   Diagnosis Date    Arthritis 2005    Enlarged uterus     Malignant neoplasm of uterus ([address] 03/10/2022    endometrial biopsy done on 3/10/22 by Dr. Mercedes Denton    Obesity     PMB (postmenopausal bleeding)       PSH:  Past Surgical History:   Procedure Laterality Date    HX COLONOSCOPY      HX ORTHOPAEDIC Bilateral 2006/2017    knee replacement X2    HX OTHER SURGICAL  2016    repair of retina    HX TONSILLECTOMY      HX TUBAL LIGATION  1981    IR INSERT TUNL CVC W PORT OVER 5 YEARS  5/4/2022      Social History:  Social History     Tobacco Use    Smoking status: Never    Smokeless tobacco: Never   Substance Use Topics    Alcohol use: Yes     Alcohol/week: 7.0 standard drinks     Types: 7 Glasses of wine per week      Family History:  Family History   Problem Relation Age of Onset    Cancer Father         Bladder    Anesth Problems Neg Hx       Medications: (reviewed)  Current Outpatient Medications   Medication Sig    vit C/Zn gluc/herbal no. 325 (ELDERBERRY ZINC VIT C MM) by Mucous Membrane route daily. cholecalciferol, vitamin D3, (VITAMIN D3 PO) Take  by mouth. ubidecarenone (COQ-10 PO) Take  by mouth. KRILL OIL PO Take  by mouth. MULTIVITAMIN PO Take  by mouth. B.animalis,bifid,infantis,long (Probiotic 4X) 10-15 mg TbEC Take  by mouth.    lidocaine-prilocaine (EMLA) topical cream Apply small amount over port area and cover with band aid one hour before chemo (Patient not taking: Reported on 1/17/2023)     No current facility-administered medications for this visit. Allergies: (reviewed)  Allergies   Allergen Reactions    Pcn [Penicillins] Rash     Patient screened for any delayed non-IgE-mediated reaction to PCN. Patient notes the following:    No delayed non-IgE-mediated reaction to PCN    ALLERGY AT 13YEARS OF AGE-NO HOSPITALIZATION               Physical Exam:  Visit Vitals  /67 (BP 1 Location: Left upper arm, BP Patient Position: Sitting)   Pulse 75   Ht 5' 3\" (1.6 m)   Wt 207 lb (93.9 kg)   BMI 36.67 kg/m²      General: Alert and oriented. No acute distress. Well-nourished  HEENT: No thyroid enlargment. Neck supple without restrictions. Sclera normal. Normal occular motion. Moist mucous membranes. Lymphatics: No evidence of axillary, cervical, or subclavicular adenopathy. Respiratory: clear to auscultation and percussion to the bases. No CVAT. Cardiovascular: regular rate and rhythm. No murmurs, rubs, or gallops. Gastrointestinal: soft, non-tender, non-distended, no masses or organomegaly. Well-healed incision. Musculoskeletal: normal gait. No joint tenderness, deformity or swelling. No muscular tenderness. Extremities: extremities normal, atraumatic, no cyanosis or edema. Pelvic: exam chaperoned by nurse. Normal appearing external genitalia. On speculum exam, the vagina is atrophic. The uterus and cervix are surgically absent. No evidence of masses or nodularity on bimanual exam. Deferred rectovaginal exam.   Neuro: Grossly intact. Normal gait and movement. No acute deficit  Skin: No evidence of rashes or skin changes. Lab Date as available:    Lab Results   Component Value Date/Time    WBC 3.8 08/26/2022 09:20 AM    HGB 10.6 (L) 08/26/2022 09:20 AM    HCT 30.2 (L) 08/26/2022 09:20 AM    PLATELET 200 62/92/6241 09:20 AM    MCV 97 08/26/2022 09:20 AM     Lab Results   Component Value Date/Time    Sodium 143 08/26/2022 09:20 AM    Potassium 4.3 08/26/2022 09:20 AM    Chloride 106 08/26/2022 09:20 AM    CO2 23 08/26/2022 09:20 AM    Anion gap 8 08/09/2022 10:15 AM    Glucose 113 (H) 08/26/2022 09:20 AM    BUN 15 08/26/2022 09:20 AM    Creatinine 0.65 08/26/2022 09:20 AM    BUN/Creatinine ratio 23 08/26/2022 09:20 AM    GFR est AA >60 08/09/2022 10:15 AM    GFR est non-AA >60 08/09/2022 10:15 AM    Calcium 9.3 08/26/2022 09:20 AM         IMPRESSION/PLAN:    Ms. Moisés Cintron is a 69 y.o. 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 On 4/7/2022 underwent Robotic-assisted total laparoscopic hysterectomy, Bilateral salpingo-oophorectomy, Bilateral pelvic sentinel lymph node mapping and biopsy. 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 4mm out of 15 myometrial invasion. Negative washings. Negative SLNs. Negative CT C/A/P 3/23/2022. Initiated on adjuvant carboplatin AUC 6 + paclitaxel 175mg/m2 on 5/16/2022. Completed cycle 6 on 8/30/2022. Completed VBT on 11/2022. CT C/A/P 9/26/2022 negative for disease.      Problems:     Patient Active Problem List Diagnosis Date Noted    On antineoplastic chemotherapy 05/24/2022    Endometrial cancer ([facility]) 04/07/2022    Carcinosarcoma of endometrium ([facility]) 03/22/2022    PMB (postmenopausal bleeding)     Obesity     Malignant neoplasm of uterus ([facility]) 03/10/2022     Reviewed patient's course to date. Initiated on adjuvant carboplatin AUC 6 + paclitaxel 175mg/m2 on 5/16/2022. Completed cycle 6 on 8/30/2022. Completed VBT on 11/2022. CT C/A/P 9/26/2022 negative for disease. RTC in 3 months for continued surveillance. Reviewed precautionary symptoms to return sooner. Plan CT C/A/P in 6 months (next visit). All questions and concerns were addressed with the patient and she is comfortable with the plan. An electronic signature was used to authenticate this note.      Meghna Wade MD

## 2023-01-18 RX ORDER — HEPARIN 100 UNIT/ML
500 SYRINGE INTRAVENOUS AS NEEDED
Start: 2023-02-13

## 2023-01-18 RX ORDER — SODIUM CHLORIDE 9 MG/ML
5-40 INJECTION INTRAVENOUS AS NEEDED
OUTPATIENT
Start: 2023-02-13

## 2023-01-18 RX ORDER — SODIUM CHLORIDE 0.9 % (FLUSH) 0.9 %
5-40 SYRINGE (ML) INJECTION AS NEEDED
OUTPATIENT
Start: 2023-02-13

## 2023-01-18 RX ORDER — SODIUM CHLORIDE 9 MG/ML
5-250 INJECTION, SOLUTION INTRAVENOUS AS NEEDED
OUTPATIENT
Start: 2023-02-13

## 2023-02-02 ENCOUNTER — HOSPITAL ENCOUNTER (OUTPATIENT)
Dept: RADIATION THERAPY | Age: 68
Discharge: HOME | End: 2023-02-02

## 2023-02-13 ENCOUNTER — HOSPITAL ENCOUNTER (OUTPATIENT)
Dept: INFUSION THERAPY | Age: 68
Discharge: HOME | End: 2023-02-13
Payer: MEDICARE

## 2023-02-13 VITALS
OXYGEN SATURATION: 95 % | TEMPERATURE: 98 F | DIASTOLIC BLOOD PRESSURE: 79 MMHG | HEART RATE: 75 BPM | SYSTOLIC BLOOD PRESSURE: 148 MMHG | RESPIRATION RATE: 18 BRPM

## 2023-02-13 DIAGNOSIS — C54.1 MALIGNANT NEOPLASM OF ENDOMETRIUM (HCC): Primary | ICD-10-CM

## 2023-02-13 PROCEDURE — 77030012965 HC NDL HUBR BBMI -A

## 2023-02-13 PROCEDURE — 74011250636 HC RX REV CODE- 250/636: Performed by: OBSTETRICS & GYNECOLOGY

## 2023-02-13 PROCEDURE — 74011000250 HC RX REV CODE- 250: Performed by: OBSTETRICS & GYNECOLOGY

## 2023-02-13 PROCEDURE — 96523 IRRIG DRUG DELIVERY DEVICE: CPT

## 2023-02-13 RX ORDER — SODIUM CHLORIDE 0.9 % (FLUSH) 0.9 %
5-40 SYRINGE (ML) INJECTION AS NEEDED
Status: DISPENSED | OUTPATIENT
Start: 2023-02-13 | End: 2023-02-13

## 2023-02-13 RX ORDER — HEPARIN 100 UNIT/ML
500 SYRINGE INTRAVENOUS AS NEEDED
Status: ACTIVE | OUTPATIENT
Start: 2023-02-13 | End: 2023-02-13

## 2023-02-13 RX ADMIN — SODIUM CHLORIDE, PRESERVATIVE FREE 10 ML: 5 INJECTION INTRAVENOUS at 11:02

## 2023-02-13 RX ADMIN — Medication 500 UNITS: at 11:02

## 2023-02-13 NOTE — PROGRESS NOTES
[facility] Note:  Arrived - 1055    Visit Vitals  BP (!) 148/79 (BP 1 Location: Right lower arm, BP Patient Position: Sitting)   Pulse 75   Temp 98 °F (36.7 °C)   Resp 18   SpO2 95%        Assessment - unchanged. Port accessed & flushed per protocol w/o difficulty. Vernon needle removed. 1105 - Tolerated well. Pt denies any acute problems/changes. Discharged from [facility] ambulatory. No distress.  Next appt: 3/13/23 at 1000

## 2023-02-14 RX ORDER — SODIUM CHLORIDE 9 MG/ML
5-40 INJECTION INTRAVENOUS AS NEEDED
OUTPATIENT
Start: 2023-03-13

## 2023-02-14 RX ORDER — SODIUM CHLORIDE 0.9 % (FLUSH) 0.9 %
5-40 SYRINGE (ML) INJECTION AS NEEDED
OUTPATIENT
Start: 2023-03-13

## 2023-02-14 RX ORDER — SODIUM CHLORIDE 9 MG/ML
5-250 INJECTION, SOLUTION INTRAVENOUS AS NEEDED
OUTPATIENT
Start: 2023-03-13

## 2023-02-14 RX ORDER — HEPARIN 100 UNIT/ML
500 SYRINGE INTRAVENOUS AS NEEDED
Start: 2023-03-13

## 2023-03-13 ENCOUNTER — HOSPITAL ENCOUNTER (OUTPATIENT)
Dept: INFUSION THERAPY | Age: 68
Discharge: HOME | End: 2023-03-13
Payer: MEDICARE

## 2023-03-13 VITALS
HEART RATE: 68 BPM | SYSTOLIC BLOOD PRESSURE: 135 MMHG | TEMPERATURE: 97.7 F | DIASTOLIC BLOOD PRESSURE: 72 MMHG | RESPIRATION RATE: 18 BRPM | OXYGEN SATURATION: 99 %

## 2023-03-13 DIAGNOSIS — C54.1 MALIGNANT NEOPLASM OF ENDOMETRIUM (HCC): Primary | ICD-10-CM

## 2023-03-13 PROCEDURE — 74011000250 HC RX REV CODE- 250: Performed by: PHYSICIAN ASSISTANT

## 2023-03-13 PROCEDURE — 74011250636 HC RX REV CODE- 250/636: Performed by: PHYSICIAN ASSISTANT

## 2023-03-13 PROCEDURE — 96523 IRRIG DRUG DELIVERY DEVICE: CPT

## 2023-03-13 PROCEDURE — 77030012965 HC NDL HUBR BBMI -A

## 2023-03-13 RX ORDER — SODIUM CHLORIDE 0.9 % (FLUSH) 0.9 %
5-40 SYRINGE (ML) INJECTION AS NEEDED
Status: DISPENSED | OUTPATIENT
Start: 2023-03-13 | End: 2023-03-13

## 2023-03-13 RX ORDER — HEPARIN 100 UNIT/ML
500 SYRINGE INTRAVENOUS AS NEEDED
Status: ACTIVE | OUTPATIENT
Start: 2023-03-13 | End: 2023-03-13

## 2023-03-13 RX ADMIN — SODIUM CHLORIDE, PRESERVATIVE FREE 10 ML: 5 INJECTION INTRAVENOUS at 10:05

## 2023-03-13 RX ADMIN — HEPARIN 500 UNITS: 100 SYRINGE at 10:05

## 2023-03-13 NOTE — PROGRESS NOTES
[facility] Note:  Arrived - 1000    Visit Vitals  /72 (BP 1 Location: Left upper arm, BP Patient Position: Sitting)   Pulse 68   Temp 97.7 °F (36.5 °C)   Resp 18   SpO2 99%         Assessment - unchanged. Port accessed & flushed per protocol w/o difficulty. [place] needle removed. 1005 - Tolerated well. Pt denies any acute problems/changes. Discharged from [facility] ambulatory. No distress.  Next appt: 4/10/23 at 1030

## 2023-03-16 RX ORDER — SODIUM CHLORIDE 9 MG/ML
5-250 INJECTION, SOLUTION INTRAVENOUS AS NEEDED
OUTPATIENT
Start: 2023-04-10

## 2023-03-16 RX ORDER — SODIUM CHLORIDE 9 MG/ML
5-40 INJECTION INTRAVENOUS AS NEEDED
OUTPATIENT
Start: 2023-04-10

## 2023-03-16 RX ORDER — HEPARIN 100 UNIT/ML
500 SYRINGE INTRAVENOUS AS NEEDED
Start: 2023-04-10

## 2023-03-16 RX ORDER — SODIUM CHLORIDE 0.9 % (FLUSH) 0.9 %
5-40 SYRINGE (ML) INJECTION AS NEEDED
OUTPATIENT
Start: 2023-04-10

## 2023-04-10 ENCOUNTER — HOSPITAL ENCOUNTER (OUTPATIENT)
Dept: INFUSION THERAPY | Age: 68
Discharge: HOME | End: 2023-04-10
Payer: MEDICARE

## 2023-04-10 VITALS
TEMPERATURE: 97.1 F | DIASTOLIC BLOOD PRESSURE: 84 MMHG | RESPIRATION RATE: 16 BRPM | HEART RATE: 66 BPM | OXYGEN SATURATION: 100 % | SYSTOLIC BLOOD PRESSURE: 138 MMHG

## 2023-04-10 DIAGNOSIS — C54.1 MALIGNANT NEOPLASM OF ENDOMETRIUM (HCC): Primary | ICD-10-CM

## 2023-04-10 PROCEDURE — 77030012965 HC NDL HUBR BBMI -A

## 2023-04-10 PROCEDURE — 74011250636 HC RX REV CODE- 250/636: Performed by: OBSTETRICS & GYNECOLOGY

## 2023-04-10 PROCEDURE — 96523 IRRIG DRUG DELIVERY DEVICE: CPT

## 2023-04-10 PROCEDURE — 74011000250 HC RX REV CODE- 250: Performed by: OBSTETRICS & GYNECOLOGY

## 2023-04-10 RX ORDER — SODIUM CHLORIDE 0.9 % (FLUSH) 0.9 %
5-40 SYRINGE (ML) INJECTION AS NEEDED
Status: DISPENSED | OUTPATIENT
Start: 2023-04-10 | End: 2023-04-10

## 2023-04-10 RX ORDER — HEPARIN 100 UNIT/ML
500 SYRINGE INTRAVENOUS AS NEEDED
Status: ACTIVE | OUTPATIENT
Start: 2023-04-10 | End: 2023-04-10

## 2023-04-10 RX ADMIN — SODIUM CHLORIDE, PRESERVATIVE FREE 10 ML: 5 INJECTION INTRAVENOUS at 10:38

## 2023-04-10 RX ADMIN — HEPARIN 500 UNITS: 100 SYRINGE at 10:39

## 2023-04-10 NOTE — PROGRESS NOTES
[facility] Progress Note    Date: April 10, 2023    Name: Jameson Phillips    MRN: [id number]         : 1955    Ms. Vicente Arrived ambulatory and in no distress for PF/Labs. Right chest wall port accessed without difficulty, brisk blood return. Ms. Cassi Winters vitals were reviewed. Visit Vitals  /84 (BP 1 Location: Left upper arm, BP Patient Position: Sitting)   Pulse 66   Temp 97.1 °F (36.2 °C)   Resp 16   SpO2 100%   Breastfeeding No     Medications:  Medications Administered       heparin (porcine) pf 500 Units       Admin Date  04/10/2023 Action  Given Dose  500 Units Route  InterCATHeter Administered By  Irina Montero RN              sodium chloride (NS) flush 5-40 mL       Admin Date  04/10/2023 Action  Given Dose  10 mL Route  IntraVENous Administered By  Irina Montero RN                  Ms. Kelechi Cramer tolerated treatment well and was discharged from [place] [address] in stable condition. Port de-accessed, flushed & heparinized per protocol. She is to return on 2023 for her next appointment.     Maribel Brewster RN  April 10, 2023

## 2023-04-17 ENCOUNTER — HOSPITAL ENCOUNTER (OUTPATIENT)
Dept: CT IMAGING | Age: 68
Discharge: HOME | End: 2023-04-17
Attending: OBSTETRICS & GYNECOLOGY
Payer: MEDICARE

## 2023-04-17 DIAGNOSIS — C54.1 CARCINOSARCOMA OF ENDOMETRIUM (HCC): ICD-10-CM

## 2023-04-17 LAB — CREAT BLD-MCNC: 0.7 MG/DL (ref 0.6–1.3)

## 2023-04-17 PROCEDURE — 74011000636 HC RX REV CODE- 636: Performed by: OBSTETRICS & GYNECOLOGY

## 2023-04-17 PROCEDURE — 71260 CT THORAX DX C+: CPT

## 2023-04-17 PROCEDURE — 82565 ASSAY OF CREATININE: CPT

## 2023-04-17 PROCEDURE — 74177 CT ABD & PELVIS W/CONTRAST: CPT

## 2023-04-17 RX ORDER — SODIUM CHLORIDE 9 MG/ML
5-250 INJECTION, SOLUTION INTRAVENOUS AS NEEDED
OUTPATIENT
Start: 2023-05-08

## 2023-04-17 RX ORDER — SODIUM CHLORIDE 9 MG/ML
5-40 INJECTION INTRAVENOUS AS NEEDED
OUTPATIENT
Start: 2023-05-08

## 2023-04-17 RX ORDER — HEPARIN 100 UNIT/ML
500 SYRINGE INTRAVENOUS AS NEEDED
Start: 2023-05-08

## 2023-04-17 RX ORDER — SODIUM CHLORIDE 0.9 % (FLUSH) 0.9 %
5-40 SYRINGE (ML) INJECTION AS NEEDED
OUTPATIENT
Start: 2023-05-08

## 2023-04-17 RX ADMIN — IOPAMIDOL 100 ML: 755 INJECTION, SOLUTION INTRAVENOUS at 10:07

## 2023-04-25 ENCOUNTER — OFFICE VISIT (OUTPATIENT)
Dept: GYNECOLOGY | Age: 68
End: 2023-04-25
Payer: MEDICARE

## 2023-04-25 VITALS
HEIGHT: 63 IN | HEART RATE: 66 BPM | DIASTOLIC BLOOD PRESSURE: 65 MMHG | BODY MASS INDEX: 37.17 KG/M2 | SYSTOLIC BLOOD PRESSURE: 132 MMHG | WEIGHT: 209.8 LBS

## 2023-04-25 DIAGNOSIS — C54.1 MALIGNANT NEOPLASM OF ENDOMETRIUM (HCC): Primary | ICD-10-CM

## 2023-04-25 PROCEDURE — G8417 CALC BMI ABV UP PARAM F/U: HCPCS | Performed by: OBSTETRICS & GYNECOLOGY

## 2023-04-25 PROCEDURE — G0463 HOSPITAL OUTPT CLINIC VISIT: HCPCS | Performed by: OBSTETRICS & GYNECOLOGY

## 2023-04-25 PROCEDURE — 1101F PT FALLS ASSESS-DOCD LE1/YR: CPT | Performed by: OBSTETRICS & GYNECOLOGY

## 2023-04-25 PROCEDURE — 1123F ACP DISCUSS/DSCN MKR DOCD: CPT | Performed by: OBSTETRICS & GYNECOLOGY

## 2023-04-25 PROCEDURE — 1090F PRES/ABSN URINE INCON ASSESS: CPT | Performed by: OBSTETRICS & GYNECOLOGY

## 2023-04-25 PROCEDURE — G8427 DOCREV CUR MEDS BY ELIG CLIN: HCPCS | Performed by: OBSTETRICS & GYNECOLOGY

## 2023-04-25 PROCEDURE — G8432 DEP SCR NOT DOC, RNG: HCPCS | Performed by: OBSTETRICS & GYNECOLOGY

## 2023-04-25 PROCEDURE — 3017F COLORECTAL CA SCREEN DOC REV: CPT | Performed by: OBSTETRICS & GYNECOLOGY

## 2023-04-25 PROCEDURE — 99214 OFFICE O/P EST MOD 30 MIN: CPT | Performed by: OBSTETRICS & GYNECOLOGY

## 2023-04-25 PROCEDURE — G8536 NO DOC ELDER MAL SCRN: HCPCS | Performed by: OBSTETRICS & GYNECOLOGY

## 2023-04-25 PROCEDURE — G8400 PT W/DXA NO RESULTS DOC: HCPCS | Performed by: OBSTETRICS & GYNECOLOGY

## 2023-04-25 NOTE — PROGRESS NOTES
[address] [place] [address] [address]ias [address], [address]  P [phone]  F [phone]    Office Note  Patient ID:  Name:  Mary Beth Melendez  MRN:  [id number]  :  1955/67 y.o. Date:  2023      HISTORY OF PRESENT ILLNESS:  Ms. Mary Beth Melendez is a 69 y.o. 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 On 2022 underwent Robotic-assisted total laparoscopic hysterectomy, Bilateral salpingo-oophorectomy, Bilateral pelvic sentinel lymph node mapping and biopsy. 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 4mm out of 15 myometrial invasion. Negative washings. Negative SLNs. Negative CT C/A/P 3/23/2022. Initiated on adjuvant carboplatin AUC 6 + paclitaxel 175mg/m2 on 2022. Completed cycle 6 on 2022. Completed VBT on 2022. CT C/A/P 2022 negative for disease. Presents today for continued surveillance. Doing well without complaints. Denies vaginal bleeding/discharge, abdominal/pelvic pain, nausea, vomiting, constipation, diarrhea, CP, SOB, hematuria, hematochezia, change in appetite or bowel movements, bloating, fevers, chills, or urinary symptoms. Initial History:  Ms. Mary Beth Melendez is a 69 y.o.  postmenopausal female who presents as a new patient from Dr. Alejandro Carrizales for carcinosarcoma of the endometrium. The patient reported vaginal spotting/bleeding in February (). She underwent a pelvic ultrasound and endometrial biopsy with Dr. Alejandro Carrizales, which ultimately demonstrated carcinosarcoma. The patient denies pelvic or abdominal pain/bloating. Denies CP or SOB. Denies hematuria or hematochezia. She is without any other complaints. Pathology Review:   2022:  * * *FINAL PATHOLOGIC DIAGNOSIS* * *   1. Right pelvic sentinel lymph node, sentinel lymph node biopsy:        One benign lymph node, no tumor seen (0/1)   2.   Left pelvic sentinel lymph node, sentinel lymph node biopsy:        Two benign lymph nodes, no tumor seen (0/2)   3. 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 (focal chondroid elements) with high grade epithelial component,   high grade serous carcinoma and minor component of clear cell carcinoma   (<5%)   Vascular invasion is identified   Mild chronic cervicitis, no tumor seen   Benign endocervical polyp with adjacent detached fragments carcinosarcoma   (See comment)   Bilateral ovaries, no tumor seen   Bilateral fallopian tubes, no tumor seen   Leiomyoma uteri   ENDOMETRIUM    SPECIMEN       Procedure: Total hysterectomy and bilateral salpingo-oophorectomy   TUMOR       Histologic Type: Carcinosarcoma (malignant mixed Müllerian tumor) with   high grade serous    carcinoma, and minor component of clear cell carcinoma       Histologic Grade: High Grade       Myometrial Invasion: Cannot be determined with certainty: The sample   is received in         pieces, so definitive depth of invasion cannot be rendered. The   deepest myometrial invasion of the         intact pieces of uterus measures 4mm of 15mm       Uterine Serosa Involvement: Not identified       Cervical Stromal Involvement: Not identified       Other Tissue / Organ Involvement: Not identified       Lymphovascular Invasion: Not identified   MARGINS       Margins: Not applicable   LYMPH NODES       Lymph Node Status:  All lymph nodes negative for tumor cells           Total Number of Pelvic Nodes Examined:           3           Number of Pelvic Corona Nodes Examined:           3           Total Number of Para-aortic Nodes Examined:      0           Number of Para-aortic Corona Nodes Examined:      0    PATHOLOGIC STAGE CLASSIFICATION (pTNM, AJCC 8th Edition)       Primary Tumor (pT):                          pT1b (based on intact   pieces of uterus)       Regional Lymph Nodes Modifier:                (sn)       Regional Lymph Nodes (pN):                     pN0   * * *Comment* * *   Due to the fragmented nature of the specimen upon receipt in the   laboratory, the presence of detached fragments of tumor on the slide   adjacent to sections of the endocervical polyp, are favored to represent   contamination, rather than true endocervical involvement. Immunohistochemical stains for pancytokeratin performed on the sentinel   lymph nodes (specimen #1(1A,1B,1F) and specimen #2(2A,2B, and 2C) are   negative for metastatic carcinoma cells. Additional immunohistochemical stains performed on a section of tumor   reveal the following staining pattern:   Estrogen Receptor: Negative   P63: Focal nuclear decoration of occasional basal epithelial cells   Desmin: Negative   Pankeratin(AE1/3): Diffuse epithelial cytoplasmic membrane decoration of   tumor cells   These findings support the diagnosis. 3/10/2022:  Specimen A: Endometrial biopsy: Features consistent with malignant mixed mesodermal tumor (carcinosarcoma) with an epithelial portion component of serous carcinoma. Imaging Review:  CT C/A/P 4/17/2023:  COMPARISON: 9/26/2022. CHEST:   The lungs remain clear. There is no significant mediastinal or hilar adenopathy. There is no pleural or pericardial effusion. Aorta is not aneurysmal. There is  mild coronary artery calcification. Central pulmonary arteries are free of  thrombus. ABDOMEN PELVIS:  There is no inflammation, ascites, free air or significant adenopathy. Liver is  uniform in texture. There is cholelithiasis. Bile ducts and spleen are not  enlarged. Pancreas shows no mass or inflammation. Adrenal glands are normal in  size. Kidneys show no mass or hydronephrosis. Aorta shows no aneurysm. Status post hysterectomy. No fluid collection. The bladder is not distended. The  distal ureters are not dilated. There is no pelvic mass.  Bowels show no acute  finding. There are colonic diverticula. The appendix is normal.  IMPRESSION  No tumor/metastasis/acute finding or significant change. CT C/A/P 9/26/2022:  FINDINGS:  There is a right chest portacatheter. THYROID: No nodule. MEDIASTINUM: No mass or lymphadenopathy. KARL: No mass or lymphadenopathy. THORACIC AORTA: No aneurysm. MAIN PULMONARY ARTERY: Normal in caliber. HEART: Normal in size. ESOPHAGUS: No wall thickening or dilatation. TRACHEA/BRONCHI: Patent. PLEURA: No effusion or pneumothorax. LUNGS: No nodule, mass, or airspace disease. Liver: No mass or biliary dilatation. Biliary tree: There is a peripherally calcified gallstone within the gallbladder  measuring 3 cm. The CBD is not dilated. Spleen: Within normal limits. Pancreas: No inflammation, mass or ductal dilatation. Adrenals: Unremarkable. Kidneys: No mass or hydronephrosis. Stomach: Unremarkable. Small bowel: No dilatation or wall thickening. Colon: Diverticulosis of the colon, with no evidence of acute diverticulitis. Appendix: Normal  Peritoneum: No ascites or pneumoperitoneum. Retroperitoneum: No lymphadenopathy or aortic aneurysm. Diffuse calcific aortic  atherosclerosis. Reproductive organs: Status post hysterectomy. No pelvic free fluid or mass. Urinary bladder: No mass or calculus. Bones: No destructive bone lesion. Abdominal wall: No mass or hernia. Additional comments: N/A  IMPRESSION  1. Expected postsurgical changes of interval hysterectomy. 2. No evidence of metastatic disease within the chest, abdomen, or pelvis. 3. Cholelithiasis. 4. Diverticulosis of the colon. CT C/A/P 3/23/2022:  FINDINGS:   THYROID: No nodule. MEDIASTINUM: No mass or lymphadenopathy. KARL: No mass or lymphadenopathy. THORACIC AORTA: No dissection or aneurysm. MAIN PULMONARY ARTERY: Normal in caliber. TRACHEA/BRONCHI: Patent. ESOPHAGUS: No wall thickening or dilatation. HEART: Normal in size.   PLEURA: No effusion or pneumothorax. LUNGS: No nodule, mass, or airspace disease. Incidentally noted is a 2 mm  subpleural nodule right upper lobe image 46 series 4  LIVER: No mass or biliary dilatation. There is hepatic steatosis  GALLBLADDER: There is a gallstone  SPLEEN: No mass. PANCREAS: No mass or ductal dilatation. ADRENALS: There is a 1 cm left adrenal nodule  KIDNEYS: No mass, calculus, or hydronephrosis. STOMACH: There is thickening of the body of the stomach most likely related to  incomplete distention  SMALL BOWEL: No dilatation or wall thickening. COLON: No dilatation or wall thickening. APPENDIX: Unremarkable. PERITONEUM: No ascites or pneumoperitoneum. RETROPERITONEUM: No lymphadenopathy or aortic aneurysm. REPRODUCTIVE ORGANS: The endometrium is thickened measuring up to 5.3 cm. There  is suspicion of a 3.2 cm enhancing lesion in the anterior uterine myometrium and  a 2.8 cm enhancing lesion right uterine myometrium  URINARY BLADDER: Incompletely distended  BONES: No destructive bone lesion. There are small low densities at T1/T2  bilaterally consistent with small lateral meningocele. There is slight scoliosis  levoconvex lumbar spine with 3 mm of anterolisthesis of L4 on L5 and  degenerative changes mid to lower lumbar spine  ADDITIONAL COMMENTS: N/A  IMPRESSION  1. CT of the chest demonstrates no definite evidence of metastatic disease. 2. CT of the abdomen and pelvis demonstrates the endometrium to be thickened  measuring 5.3 cm. There is suspicion of 2 enhancing lesions in the uterine  myometrium. No adenopathy or mass is identified to suggest metastatic disease. There is a 1 cm nodule in the left adrenal gland which may represent a small  adenoma    Pelvic ultrasound 3/14/2022:  Impression: Multi-fibroid uterus. Possible fibroid versus polyp in uterine cavity. Ovaries are not visualized. ROS:  A comprehensive review of systems was negative except for that written in the History of Present Illness.  , 10 point ROS      ECOG ndGndrndanddndend:nd nd2nd Problem List:  Patient Active Problem List    Diagnosis Date Noted    On antineoplastic chemotherapy 05/24/2022    Endometrial cancer ([facility] [address] 04/07/2022    Carcinosarcoma of endometrium ([facility] [address] 03/22/2022    PMB (postmenopausal bleeding)     Obesity     Malignant neoplasm of uterus ([address] 03/10/2022     PMH:  Past Medical History:   Diagnosis Date    Arthritis 2005    Enlarged uterus     Malignant neoplasm of uterus ([facility] [address] 03/10/2022    endometrial biopsy done on 3/10/22 by Dr. Damon Mcfadden    Obesity     PMB (postmenopausal bleeding)       PSH:  Past Surgical History:   Procedure Laterality Date    HX COLONOSCOPY      HX ORTHOPAEDIC Bilateral 2006/2017    knee replacement X2    HX OTHER SURGICAL  2016    repair of retina    HX TONSILLECTOMY      HX TUBAL LIGATION  1981    IR INSERT TUNL CVC W PORT OVER 5 YEARS  5/4/2022      Social History:  Social History     Tobacco Use    Smoking status: Never    Smokeless tobacco: Never   Substance Use Topics    Alcohol use: Yes     Alcohol/week: 7.0 standard drinks     Types: 7 Glasses of wine per week      Family History:  Family History   Problem Relation Age of Onset    Cancer Father         Bladder    Anesth Problems Neg Hx       Medications: (reviewed)  Current Outpatient Medications   Medication Sig    vit C/Zn gluc/herbal no. 325 (ELDERBERRY ZINC VIT C MM) by Mucous Membrane route daily. cholecalciferol, vitamin D3, (VITAMIN D3 PO) Take  by mouth. ubidecarenone (COQ-10 PO) Take  by mouth. KRILL OIL PO Take  by mouth. MULTIVITAMIN PO Take  by mouth. B.animalis,bifid,infantis,long (Probiotic 4X) 10-15 mg TbEC Take  by mouth.    lidocaine-prilocaine (EMLA) topical cream Apply small amount over port area and cover with band aid one hour before chemo (Patient not taking: Reported on 1/17/2023)     No current facility-administered medications for this visit.      Allergies: (reviewed)  Allergies   Allergen Reactions    Pcn [Penicillins] Rash     Patient screened for any delayed non-IgE-mediated reaction to PCN. Patient notes the following:    No delayed non-IgE-mediated reaction to PCN    ALLERGY AT 13YEARS OF AGE-NO HOSPITALIZATION               Physical Exam:  Visit Vitals  /65 (BP 1 Location: Left upper arm, BP Patient Position: Sitting)   Pulse 66   Ht 5' 3\" (1.6 m)   Wt 209 lb 12.8 oz (95.2 kg)   BMI 37.16 kg/m²      General: Alert and oriented. No acute distress. Well-nourished  HEENT: No thyroid enlargment. Neck supple without restrictions. Sclera normal. Normal occular motion. Moist mucous membranes. Lymphatics: No evidence of axillary, cervical, or subclavicular adenopathy. Respiratory: clear to auscultation and percussion to the bases. No CVAT. Cardiovascular: regular rate and rhythm. No murmurs, rubs, or gallops. Gastrointestinal: soft, non-tender, non-distended, no masses or organomegaly. Well-healed incision. Musculoskeletal: normal gait. No joint tenderness, deformity or swelling. No muscular tenderness. Extremities: extremities normal, atraumatic, no cyanosis or edema. Pelvic: exam chaperoned by nurse. Normal appearing external genitalia. On speculum exam, the vagina is atrophic. The uterus and cervix are surgically absent. No evidence of masses or nodularity on bimanual exam. Deferred rectovaginal exam.   Neuro: Grossly intact. Normal gait and movement. No acute deficit  Skin: No evidence of rashes or skin changes.      Lab Date as available:    Lab Results   Component Value Date/Time    WBC 3.8 08/26/2022 09:20 AM    HGB 10.6 (L) 08/26/2022 09:20 AM    HCT 30.2 (L) 08/26/2022 09:20 AM    PLATELET 941 84/72/4581 09:20 AM    MCV 97 08/26/2022 09:20 AM     Lab Results   Component Value Date/Time    Sodium 143 08/26/2022 09:20 AM    Potassium 4.3 08/26/2022 09:20 AM    Chloride 106 08/26/2022 09:20 AM    CO2 23 08/26/2022 09:20 AM    Anion gap 8 08/09/2022 10:15 AM    Glucose 113 (H) 08/26/2022 09:20 AM    BUN 15 08/26/2022 09:20 AM    Creatinine 0.65 08/26/2022 09:20 AM    BUN/Creatinine ratio 23 08/26/2022 09:20 AM    GFR est AA >60 08/09/2022 10:15 AM    GFR est non-AA >60 08/09/2022 10:15 AM    Calcium 9.3 08/26/2022 09:20 AM         IMPRESSION/PLAN:    Ms. Cleo Eubanks is a 69 y.o. 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 On 4/7/2022 underwent Robotic-assisted total laparoscopic hysterectomy, Bilateral salpingo-oophorectomy, Bilateral pelvic sentinel lymph node mapping and biopsy. 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 4mm out of 15 myometrial invasion. Negative washings. Negative SLNs. Negative CT C/A/P 3/23/2022. Initiated on adjuvant carboplatin AUC 6 + paclitaxel 175mg/m2 on 5/16/2022. Completed cycle 6 on 8/30/2022. Completed VBT on 11/2022. CT C/A/P 9/26/2022 and 4/17/2023 negative for disease. Problems:     Patient Active Problem List    Diagnosis Date Noted    On antineoplastic chemotherapy 05/24/2022    Endometrial cancer ([facility] [address] 04/07/2022    Carcinosarcoma of endometrium ([facility] [address] 03/22/2022    PMB (postmenopausal bleeding)     Obesity     Malignant neoplasm of uterus ([facility] [address] 03/10/2022     Reviewed patient's course to date. Initiated on adjuvant carboplatin AUC 6 + paclitaxel 175mg/m2 on 5/16/2022. Completed cycle 6 on 8/30/2022. Completed VBT on 11/2022. CT C/A/P 9/26/2022 and 4/17/2023 negative for disease. RTC in 3 months for continued surveillance. Reviewed precautionary symptoms to return sooner. All questions and concerns were addressed with the patient and she is comfortable with the plan. An electronic signature was used to authenticate this note.      Velia Baca MD

## 2023-05-05 RX ORDER — SODIUM CHLORIDE 9 MG/ML
25 INJECTION, SOLUTION INTRAVENOUS PRN
OUTPATIENT
Start: 2023-05-11

## 2023-05-05 RX ORDER — HEPARIN SODIUM (PORCINE) LOCK FLUSH IV SOLN 100 UNIT/ML 100 UNIT/ML
500 SOLUTION INTRAVENOUS PRN
OUTPATIENT
Start: 2023-05-11

## 2023-05-05 RX ORDER — SODIUM CHLORIDE 0.9 % (FLUSH) 0.9 %
5-40 SYRINGE (ML) INJECTION PRN
OUTPATIENT
Start: 2023-05-11

## 2023-05-08 ENCOUNTER — APPOINTMENT (OUTPATIENT)
Dept: INFUSION THERAPY | Age: 68
End: 2023-05-08

## 2023-05-11 ENCOUNTER — HOSPITAL ENCOUNTER (OUTPATIENT)
Dept: INFUSION THERAPY | Age: 68
End: 2023-05-11

## 2023-05-11 ENCOUNTER — HOSPITAL ENCOUNTER (OUTPATIENT)
Facility: HOSPITAL | Age: 68
Setting detail: INFUSION SERIES
End: 2023-05-11
Payer: MEDICARE

## 2023-05-11 VITALS
DIASTOLIC BLOOD PRESSURE: 85 MMHG | BODY MASS INDEX: 37.78 KG/M2 | TEMPERATURE: 97.9 F | WEIGHT: 213.2 LBS | OXYGEN SATURATION: 99 % | HEIGHT: 63 IN | SYSTOLIC BLOOD PRESSURE: 129 MMHG | HEART RATE: 63 BPM | RESPIRATION RATE: 18 BRPM

## 2023-05-11 DIAGNOSIS — C54.1 ENDOMETRIAL CANCER (HCC): Primary | ICD-10-CM

## 2023-05-11 PROCEDURE — 96523 IRRIG DRUG DELIVERY DEVICE: CPT

## 2023-05-11 PROCEDURE — 2580000003 HC RX 258: Performed by: PHYSICIAN ASSISTANT

## 2023-05-11 PROCEDURE — 6360000002 HC RX W HCPCS: Performed by: PHYSICIAN ASSISTANT

## 2023-05-11 RX ORDER — SODIUM CHLORIDE 9 MG/ML
25 INJECTION, SOLUTION INTRAVENOUS PRN
Status: CANCELLED | OUTPATIENT
Start: 2023-06-08

## 2023-05-11 RX ORDER — HEPARIN SODIUM (PORCINE) LOCK FLUSH IV SOLN 100 UNIT/ML 100 UNIT/ML
500 SOLUTION INTRAVENOUS PRN
Status: CANCELLED | OUTPATIENT
Start: 2023-06-08

## 2023-05-11 RX ORDER — SODIUM CHLORIDE 0.9 % (FLUSH) 0.9 %
5-40 SYRINGE (ML) INJECTION PRN
Status: CANCELLED | OUTPATIENT
Start: 2023-06-08

## 2023-05-11 RX ORDER — SODIUM CHLORIDE 0.9 % (FLUSH) 0.9 %
5-40 SYRINGE (ML) INJECTION PRN
Status: DISCONTINUED | OUTPATIENT
Start: 2023-05-11 | End: 2023-05-12 | Stop reason: HOSPADM

## 2023-05-11 RX ORDER — CHOLECALCIFEROL (VITAMIN D3) 10(400)/ML
DROPS ORAL
COMMUNITY

## 2023-05-11 RX ORDER — LIDOCAINE AND PRILOCAINE 25; 25 MG/G; MG/G
CREAM TOPICAL
COMMUNITY
Start: 2022-05-02

## 2023-05-11 RX ORDER — HEPARIN SODIUM (PORCINE) LOCK FLUSH IV SOLN 100 UNIT/ML 100 UNIT/ML
500 SOLUTION INTRAVENOUS PRN
Status: DISCONTINUED | OUTPATIENT
Start: 2023-05-11 | End: 2023-05-12 | Stop reason: HOSPADM

## 2023-05-11 RX ADMIN — SODIUM CHLORIDE, PRESERVATIVE FREE 20 ML: 5 INJECTION INTRAVENOUS at 10:16

## 2023-05-11 RX ADMIN — Medication 500 UNITS: at 10:16

## 2023-05-11 NOTE — PROGRESS NOTES
[facility] Note:  Arrived - 1 for Port Flush     Patient denied having any symptoms of COVID-19, i.e. SOB, coughing, fever, or generally not feeling well. /85   Pulse 63   Temp 97.9 °F (36.6 °C) (Temporal)   Resp 18   Ht 1.6 m (5' 3\")   Wt 96.7 kg (213 lb 3.2 oz)   SpO2 99%   BMI 37.77 kg/m²     Port accessed, positive blood return noted, & flushed per protocol w/o difficulty. Benavides needle removed. 1015 - Tolerated well. Pt denies any acute problems/changes. Discharged from [facility] ambulatory. No distress. Next appt: 6/8/23 @ 1000.

## 2023-06-08 ENCOUNTER — HOSPITAL ENCOUNTER (OUTPATIENT)
Facility: HOSPITAL | Age: 68
Setting detail: INFUSION SERIES
End: 2023-06-08
Payer: MEDICARE

## 2023-06-08 VITALS
WEIGHT: 208.9 LBS | BODY MASS INDEX: 37.02 KG/M2 | OXYGEN SATURATION: 97 % | DIASTOLIC BLOOD PRESSURE: 80 MMHG | HEART RATE: 66 BPM | TEMPERATURE: 98.1 F | SYSTOLIC BLOOD PRESSURE: 149 MMHG | HEIGHT: 63 IN | RESPIRATION RATE: 16 BRPM

## 2023-06-08 DIAGNOSIS — C54.1 ENDOMETRIAL CANCER (HCC): Primary | ICD-10-CM

## 2023-06-08 PROCEDURE — 96523 IRRIG DRUG DELIVERY DEVICE: CPT

## 2023-06-08 PROCEDURE — 2580000003 HC RX 258: Performed by: PHYSICIAN ASSISTANT

## 2023-06-08 RX ORDER — SODIUM CHLORIDE 9 MG/ML
25 INJECTION, SOLUTION INTRAVENOUS PRN
Status: CANCELLED | OUTPATIENT
Start: 2023-07-06

## 2023-06-08 RX ORDER — SODIUM CHLORIDE 0.9 % (FLUSH) 0.9 %
5-40 SYRINGE (ML) INJECTION PRN
Status: CANCELLED | OUTPATIENT
Start: 2023-07-06

## 2023-06-08 RX ORDER — HEPARIN 100 UNIT/ML
500 SYRINGE INTRAVENOUS PRN
Status: CANCELLED | OUTPATIENT
Start: 2023-07-06

## 2023-06-08 RX ORDER — SODIUM CHLORIDE 0.9 % (FLUSH) 0.9 %
5-40 SYRINGE (ML) INJECTION PRN
Status: DISCONTINUED | OUTPATIENT
Start: 2023-06-08 | End: 2023-06-09 | Stop reason: HOSPADM

## 2023-06-08 RX ADMIN — SODIUM CHLORIDE, PRESERVATIVE FREE 10 ML: 5 INJECTION INTRAVENOUS at 09:50

## 2023-06-08 RX ADMIN — SODIUM CHLORIDE, PRESERVATIVE FREE 10 ML: 5 INJECTION INTRAVENOUS at 09:51

## 2023-06-08 NOTE — PROGRESS NOTES
[facility] Note:    Arrived for Port Flush     BP (!) 149/80   Pulse 66   Temp 98.1 °F (36.7 °C)   Resp 16   Ht 1.6 m (5' 3\")   Wt 94.8 kg (208 lb 14.4 oz)   SpO2 97%   BMI 37.00 kg/m²     Port accessed, positive blood return noted, & flushed per protocol w/o difficulty. Benavides needle removed. Tolerated well. Pt denies any acute problems/changes. Discharged from [facility] ambulatory. No distress.  Next appt: 7/6/2023

## 2023-07-06 ENCOUNTER — APPOINTMENT (OUTPATIENT)
Dept: INFUSION THERAPY | Age: 68
End: 2023-07-06

## 2023-07-06 ENCOUNTER — HOSPITAL ENCOUNTER (OUTPATIENT)
Facility: HOSPITAL | Age: 68
Setting detail: INFUSION SERIES
End: 2023-07-06
Payer: MEDICARE

## 2023-07-06 VITALS
SYSTOLIC BLOOD PRESSURE: 131 MMHG | HEART RATE: 62 BPM | HEIGHT: 63 IN | TEMPERATURE: 97.2 F | BODY MASS INDEX: 37.76 KG/M2 | OXYGEN SATURATION: 98 % | DIASTOLIC BLOOD PRESSURE: 88 MMHG | WEIGHT: 213.1 LBS | RESPIRATION RATE: 16 BRPM

## 2023-07-06 DIAGNOSIS — C54.1 ENDOMETRIAL CANCER (HCC): Primary | ICD-10-CM

## 2023-07-06 PROCEDURE — 96523 IRRIG DRUG DELIVERY DEVICE: CPT

## 2023-07-06 PROCEDURE — 2580000003 HC RX 258: Performed by: PHYSICIAN ASSISTANT

## 2023-07-06 RX ORDER — SODIUM CHLORIDE 0.9 % (FLUSH) 0.9 %
5-40 SYRINGE (ML) INJECTION PRN
OUTPATIENT
Start: 2023-08-03

## 2023-07-06 RX ORDER — HEPARIN 100 UNIT/ML
500 SYRINGE INTRAVENOUS PRN
Status: DISCONTINUED | OUTPATIENT
Start: 2023-07-06 | End: 2023-07-07 | Stop reason: HOSPADM

## 2023-07-06 RX ORDER — SODIUM CHLORIDE 9 MG/ML
25 INJECTION, SOLUTION INTRAVENOUS PRN
OUTPATIENT
Start: 2023-08-03

## 2023-07-06 RX ORDER — SODIUM CHLORIDE 0.9 % (FLUSH) 0.9 %
5-40 SYRINGE (ML) INJECTION PRN
Status: DISCONTINUED | OUTPATIENT
Start: 2023-07-06 | End: 2023-07-07 | Stop reason: HOSPADM

## 2023-07-06 RX ORDER — HEPARIN 100 UNIT/ML
500 SYRINGE INTRAVENOUS PRN
OUTPATIENT
Start: 2023-08-03

## 2023-07-06 RX ORDER — SODIUM CHLORIDE 9 MG/ML
25 INJECTION, SOLUTION INTRAVENOUS PRN
Status: DISCONTINUED | OUTPATIENT
Start: 2023-07-06 | End: 2023-07-07 | Stop reason: HOSPADM

## 2023-07-06 RX ADMIN — SODIUM CHLORIDE, PRESERVATIVE FREE 10 ML: 5 INJECTION INTRAVENOUS at 09:59

## 2023-07-06 RX ADMIN — SODIUM CHLORIDE, PRESERVATIVE FREE 10 ML: 5 INJECTION INTRAVENOUS at 09:58

## 2023-07-06 ASSESSMENT — PAIN SCALES - GENERAL: PAINLEVEL_OUTOF10: 0

## 2023-07-26 ENCOUNTER — OFFICE VISIT (OUTPATIENT)
Age: 68
End: 2023-07-26
Payer: MEDICARE

## 2023-07-26 VITALS
HEIGHT: 63 IN | HEART RATE: 66 BPM | SYSTOLIC BLOOD PRESSURE: 141 MMHG | BODY MASS INDEX: 37.81 KG/M2 | WEIGHT: 213.4 LBS | DIASTOLIC BLOOD PRESSURE: 83 MMHG

## 2023-07-26 DIAGNOSIS — C54.1 CARCINOSARCOMA OF ENDOMETRIUM (HCC): Primary | ICD-10-CM

## 2023-07-26 PROCEDURE — G8427 DOCREV CUR MEDS BY ELIG CLIN: HCPCS | Performed by: OBSTETRICS & GYNECOLOGY

## 2023-07-26 PROCEDURE — 1090F PRES/ABSN URINE INCON ASSESS: CPT | Performed by: OBSTETRICS & GYNECOLOGY

## 2023-07-26 PROCEDURE — 3017F COLORECTAL CA SCREEN DOC REV: CPT | Performed by: OBSTETRICS & GYNECOLOGY

## 2023-07-26 PROCEDURE — G8400 PT W/DXA NO RESULTS DOC: HCPCS | Performed by: OBSTETRICS & GYNECOLOGY

## 2023-07-26 PROCEDURE — 99214 OFFICE O/P EST MOD 30 MIN: CPT | Performed by: OBSTETRICS & GYNECOLOGY

## 2023-07-26 PROCEDURE — 1036F TOBACCO NON-USER: CPT | Performed by: OBSTETRICS & GYNECOLOGY

## 2023-07-26 PROCEDURE — G8417 CALC BMI ABV UP PARAM F/U: HCPCS | Performed by: OBSTETRICS & GYNECOLOGY

## 2023-07-26 PROCEDURE — 1123F ACP DISCUSS/DSCN MKR DOCD: CPT | Performed by: OBSTETRICS & GYNECOLOGY

## 2023-07-26 NOTE — PROGRESS NOTES
3 month follow up for endometrial cancer ,  pt reports no abnormal spotting or bleeding, pt states no questions or concerns for today's visit    1. Have you been to the ER, urgent care clinic since your last visit? Hospitalized since your last visit?  no    2. Have you seen or consulted any other health care providers outside of the [address] [place] [address] Avenue since your last visit? Include any pap smears or colon screening.    no
pT1b (based on intact   pieces of uterus)       Regional Lymph Nodes Modifier:                (sn)       Regional  Lymph Nodes (pN):                     pN0   * * *Comment* * *   Due to the fragmented nature of the specimen upon receipt in the   laboratory, the presence of detached fragments of tumor on the slide   adjacent to sections of  the endocervical polyp, are favored to represent   contamination, rather than true endocervical involvement. Immunohistochemical stains for pancytokeratin performed on the sentinel   lymph nodes (specimen #1(1A,1B,1F) and specimen #2(2A,2B,  and 2C) are   negative for metastatic carcinoma cells. Additional immunohistochemical stains performed on a section of tumor   reveal the following staining pattern:   Estrogen Receptor: Negative   P63: Focal nuclear decoration of  occasional basal epithelial cells   Desmin: Negative   Pankeratin(AE1/3): Diffuse epithelial cytoplasmic membrane decoration of   tumor cells   These findings support the diagnosis. 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 Imaging Review:   CT C/A/P 4/17/2023:   COMPARISON: 9/26/2022. CHEST:    The lungs remain clear. There is no significant mediastinal or hilar adenopathy. There is no pleural or pericardial effusion. Aorta is not aneurysmal. There is   mild coronary artery calcification. Central pulmonary arteries are free of   thrombus. ABDOMEN PELVIS:   There is no inflammation, ascites, free air or significant adenopathy. Liver is   uniform in texture. There is cholelithiasis. Bile ducts and spleen are not   enlarged. Pancreas shows no mass or inflammation. Adrenal glands are normal in   size. Kidneys show no mass or hydronephrosis. Aorta shows no aneurysm. Status post hysterectomy. No fluid collection. The bladder is not distended.  The   distal

## 2023-08-03 ENCOUNTER — APPOINTMENT (OUTPATIENT)
Dept: INFUSION THERAPY | Age: 68
End: 2023-08-03

## 2023-08-17 ENCOUNTER — HOSPITAL ENCOUNTER (OUTPATIENT)
Facility: HOSPITAL | Age: 68
Discharge: HOME | End: 2023-08-17
Attending: OBSTETRICS & GYNECOLOGY
Payer: MEDICARE

## 2023-08-17 VITALS
RESPIRATION RATE: 15 BRPM | DIASTOLIC BLOOD PRESSURE: 60 MMHG | HEIGHT: 62 IN | OXYGEN SATURATION: 96 % | HEART RATE: 66 BPM | WEIGHT: 209 LBS | SYSTOLIC BLOOD PRESSURE: 120 MMHG | TEMPERATURE: 98 F | BODY MASS INDEX: 38.46 KG/M2

## 2023-08-17 DIAGNOSIS — C54.1 CARCINOSARCOMA OF ENDOMETRIUM (HCC): ICD-10-CM

## 2023-08-17 PROCEDURE — 2500000003 HC RX 250 WO HCPCS: Performed by: STUDENT IN AN ORGANIZED HEALTH CARE EDUCATION/TRAINING PROGRAM

## 2023-08-17 PROCEDURE — 6360000002 HC RX W HCPCS

## 2023-08-17 PROCEDURE — 2709999900 IR REMOVE TUNNELED CVAD WO SQ PORT/PUMP

## 2023-08-17 PROCEDURE — 6360000002 HC RX W HCPCS: Performed by: STUDENT IN AN ORGANIZED HEALTH CARE EDUCATION/TRAINING PROGRAM

## 2023-08-17 RX ORDER — MIDAZOLAM HYDROCHLORIDE 1 MG/ML
5 INJECTION, SOLUTION INTRAMUSCULAR; INTRAVENOUS AS NEEDED
Status: DISCONTINUED | OUTPATIENT
Start: 2023-08-17 | End: 2023-08-21 | Stop reason: HOSPADM

## 2023-08-17 RX ORDER — LIDOCAINE HYDROCHLORIDE AND EPINEPHRINE 10; 10 MG/ML; UG/ML
20 INJECTION, SOLUTION INFILTRATION; PERINEURAL ONCE
Status: COMPLETED | OUTPATIENT
Start: 2023-08-17 | End: 2023-08-17

## 2023-08-17 RX ORDER — HEPARIN SODIUM 200 [USP'U]/100ML
200 INJECTION, SOLUTION INTRAVENOUS ONCE
Status: COMPLETED | OUTPATIENT
Start: 2023-08-17 | End: 2023-08-17

## 2023-08-17 RX ORDER — FENTANYL CITRATE 50 UG/ML
100 INJECTION, SOLUTION INTRAMUSCULAR; INTRAVENOUS
Status: DISCONTINUED | OUTPATIENT
Start: 2023-08-17 | End: 2023-08-21 | Stop reason: HOSPADM

## 2023-08-17 RX ORDER — FENTANYL CITRATE 50 UG/ML
INJECTION, SOLUTION INTRAMUSCULAR; INTRAVENOUS PRN
Status: COMPLETED | OUTPATIENT
Start: 2023-08-17 | End: 2023-08-17

## 2023-08-17 RX ORDER — MIDAZOLAM HYDROCHLORIDE 1 MG/ML
INJECTION, SOLUTION INTRAMUSCULAR; INTRAVENOUS PRN
Status: COMPLETED | OUTPATIENT
Start: 2023-08-17 | End: 2023-08-17

## 2023-08-17 RX ADMIN — HEPARIN SODIUM IN SODIUM CHLORIDE 400 UNITS: 200 INJECTION INTRAVENOUS at 08:38

## 2023-08-17 RX ADMIN — MIDAZOLAM HYDROCHLORIDE 3 MG: 1 INJECTION, SOLUTION INTRAMUSCULAR; INTRAVENOUS at 08:25

## 2023-08-17 RX ADMIN — FENTANYL CITRATE 50 MCG: 50 INJECTION, SOLUTION INTRAMUSCULAR; INTRAVENOUS at 08:26

## 2023-08-17 RX ADMIN — FENTANYL CITRATE 25 MCG: 50 INJECTION, SOLUTION INTRAMUSCULAR; INTRAVENOUS at 08:31

## 2023-08-17 RX ADMIN — LIDOCAINE HYDROCHLORIDE AND EPINEPHRINE 20 ML: 10; 10 INJECTION, SOLUTION INFILTRATION; PERINEURAL at 08:37

## 2023-08-17 ASSESSMENT — PAIN - FUNCTIONAL ASSESSMENT: PAIN_FUNCTIONAL_ASSESSMENT: NONE - DENIES PAIN

## 2023-08-17 NOTE — DISCHARGE INSTRUCTIONS
[facility]  Special Procedures/Angiography Department    Radiologist:       Date:        Portacath Removal Discharge Instructions      Watch for signs of infection:  redness, fever, chills, increased pain, and/or drainage from the site. If this occurs, call your physician at once. Return next week for an incision check:    Do not sign in. Go to the podium, and ask them to call our department. Please try to come between 9:00AM and 1:00PM    Keep your dressing clean and dry. Leave the dressing in place for the next  three days    Resume your previous diet and follow medication reconciliation form. Do not lift anything heavier than 5 pounds with the affected arm for the next week. You may take Tylenol, as directed on the label, for pain. Avoid ibuprofen (Advil, Motrin) and aspirin as they may cause you to bleed. Because you received sedation, you are not to drive or sign any legal documents for the next 24 hours.       If you have any questions or concerns, please call [phone] and ask for the nurse on-call

## 2023-08-17 NOTE — PROGRESS NOTES
Patient arrived to  ambulatory AxO4. VSS and in no apparent distress at this time. Informed consent obtained by Giselle Alvarez NP. Patient given the opportunity to ask questions and all concerns were addressed at this time. Patient states there is no history of MRSA,C. Diff,VRE, and TB     0840- Name of Procedure: Port Removal     Sedation medications given:      Versed: 3 mg     Fentanyl:  75 mcg     Sedation Tolerated: Well     Total Sedation Time: 15 minutes     Sedation Start: 0825  Sedation End: 0840     Vital Signs:  VSS     Any complications related to procedure: none identified at this time    This patient is at an increased risk of falling because they have received sedating medications. Please evaluate and implement fall precautions/fall prevention practices as appropriate. 7131- Discharge instructions reviewed with patient. Patient verbalizes understanding. Discharged from IR via wheelchair in stable condition. Pt released with family member via wheelchair.

## 2023-10-25 ENCOUNTER — OFFICE VISIT (OUTPATIENT)
Age: 68
End: 2023-10-25
Payer: MEDICARE

## 2023-10-25 VITALS
BODY MASS INDEX: 39.08 KG/M2 | HEART RATE: 73 BPM | DIASTOLIC BLOOD PRESSURE: 96 MMHG | HEIGHT: 62 IN | SYSTOLIC BLOOD PRESSURE: 157 MMHG | WEIGHT: 212.4 LBS

## 2023-10-25 DIAGNOSIS — C54.1 CARCINOSARCOMA OF ENDOMETRIUM (HCC): Primary | ICD-10-CM

## 2023-10-25 PROCEDURE — 99213 OFFICE O/P EST LOW 20 MIN: CPT | Performed by: OBSTETRICS & GYNECOLOGY

## 2024-01-19 NOTE — PROGRESS NOTES
3 month follow up for endometrial cancer ,  pt reports no abnormal spotting or bleeding, pt states no questions or concerns for today's visit    1. Have you been to the ER, urgent care clinic since your last visit?  Hospitalized since your last visit?  no    2. Have you seen or consulted any other health care providers outside of the [facility] since your last visit?  Include any pap smears or colon screening.   no

## 2024-01-24 ENCOUNTER — OFFICE VISIT (OUTPATIENT)
Age: 69
End: 2024-01-24
Payer: MEDICARE

## 2024-01-24 VITALS
DIASTOLIC BLOOD PRESSURE: 92 MMHG | SYSTOLIC BLOOD PRESSURE: 122 MMHG | WEIGHT: 212.2 LBS | HEIGHT: 62 IN | HEART RATE: 82 BPM | BODY MASS INDEX: 39.05 KG/M2

## 2024-01-24 DIAGNOSIS — C54.1 CARCINOSARCOMA OF ENDOMETRIUM (HCC): Primary | ICD-10-CM

## 2024-01-24 PROCEDURE — G8400 PT W/DXA NO RESULTS DOC: HCPCS | Performed by: OBSTETRICS & GYNECOLOGY

## 2024-01-24 PROCEDURE — G8427 DOCREV CUR MEDS BY ELIG CLIN: HCPCS | Performed by: OBSTETRICS & GYNECOLOGY

## 2024-01-24 PROCEDURE — G8417 CALC BMI ABV UP PARAM F/U: HCPCS | Performed by: OBSTETRICS & GYNECOLOGY

## 2024-01-24 PROCEDURE — 1123F ACP DISCUSS/DSCN MKR DOCD: CPT | Performed by: OBSTETRICS & GYNECOLOGY

## 2024-01-24 PROCEDURE — 99212 OFFICE O/P EST SF 10 MIN: CPT | Performed by: OBSTETRICS & GYNECOLOGY

## 2024-01-24 PROCEDURE — 3017F COLORECTAL CA SCREEN DOC REV: CPT | Performed by: OBSTETRICS & GYNECOLOGY

## 2024-01-24 PROCEDURE — 1036F TOBACCO NON-USER: CPT | Performed by: OBSTETRICS & GYNECOLOGY

## 2024-01-24 PROCEDURE — G8484 FLU IMMUNIZE NO ADMIN: HCPCS | Performed by: OBSTETRICS & GYNECOLOGY

## 2024-01-24 PROCEDURE — 1090F PRES/ABSN URINE INCON ASSESS: CPT | Performed by: OBSTETRICS & GYNECOLOGY

## 2024-01-24 NOTE — PROGRESS NOTES
[facility] GYNECOLOGIC ONCOLOGY   [address] [place] [address], [facility]   [place] [address]   P [phone]  F [phone]      Office Note   Patient ID:   Name:  Carrie Juan   MRN:  [id number]   :  1955/67 y.o.   Date:  2023         HISTORY OF PRESENT ILLNESS:  Ms. Carrie Juan is a 68 y.o.   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 On 2022 underwent Robotic-assisted total laparoscopic hysterectomy, Bilateral salpingo-oophorectomy, Bilateral pelvic sentinel lymph node mapping and biopsy. Final pathology consistent  with Stage Ia carcinosarcoma of the endometrium. 4mm out of 15 myometrial invasion. Negative washings. Negative SLNs. Negative CT C/A/P 3/23/2022.       Initiated on adjuvant carboplatin AUC 6 + paclitaxel 175mg/m2 on 2022. Completed cycle 6 on 2022. Completed VBT on 2022. CT C/A/P 2022 negative for disease.       Presents today for continued surveillance. Doing well without complaints. Denies vaginal bleeding/discharge, abdominal/pelvic pain, nausea, vomiting, constipation, diarrhea, CP, SOB, hematuria,  hematochezia, change in appetite or bowel movements, bloating, fevers, chills, or urinary symptoms.        Initial History:   Ms. Carrie Juan is a 67 y.o.   postmenopausal female who presents as a new patient from Dr. Colunga for carcinosarcoma of the endometrium.      The patient reported vaginal spotting/bleeding in February (). She underwent a pelvic ultrasound and endometrial biopsy with Dr. Colunga, which ultimately demonstrated carcinosarcoma. The patient denies pelvic or abdominal pain/bloating. Denies  CP or SOB. Denies hematuria or hematochezia. She is without any other complaints.           Pathology Review:    2022:   * * *FINAL PATHOLOGIC DIAGNOSIS* * *   1.  Right pelvic sentinel lymph node, sentinel lymph node biopsy:        One benign lymph node, no tumor seen (0/1)   2.  Left  pelvic

## 2024-06-25 ENCOUNTER — OFFICE VISIT (OUTPATIENT)
Age: 69
End: 2024-06-25
Payer: MEDICARE

## 2024-06-25 VITALS
DIASTOLIC BLOOD PRESSURE: 73 MMHG | HEART RATE: 75 BPM | HEIGHT: 62 IN | WEIGHT: 212 LBS | SYSTOLIC BLOOD PRESSURE: 131 MMHG | BODY MASS INDEX: 39.01 KG/M2

## 2024-06-25 DIAGNOSIS — C54.1 ENDOMETRIAL CANCER (HCC): Primary | ICD-10-CM

## 2024-06-25 PROCEDURE — 1090F PRES/ABSN URINE INCON ASSESS: CPT | Performed by: OBSTETRICS & GYNECOLOGY

## 2024-06-25 PROCEDURE — G8400 PT W/DXA NO RESULTS DOC: HCPCS | Performed by: OBSTETRICS & GYNECOLOGY

## 2024-06-25 PROCEDURE — 99212 OFFICE O/P EST SF 10 MIN: CPT | Performed by: OBSTETRICS & GYNECOLOGY

## 2024-06-25 PROCEDURE — 3017F COLORECTAL CA SCREEN DOC REV: CPT | Performed by: OBSTETRICS & GYNECOLOGY

## 2024-06-25 PROCEDURE — G8417 CALC BMI ABV UP PARAM F/U: HCPCS | Performed by: OBSTETRICS & GYNECOLOGY

## 2024-06-25 PROCEDURE — 1036F TOBACCO NON-USER: CPT | Performed by: OBSTETRICS & GYNECOLOGY

## 2024-06-25 PROCEDURE — 1123F ACP DISCUSS/DSCN MKR DOCD: CPT | Performed by: OBSTETRICS & GYNECOLOGY

## 2024-06-25 PROCEDURE — G8427 DOCREV CUR MEDS BY ELIG CLIN: HCPCS | Performed by: OBSTETRICS & GYNECOLOGY

## 2024-06-25 NOTE — PROGRESS NOTES
3 month follow up for endometrial cancer ,  pt reports no abnormal spotting or bleeding, pt states no questions or concerns for today's visit    1. Have you been to the ER, urgent care clinic since your last visit?  Hospitalized since your last visit?  no    2. Have you seen or consulted any other health care providers outside of the [facility] since your last visit?  Include any pap smears or colon screening.   no          
amount over port area and cover with band aid one hour before chemo      Ubiquinol 100 MG CAPS Qunol CoQ10/Ubiquinol/Aroldo      Cholecalciferol (VITAMIN D3) 10 MCG/ML LIQD Take by mouth      KRILL OIL PO Take by mouth      CO ENZYME Q-10 PO Take 1 tablet by mouth daily      Misc Natural Products (ELDERBERRY ZINC/VIT C/IMMUNE MT) by Transmucosal route daily       No current facility-administered medications on file prior to visit.       Allergies   Allergen Reactions    Penicillins Rash     Patient screened for any delayed non-IgE-mediated reaction to PCN.        Patient notes the following:    No delayed non-IgE-mediated reaction to PCN    ALLERGY AT 12 YEARS OF AGE-NO HOSPITALIZATION       Physical Exam:  Vitals:    06/25/24 1120   BP: 131/73   Site: Left Upper Arm   Position: Sitting   Pulse: 75   Weight: 96.2 kg (212 lb)   Height: 1.575 m (5' 2\")          General: Alert and oriented. No acute distress. Well-nourished   HEENT: No thyroid enlargment. Neck supple without restrictions. Sclera normal. Normal occular motion. Moist mucous membranes.   Lymphatics: No evidence of axillary, cervical, or subclavicular adenopathy.    Respiratory: clear to auscultation and percussion to the bases. No CVAT.   Cardiovascular: regular rate and rhythm. No murmurs, rubs, or gallops.   Gastrointestinal: soft, non-tender, non-distended, no masses or organomegaly. Well-healed incision.    Musculoskeletal: normal gait. No joint tenderness, deformity or swelling. No muscular tenderness.    Extremities: extremities normal, atraumatic, no cyanosis or edema.   Pelvic: exam chaperoned by nurse. Normal appearing external genitalia. On speculum exam, the vagina is atrophic. The uterus and cervix are surgically absent. No evidence of masses or nodularity on bimanual exam. Deferred rectovaginal exam.    Neuro: Grossly intact. Normal gait and movement. No acute deficit   Skin: No evidence of rashes or skin changes.            IMPRESSION/PLAN:

## 2024-09-25 ENCOUNTER — APPOINTMENT (OUTPATIENT)
Facility: HOSPITAL | Age: 69
End: 2024-09-25
Payer: MEDICARE

## 2024-09-30 ENCOUNTER — HOSPITAL ENCOUNTER (OUTPATIENT)
Facility: HOSPITAL | Age: 69
Discharge: HOME | End: 2024-10-03
Payer: MEDICARE

## 2024-09-30 VITALS
HEART RATE: 67 BPM | SYSTOLIC BLOOD PRESSURE: 153 MMHG | WEIGHT: 215 LBS | HEIGHT: 63 IN | RESPIRATION RATE: 18 BRPM | DIASTOLIC BLOOD PRESSURE: 74 MMHG | BODY MASS INDEX: 38.09 KG/M2

## 2024-09-30 PROCEDURE — 99213 OFFICE O/P EST LOW 20 MIN: CPT

## 2024-09-30 ASSESSMENT — PAIN SCALES - GENERAL: PAINLEVEL_OUTOF10: 0

## 2024-09-30 NOTE — PROGRESS NOTES
[facility] [place] [facility]  [facility]    Radiation Oncology Follow Up    Carrie Juan  [id number]  1955     Diagnosis   Diagnosis:  C55 - Malignant neoplasm of uterus, part unspecified, Diagnosed 2022 (Active) Stage IA, T1a, N0, M0     AJCC Staging has been reviewed.    Interval History   Ms. Juan arrives today for followup.    Stage IA carcinosarcoma of the endometrium.    4/7/2022 underwent Robotic-assisted total laparoscopic hysterectomy, Bilateral salpingo-oophorectomy, Bilateral pelvic sentinel lymph node mapping and biopsy. 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 4mm out of 15 myometrial invasion. Negative washings. Negative SLNs.     Negative CT C/A/P 3/23/2022.    Initiated on adjuvant carboplatin AUC 6 + paclitaxel 175mg/m2 on 5/16/2022. Completed cycle 6 on 8/30/2022.    Negative CT C/A/P 9/26/2022.    She completed 10Gy x 3 to the vaginal apex on 11/3/22 and is doing well at this time. She has seen Dr. Islas and is not having any difficulty with scarring at this time. Uses dilator intermittently.     She denies any urinary complaints, no GI complaints, no pelvic pain or other complaints. Feels well.     Review of Systems:  A complete review of systems was obtained and negative except as described above.    Interval Imaging/Labs     Above imaging/lab reports were reviewed.  Allergies and Medications     Allergies   Allergen Reactions    Penicillins Rash     Patient screened for any delayed non-IgE-mediated reaction to PCN.        Patient notes the following:    No delayed non-IgE-mediated reaction to PCN    ALLERGY AT 12 YEARS OF AGE-NO HOSPITALIZATION       Current Outpatient Medications   Medication Instructions    Cholecalciferol (VITAMIN D3) 10 MCG/ML LIQD Oral    CO ENZYME Q-10 PO 1 tablet, Oral, DAILY    KRILL OIL PO Oral    lidocaine-prilocaine (EMLA) 2.5-2.5 % cream Apply small amount over port area and cover with

## 2025-01-08 ENCOUNTER — CLINICAL DOCUMENTATION (OUTPATIENT)
Age: 70
End: 2025-01-08

## 2025-01-08 ENCOUNTER — OFFICE VISIT (OUTPATIENT)
Age: 70
End: 2025-01-08
Payer: MEDICARE

## 2025-01-08 VITALS
WEIGHT: 216 LBS | SYSTOLIC BLOOD PRESSURE: 162 MMHG | HEIGHT: 63 IN | HEART RATE: 80 BPM | DIASTOLIC BLOOD PRESSURE: 84 MMHG | BODY MASS INDEX: 38.27 KG/M2

## 2025-01-08 DIAGNOSIS — C54.1 CARCINOSARCOMA OF ENDOMETRIUM (HCC): Primary | ICD-10-CM

## 2025-01-08 PROCEDURE — 1159F MED LIST DOCD IN RCRD: CPT | Performed by: OBSTETRICS & GYNECOLOGY

## 2025-01-08 PROCEDURE — G8417 CALC BMI ABV UP PARAM F/U: HCPCS | Performed by: OBSTETRICS & GYNECOLOGY

## 2025-01-08 PROCEDURE — 1160F RVW MEDS BY RX/DR IN RCRD: CPT | Performed by: OBSTETRICS & GYNECOLOGY

## 2025-01-08 PROCEDURE — 1126F AMNT PAIN NOTED NONE PRSNT: CPT | Performed by: OBSTETRICS & GYNECOLOGY

## 2025-01-08 PROCEDURE — 1090F PRES/ABSN URINE INCON ASSESS: CPT | Performed by: OBSTETRICS & GYNECOLOGY

## 2025-01-08 PROCEDURE — 1036F TOBACCO NON-USER: CPT | Performed by: OBSTETRICS & GYNECOLOGY

## 2025-01-08 PROCEDURE — 99212 OFFICE O/P EST SF 10 MIN: CPT | Performed by: OBSTETRICS & GYNECOLOGY

## 2025-01-08 PROCEDURE — 1123F ACP DISCUSS/DSCN MKR DOCD: CPT | Performed by: OBSTETRICS & GYNECOLOGY

## 2025-01-08 PROCEDURE — G8427 DOCREV CUR MEDS BY ELIG CLIN: HCPCS | Performed by: OBSTETRICS & GYNECOLOGY

## 2025-01-08 PROCEDURE — G8400 PT W/DXA NO RESULTS DOC: HCPCS | Performed by: OBSTETRICS & GYNECOLOGY

## 2025-01-08 PROCEDURE — 3017F COLORECTAL CA SCREEN DOC REV: CPT | Performed by: OBSTETRICS & GYNECOLOGY

## 2025-01-08 PROCEDURE — M1308 PR FLU IMMUNIZE NO ADMIN: HCPCS | Performed by: OBSTETRICS & GYNECOLOGY

## 2025-01-08 ASSESSMENT — PATIENT HEALTH QUESTIONNAIRE - PHQ9
SUM OF ALL RESPONSES TO PHQ9 QUESTIONS 1 & 2: 0
SUM OF ALL RESPONSES TO PHQ QUESTIONS 1-9: 0
SUM OF ALL RESPONSES TO PHQ QUESTIONS 1-9: 0
2. FEELING DOWN, DEPRESSED OR HOPELESS: NOT AT ALL
SUM OF ALL RESPONSES TO PHQ QUESTIONS 1-9: 0
SUM OF ALL RESPONSES TO PHQ QUESTIONS 1-9: 0
1. LITTLE INTEREST OR PLEASURE IN DOING THINGS: NOT AT ALL

## 2025-01-08 NOTE — PROGRESS NOTES
6 month follow up for endometrial cancer ,  pt reports no abnormal spotting or bleeding, pt states no questions or concerns for today's visit    1. Have you been to the ER, urgent care clinic since your last visit?  Hospitalized since your last visit?  no    2. Have you seen or consulted any other health care providers outside of the [facility] since your last visit?  Include any pap smears or colon screening.   no            
         pT1b (based on intact   pieces of uterus)       Regional Lymph Nodes Modifier:                (sn)       Regional  Lymph Nodes (pN):                     pN0   * * *Comment* * *   Due to the fragmented nature of the specimen upon receipt in the   laboratory, the presence of detached fragments of tumor on the slide   adjacent to sections of  the endocervical polyp, are favored to represent   contamination, rather than true endocervical involvement.   Immunohistochemical stains for pancytokeratin performed on the sentinel   lymph nodes (specimen #1(1A,1B,1F) and specimen #2(2A,2B,  and 2C) are   negative for metastatic carcinoma cells.   Additional immunohistochemical stains performed on a section of tumor   reveal the following staining pattern:   Estrogen Receptor: Negative   P63: Focal nuclear decoration of  occasional basal epithelial cells   Desmin: Negative   Pankeratin(AE1/3): Diffuse epithelial cytoplasmic membrane decoration of   tumor cells   These findings support the diagnosis.       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       Imaging Review:   CT C/A/P 4/17/2023:   COMPARISON: 9/26/2022.   CHEST:    The lungs remain clear. There is no significant mediastinal or hilar adenopathy.   There is no pleural or pericardial effusion. Aorta is not aneurysmal. There is   mild coronary artery calcification. Central pulmonary arteries are free of   thrombus.   ABDOMEN PELVIS:   There is no inflammation, ascites, free air or significant adenopathy. Liver is   uniform in texture. There is cholelithiasis. Bile ducts and spleen are not   enlarged. Pancreas shows no mass or inflammation. Adrenal glands are normal in   size. Kidneys show no mass or hydronephrosis. Aorta shows no aneurysm.   Status post hysterectomy. No fluid collection. The bladder is not distended. The   distal

## 2025-01-08 NOTE — PROGRESS NOTES
NCCN Distress Thermometer    [facility]    Date Screening Completed: 1/8/25    Screening Declined:  [] Yes    Number that best describes how much distress you've experienced in the past week, including today?  0 [x] - No distress 1 []      2 []      3 []      4 []       5 []       6 []      7 []      8 []      9 []       10 [] - Extreme distress    PROBLEM LIST  Have you had concerns about any of the items below in the past week, including today?      Physical Concerns Practical Concerns   [] Pain [] Taking care of myself    [x] Sleep [] Taking care of others    [] Fatigue [] Safety   [] Tobacco use  [] Work   [] Substance use  [] School   [] Memory or concentration [] Housing/Utilities   [] Sexual health [] Finances   [] Changes in eating  [] Insurance   [] Loss or change of physical abilities  [] Transportation    []    Emotional Concerns [] Having enough food   [x] Worry or anxiety [] Access to medicine   [] Sadness or depression [] Treatment decisions   [] Loss of interest or enjoyment     [] Grief or loss  Spiritual or Baptist Concerns   [] Fear [] Sense of meaning or purpose   [] Loneliness  [] Changes in amrita or beliefs   [] Anger [] Death, dying, or afterlife   [] Changes in appearance [] Conflict between beliefs and cancer treatments    [] Feelings of worthlessness or being a burden [] Relationship with the sacred    [] Ritual or dietary needs    Social Concerns     [] Relationship with spouse or partner     [] Relationship with children    [] Relationship with family members     [] Relationship with friends or coworkers     [] Communication with health care team     [] Ability to have children     [] Prejudice or discrimination        Other Concerns: n/a

## 2025-05-13 NOTE — PROGRESS NOTES
4 month follow up for endometrial cancer ,  pt reports no abnormal spotting or bleeding, pt states no questions or concerns for today's visit    1. Have you been to the ER, urgent care clinic since your last visit?  Hospitalized since your last visit?  no    2. Have you seen or consulted any other health care providers outside of the [facility] since your last visit?  Include any pap smears or colon screening.   no

## 2025-05-14 ENCOUNTER — OFFICE VISIT (OUTPATIENT)
Age: 70
End: 2025-05-14
Payer: MEDICARE

## 2025-05-14 VITALS
SYSTOLIC BLOOD PRESSURE: 152 MMHG | BODY MASS INDEX: 37.81 KG/M2 | WEIGHT: 213.4 LBS | HEART RATE: 79 BPM | HEIGHT: 63 IN | DIASTOLIC BLOOD PRESSURE: 77 MMHG

## 2025-05-14 DIAGNOSIS — C54.1 CARCINOSARCOMA OF ENDOMETRIUM (HCC): Primary | ICD-10-CM

## 2025-05-14 PROCEDURE — G8400 PT W/DXA NO RESULTS DOC: HCPCS | Performed by: OBSTETRICS & GYNECOLOGY

## 2025-05-14 PROCEDURE — 1160F RVW MEDS BY RX/DR IN RCRD: CPT | Performed by: OBSTETRICS & GYNECOLOGY

## 2025-05-14 PROCEDURE — 1090F PRES/ABSN URINE INCON ASSESS: CPT | Performed by: OBSTETRICS & GYNECOLOGY

## 2025-05-14 PROCEDURE — 99212 OFFICE O/P EST SF 10 MIN: CPT | Performed by: OBSTETRICS & GYNECOLOGY

## 2025-05-14 PROCEDURE — 1126F AMNT PAIN NOTED NONE PRSNT: CPT | Performed by: OBSTETRICS & GYNECOLOGY

## 2025-05-14 PROCEDURE — 1036F TOBACCO NON-USER: CPT | Performed by: OBSTETRICS & GYNECOLOGY

## 2025-05-14 PROCEDURE — 1123F ACP DISCUSS/DSCN MKR DOCD: CPT | Performed by: OBSTETRICS & GYNECOLOGY

## 2025-05-14 PROCEDURE — 3017F COLORECTAL CA SCREEN DOC REV: CPT | Performed by: OBSTETRICS & GYNECOLOGY

## 2025-05-14 PROCEDURE — G8427 DOCREV CUR MEDS BY ELIG CLIN: HCPCS | Performed by: OBSTETRICS & GYNECOLOGY

## 2025-05-14 PROCEDURE — G8417 CALC BMI ABV UP PARAM F/U: HCPCS | Performed by: OBSTETRICS & GYNECOLOGY

## 2025-05-14 PROCEDURE — 1159F MED LIST DOCD IN RCRD: CPT | Performed by: OBSTETRICS & GYNECOLOGY

## 2025-05-14 ASSESSMENT — PATIENT HEALTH QUESTIONNAIRE - PHQ9
SUM OF ALL RESPONSES TO PHQ QUESTIONS 1-9: 0
SUM OF ALL RESPONSES TO PHQ QUESTIONS 1-9: 0
2. FEELING DOWN, DEPRESSED OR HOPELESS: NOT AT ALL
1. LITTLE INTEREST OR PLEASURE IN DOING THINGS: NOT AT ALL
SUM OF ALL RESPONSES TO PHQ QUESTIONS 1-9: 0
SUM OF ALL RESPONSES TO PHQ QUESTIONS 1-9: 0

## 2025-05-14 NOTE — PROGRESS NOTES
[facility]YNECOLOGIC [facility]COL[facility]   [address] [place] [address], [facility]   [place] [address]   P [phone]  F [phone]      Office Note   Patient ID:   Name:  Carrie Juan   MRN:  [id number]   :  1955/67 y.o.   Date:  2023         HISTORY OF PRESENT ILLNESS:  Ms. Carrie Juan is a 69 y.o.   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 On 2022 underwent Robotic-assisted total laparoscopic hysterectomy, Bilateral salpingo-oophorectomy, Bilateral pelvic sentinel lymph node mapping and biopsy. Final pathology consistent  with Stage Ia carcinosarcoma of the endometrium. 4mm out of 15 myometrial invasion. Negative washings. Negative SLNs. Negative CT C/A/P 3/23/2022.       Initiated on adjuvant carboplatin AUC 6 + paclitaxel 175mg/m2 on 2022. Completed cycle 6 on 2022. Completed VBT on 2022. CT C/A/P 2022 negative for disease.       Presents today for continued surveillance. Doing well without complaints. Denies vaginal bleeding/discharge, abdominal/pelvic pain, nausea, vomiting, constipation, diarrhea, CP, SOB, hematuria,  hematochezia, change in appetite or bowel movements, bloating, fevers, chills, or urinary symptoms.        Initial History:   Ms. Carrie Juan is a 67 y.o.   postmenopausal female who presents as a new patient from Dr. Colunga for carcinosarcoma of the endometrium.      The patient reported vaginal spotting/bleeding in February (). She underwent a pelvic ultrasound and endometrial biopsy with Dr. Colunga, which ultimately demonstrated carcinosarcoma. The patient denies pelvic or abdominal pain/bloating. Denies  CP or SOB. Denies hematuria or hematochezia. She is without any other complaints.           Pathology Review:    2022:   * * *FINAL PATHOLOGIC DIAGNOSIS* * *   1.  Right pelvic sentinel lymph node, sentinel lymph node biopsy:        One benign lymph node, no tumor seen (0/1)   2.  Left  pelvic

## (undated) DEVICE — GYN LAPAROSCOPY - SMH: Brand: MEDLINE INDUSTRIES, INC.

## (undated) DEVICE — ARM DRAPE

## (undated) DEVICE — PACK,BASIC,SIRUS,V: Brand: MEDLINE

## (undated) DEVICE — AIRSEAL 8 MM ACCESS PORT AND LOW PROFILE OBTURATOR WITH BLADELESS OPTICAL TIP, 120 MM LENGTH: Brand: AIRSEAL

## (undated) DEVICE — BLADE ASSEMB CLP HAIR FINE --

## (undated) DEVICE — INSUFFLATION NEEDLE: Brand: SURGINEEDLE

## (undated) DEVICE — HYPODERMIC SAFETY NEEDLE: Brand: MAGELLAN

## (undated) DEVICE — TRI-LUMEN FILTERED TUBE SET WITH ACTIVATED CHARCOAL FILTER: Brand: AIRSEAL

## (undated) DEVICE — SOLUTION IRRIG 1000ML 0.9% SOD CHL USP POUR PLAS BTL

## (undated) DEVICE — SYSTEM EVAC SMOKE LAPARSCOPIC

## (undated) DEVICE — VCARE MEDIUM, UTERINE MANIPULATOR, VAGINAL-CERVICAL-AHLUWALIA'S-RETRACTOR-ELEVATOR: Brand: VCARE

## (undated) DEVICE — GLOVE SURG 7.5 11.7IN BEAD CUF LIGHT BRN SENSICARE LTX FREE

## (undated) DEVICE — ELECTRO LUBE IS A SINGLE PATIENT USE DEVICE THAT IS INTENDED TO BE USED ON ELECTROSURGICAL ELECTRODES TO REDUCE STICKING.: Brand: KEY SURGICAL ELECTRO LUBE

## (undated) DEVICE — SUTURE MCRYL SZ 4-0 L27IN ABSRB UD L19MM PS-2 1/2 CIR PRIM Y426H

## (undated) DEVICE — INTENDED FOR TISSUE SEPARATION, AND OTHER PROCEDURES THAT REQUIRE A SHARP SURGICAL BLADE TO PUNCTURE OR CUT.: Brand: BARD-PARKER ® CARBON RIB-BACK BLADES

## (undated) DEVICE — TAPE,CLOTH/SILK,CURAD,3"X10YD,LF,40/CS: Brand: CURAD

## (undated) DEVICE — GLOVE ORANGE PI 7   MSG9070

## (undated) DEVICE — BLADELESS OBTURATOR: Brand: WECK VISTA

## (undated) DEVICE — NEEDLE SPNL 22GA L3.5IN BLK HUB S STL REG WALL FIT STYL W/

## (undated) DEVICE — COVER MPLR TIP CRV SCIS ACC DA VINCI

## (undated) DEVICE — GLOVE SURG SZ 75 L12IN FNGR THK79MIL GRN LTX FREE

## (undated) DEVICE — AGENT HEMSTAT W4XL4IN OXIDIZED REGENERATED CELOS ABSRB SFT

## (undated) DEVICE — TISSUE RETRIEVAL SYSTEM: Brand: INZII RETRIEVAL SYSTEM

## (undated) DEVICE — SEAL UNIV 5-8MM DISP BX/10 -- DA VINCI XI - SNGL USE

## (undated) DEVICE — CONTAINER,SPECIMEN,4OZ,OR STRL: Brand: MEDLINE